# Patient Record
Sex: FEMALE | Race: WHITE | NOT HISPANIC OR LATINO | Employment: OTHER | URBAN - METROPOLITAN AREA
[De-identification: names, ages, dates, MRNs, and addresses within clinical notes are randomized per-mention and may not be internally consistent; named-entity substitution may affect disease eponyms.]

---

## 2017-01-09 ENCOUNTER — DOCUMENTATION ONLY (OUTPATIENT)
Dept: FAMILY MEDICINE | Facility: CLINIC | Age: 82
End: 2017-01-09

## 2017-01-09 NOTE — PROGRESS NOTES
Pre-Visit Chart Review  For Appointment Scheduled on 1-10-17    There are no preventive care reminders to display for this patient.

## 2017-01-10 ENCOUNTER — OFFICE VISIT (OUTPATIENT)
Dept: FAMILY MEDICINE | Facility: CLINIC | Age: 82
End: 2017-01-10
Payer: MEDICARE

## 2017-01-10 VITALS
BODY MASS INDEX: 19.67 KG/M2 | TEMPERATURE: 98 F | HEIGHT: 63 IN | DIASTOLIC BLOOD PRESSURE: 68 MMHG | WEIGHT: 111 LBS | HEART RATE: 64 BPM | SYSTOLIC BLOOD PRESSURE: 140 MMHG

## 2017-01-10 DIAGNOSIS — N18.30 CHRONIC KIDNEY DISEASE, STAGE III (MODERATE): ICD-10-CM

## 2017-01-10 DIAGNOSIS — M06.9 RHEUMATOID ARTHRITIS, INVOLVING UNSPECIFIED SITE, UNSPECIFIED RHEUMATOID FACTOR PRESENCE: Primary | ICD-10-CM

## 2017-01-10 DIAGNOSIS — R53.81 DEBILITY: ICD-10-CM

## 2017-01-10 DIAGNOSIS — I10 ESSENTIAL HYPERTENSION: ICD-10-CM

## 2017-01-10 DIAGNOSIS — R01.1 CARDIAC MURMUR: ICD-10-CM

## 2017-01-10 PROCEDURE — 99214 OFFICE O/P EST MOD 30 MIN: CPT | Mod: S$PBB,,, | Performed by: FAMILY MEDICINE

## 2017-01-10 PROCEDURE — 99999 PR PBB SHADOW E&M-EST. PATIENT-LVL IV: CPT | Mod: PBBFAC,,, | Performed by: FAMILY MEDICINE

## 2017-01-10 PROCEDURE — 99214 OFFICE O/P EST MOD 30 MIN: CPT | Mod: PBBFAC,PO | Performed by: FAMILY MEDICINE

## 2017-01-10 PROCEDURE — 99499 UNLISTED E&M SERVICE: CPT | Mod: S$PBB,,, | Performed by: FAMILY MEDICINE

## 2017-01-10 RX ORDER — LISINOPRIL 20 MG/1
20 TABLET ORAL DAILY
Qty: 90 TABLET | Refills: 3 | Status: SHIPPED | OUTPATIENT
Start: 2017-01-10 | End: 2017-11-27 | Stop reason: SDUPTHER

## 2017-01-10 NOTE — MR AVS SNAPSHOT
[facility]  [address] [address]  Phone: [phone]  Fax: [phone]                  Melody Dupont   1/10/2017 3:20 PM   Office Visit    Description:  Female : 1927   Provider:  Lady Loving MD   Department:  [place] [facility]           Reason for Visit     Establish Care           Diagnoses this Visit        Comments    Rheumatoid arthritis, involving unspecified site, unspecified rheumatoid factor presence    -  Primary     Essential hypertension         Chronic kidney disease, stage III (moderate)         Debility         Cardiac murmur                To Do List           Future Appointments        Provider Department Dept Phone    2017 1:00 PM HEIDE DWYER [facility] - Cardiology [phone]    3/6/2017 1:30 PM MD [facility] - Rheumatology [phone]    2017 4:00 PM Lady Loving MD [facility] [phone]      Goals (5 Years of Data)     None      Follow-Up and Disposition     Return in about 3 months (around 4/10/2017).       These Medications        Disp Refills Start End    lisinopril (PRINIVIL,ZESTRIL) 20 MG tablet 90 tablet 3 1/10/2017     Take 1 tablet (20 mg total) by mouth once daily. - Oral    Pharmacy: [facility]/pharmacy #[id number] - [place], [place] - [address] Ph #: [phone]         [facility]     [facility] On [facility] Nurse Care Line -  Assistance  Registered nurses in the [facility] On Call Center provide clinical advisement, health education, appointment booking, and other advisory services.  Call for this free service at [phone].             Medications           Message regarding Medications     Verify the changes and/or additions to your medication regime listed below are the same as discussed with your clinician today.  If any of these changes or additions are incorrect, please notify your healthcare provider.        CHANGE how you are taking these medications     Start  "Taking Instead of    lisinopril (PRINIVIL,ZESTRIL) 20 MG tablet lisinopril 10 MG tablet    Dosage:  Take 1 tablet (20 mg total) by mouth once daily. Dosage:  TAKE 1 TABLET BY MOUTH ONCE DAILY    Reason for Change:  Reorder       STOP taking these medications     predniSONE (DELTASONE) 10 MG tablet TAKE BY MOUTH ONCE DAILY.    nebivolol (BYSTOLIC) 5 MG Tab Take 1 tablet (5 mg total) by mouth once daily.           Verify that the below list of medications is an accurate representation of the medications you are currently taking.  If none reported, the list may be blank. If incorrect, please contact your healthcare provider. Carry this list with you in case of emergency.           Current Medications     aspirin (ECOTRIN) 81 MG EC tablet Take 162 mg by mouth once daily.    cyanocobalamin (VITAMIN B-12) 1000 MCG tablet Take 100 mcg by mouth once daily.    lisinopril (PRINIVIL,ZESTRIL) 20 MG tablet Take 1 tablet (20 mg total) by mouth once daily.    pravastatin (PRAVACHOL) 10 MG tablet Take 1 tablet (10 mg total) by mouth once daily.           Clinical Reference Information           Vital Signs - Last Recorded  Most recent update: 1/10/2017  3:19 PM by Charlotte Mcmanus MA    BP Pulse Temp Ht Wt BMI    (!) 140/68 (BP Location: Right arm, Patient Position: Sitting, BP Method: Manual) 64 97.9 °F (36.6 °C) (Oral) 5' 3" (1.6 m) 50.3 kg (111 lb) 19.66 kg/m2      Blood Pressure          Most Recent Value    BP  (!)  140/68      Allergies as of 1/10/2017     Antihistamines - Alkylamine    Plavix [Clopidogrel]    Tramadol    Triamcinolone    Penicillins      Immunizations Administered on Date of Encounter - 1/10/2017     None      Orders Placed During Today's Visit      Normal Orders This Visit    Ambulatory referral to Home Health     Ambulatory referral to Rheumatology     Future Labs/Procedures Expected by Expires    2D Echo w/ Color Flow Doppler  As directed 1/10/2018      "

## 2017-01-16 NOTE — PROGRESS NOTES
Subjective:       Patient ID: Melody Dupont is a 89 y.o. female.    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  4-5 times in last 3 years.  Taking 2 81mg asa a day    Dr. Nam bhagat for 30 years    Rheum Dr. Cisneros treating for RA.  Uses walker or a wheelchair for mobility and fall prevention.  Recently stopped prednisone    Review of Systems   Constitutional: Negative for fatigue and unexpected weight change.   Respiratory: Negative for chest tightness and shortness of breath.    Cardiovascular: Negative for chest pain, palpitations and leg swelling.   Gastrointestinal: Negative for abdominal pain.   Endocrine: Negative for polydipsia, polyphagia and polyuria.   Musculoskeletal: Positive for arthralgias, back pain, myalgias, neck pain and neck stiffness.   Neurological: Negative for dizziness, syncope, light-headedness and headaches.       Objective:      Physical Exam   Constitutional: She is oriented to person, place, and time. She appears well-developed and well-nourished.   Cardiovascular: Normal rate and regular rhythm.    Murmur heard.  Pulmonary/Chest: Effort normal and breath sounds normal.   Musculoskeletal: She exhibits no edema.   Neurological: She is alert and oriented to person, place, and time.   Skin: Skin is warm and dry.   Psychiatric: She has a normal mood and affect.   Nursing note and  vitals reviewed.      Assessment:       1. Rheumatoid arthritis, involving unspecified site, unspecified rheumatoid factor presence    2. Essential hypertension    3. Chronic kidney disease, stage III (moderate)    4. Debility    5. Cardiac murmur        Plan:       1. Rheumatoid arthritis, involving unspecified site, unspecified rheumatoid factor presence  Cont care under rheum and current meds  - Ambulatory referral to Rheumatology  - Ambulatory referral to Home Health    2. Essential hypertension  Controlled on current medications.  Continue current medications.  Goal < 150/90    3. Chronic kidney disease, stage III (moderate)  Stable and chronic.  Will continue to monitor q3-6 months and control chronic conditions as optimally as possible to preserve function.      4. Debility  HH for SN, PT, OT .  Fall precautions.  Cont walker and/or wheelchair use  - Ambulatory referral to Home Health    5. Cardiac murmur  Refer for eval and treatment  - 2D Echo w/ Color Flow Doppler; Future       [facility] goal documentation:  Patient readiness: acceptance and barriers:readiness  During the course of the visit the patient was educated and counseled about the following: Hypertension:   Dietary sodium restriction.  Regular aerobic exercise.  Goals: Hypertension: Reduce Blood Pressure  Goal/Outcomes met:Hypertension  The following self management tools provided:none  Patient Instructions (the written plan) was given to the patient/family: Yes  Time spent with patient: 40 minutes    Patient with be reevaluated in 3 months or sooner prn    Greater than 50% of this visit was spent counseling as described in above documentation:Yes

## 2017-01-17 ENCOUNTER — CLINICAL SUPPORT (OUTPATIENT)
Dept: CARDIOLOGY | Facility: CLINIC | Age: 82
End: 2017-01-17
Payer: MEDICARE

## 2017-01-17 DIAGNOSIS — R01.1 CARDIAC MURMUR: ICD-10-CM

## 2017-01-17 LAB
AORTIC VALVE REGURGITATION: ABNORMAL
AORTIC VALVE STENOSIS: ABNORMAL
DIASTOLIC DYSFUNCTION: YES
ESTIMATED PA SYSTOLIC PRESSURE: 26.04
RETIRED EF AND QEF - SEE NOTES: 60 (ref 55–65)
TRICUSPID VALVE REGURGITATION: ABNORMAL

## 2017-01-17 PROCEDURE — 93306 TTE W/DOPPLER COMPLETE: CPT | Mod: PBBFAC,PO | Performed by: INTERNAL MEDICINE

## 2017-01-23 ENCOUNTER — INITIAL CONSULT (OUTPATIENT)
Dept: RHEUMATOLOGY | Facility: CLINIC | Age: 82
End: 2017-01-23
Payer: MEDICARE

## 2017-01-23 ENCOUNTER — HOSPITAL ENCOUNTER (OUTPATIENT)
Dept: RADIOLOGY | Facility: HOSPITAL | Age: 82
Discharge: HOME | End: 2017-01-23
Attending: INTERNAL MEDICINE
Payer: MEDICARE

## 2017-01-23 VITALS
HEART RATE: 86 BPM | DIASTOLIC BLOOD PRESSURE: 65 MMHG | WEIGHT: 110 LBS | SYSTOLIC BLOOD PRESSURE: 122 MMHG | BODY MASS INDEX: 17.68 KG/M2 | HEIGHT: 66 IN

## 2017-01-23 DIAGNOSIS — M25.461 SWELLING OF RIGHT KNEE JOINT: ICD-10-CM

## 2017-01-23 DIAGNOSIS — M81.0 OSTEOPOROSIS: ICD-10-CM

## 2017-01-23 DIAGNOSIS — M05.79 RHEUMATOID ARTHRITIS INVOLVING MULTIPLE SITES WITH POSITIVE RHEUMATOID FACTOR: ICD-10-CM

## 2017-01-23 DIAGNOSIS — M05.742 RHEUMATOID ARTHRITIS INVOLVING BOTH HANDS WITH POSITIVE RHEUMATOID FACTOR: ICD-10-CM

## 2017-01-23 DIAGNOSIS — R53.83 FATIGUE, UNSPECIFIED TYPE: ICD-10-CM

## 2017-01-23 DIAGNOSIS — M94.9 DISORDER OF BONE AND CARTILAGE: ICD-10-CM

## 2017-01-23 DIAGNOSIS — M05.742 RHEUMATOID ARTHRITIS INVOLVING BOTH HANDS WITH POSITIVE RHEUMATOID FACTOR: Primary | ICD-10-CM

## 2017-01-23 DIAGNOSIS — M05.741 RHEUMATOID ARTHRITIS INVOLVING BOTH HANDS WITH POSITIVE RHEUMATOID FACTOR: ICD-10-CM

## 2017-01-23 DIAGNOSIS — M05.741 RHEUMATOID ARTHRITIS INVOLVING BOTH HANDS WITH POSITIVE RHEUMATOID FACTOR: Primary | ICD-10-CM

## 2017-01-23 DIAGNOSIS — Z11.4 SCREENING FOR HIV (HUMAN IMMUNODEFICIENCY VIRUS): ICD-10-CM

## 2017-01-23 DIAGNOSIS — E55.9 HYPOVITAMINOSIS D: ICD-10-CM

## 2017-01-23 DIAGNOSIS — M89.9 DISORDER OF BONE AND CARTILAGE: ICD-10-CM

## 2017-01-23 DIAGNOSIS — M79.10 MYALGIA: ICD-10-CM

## 2017-01-23 PROCEDURE — 71020 XR CHEST PA AND LATERAL: CPT | Mod: 26,,, | Performed by: RADIOLOGY

## 2017-01-23 PROCEDURE — 99205 OFFICE O/P NEW HI 60 MIN: CPT | Mod: 25,S$PBB,, | Performed by: INTERNAL MEDICINE

## 2017-01-23 PROCEDURE — 77077 JOINT SURVEY SINGLE VIEW: CPT | Mod: 26,,, | Performed by: RADIOLOGY

## 2017-01-23 PROCEDURE — 71020 XR CHEST PA AND LATERAL: CPT | Mod: TC

## 2017-01-23 PROCEDURE — 99999 PR PBB SHADOW E&M-EST. PATIENT-LVL III: CPT | Mod: PBBFAC,,, | Performed by: INTERNAL MEDICINE

## 2017-01-23 PROCEDURE — 99499 UNLISTED E&M SERVICE: CPT | Mod: S$PBB,,, | Performed by: INTERNAL MEDICINE

## 2017-01-23 PROCEDURE — 99213 OFFICE O/P EST LOW 20 MIN: CPT | Mod: PBBFAC,PO | Performed by: INTERNAL MEDICINE

## 2017-01-23 PROCEDURE — 20610 DRAIN/INJ JOINT/BURSA W/O US: CPT | Mod: PBBFAC,PO | Performed by: INTERNAL MEDICINE

## 2017-01-23 PROCEDURE — 77077 JOINT SURVEY SINGLE VIEW: CPT | Mod: TC

## 2017-01-23 RX ORDER — METHYLPREDNISOLONE ACETATE 40 MG/ML
20 INJECTION, SUSPENSION INTRA-ARTICULAR; INTRALESIONAL; INTRAMUSCULAR; SOFT TISSUE
Status: COMPLETED | OUTPATIENT
Start: 2017-01-23 | End: 2017-01-23

## 2017-01-23 RX ADMIN — METHYLPREDNISOLONE ACETATE 20 MG: 40 INJECTION, SUSPENSION INTRA-ARTICULAR; INTRALESIONAL; INTRAMUSCULAR; SOFT TISSUE at 03:01

## 2017-01-23 ASSESSMENT — ROUTINE ASSESSMENT OF PATIENT INDEX DATA (RAPID3)
TOTAL RAPID3 SCORE: 6.89
PATIENT GLOBAL ASSESSMENT SCORE: 6
MDHAQ FUNCTION SCORE: 2
PAIN SCORE: 8
PSYCHOLOGICAL DISTRESS SCORE: 6.6

## 2017-01-23 NOTE — PROGRESS NOTES
Administered 0.25cc DepoMedrol 80mg/cc  to left upper outer Gluteal. Pt tolerated well. No acute reaction noted to site. Pt instructed on S/S to report. Pt verbalized understanding.     Lot: [id number]  Exp: 10/2017

## 2017-01-23 NOTE — PROCEDURES
Large Joint Aspiration/Injection  Date/Time: 1/23/2017 2:12 PM  Performed by: CHANTALE KEARNS  Authorized by: CHANTALE KEARNS     Consent Done?:  Yes (Verbal)  Indications:  Pain and joint swelling  Procedure site marked: Yes    Timeout: Prior to procedure the correct patient, procedure, and site was verified      Location:  Knee  Site:  R knee  Prep: Patient was prepped and draped in usual sterile fashion    Ultrasonic Guidance for needle placement: No  Needle size:  22 G  Approach:  Medial  Aspirate amount (ml):  0  Patient tolerance:  Patient tolerated the procedure well with no immediate complications    Additional Comments: Attempted arthrocentesis of right knee-0ml of SF was obtained

## 2017-01-23 NOTE — MR AVS SNAPSHOT
[place] [facility]  [address]  [place] [address]  Phone: [phone]  Fax: [phone]                  Melody Dupont   2017 10:00 AM   Initial consult    Description:  Female : 1927   Provider:  Shelby Peraza MD   Department:  [place] - Rheumatology           Reason for Visit     Consult           Diagnoses this Visit        Comments    Rheumatoid arthritis involving both hands with positive rheumatoid factor    -  Primary     Screening for HIV (human immunodeficiency virus)         Disorder of bone and cartilage                To Do List           Future Appointments        Provider Department Dept Phone    2017 2:20 PM [facility] XR2 [facility] [phone]    2017 2:40 PM [facility] [phone]    2017 12:50 PM [facility] [phone]    2017 1:00 PM [facility] DEXA1 [facility] [phone]    2017 2:00 PM Shelby Peraza MD [place] - Rheumatology [phone]      Goals (5 Years of Data)     None      [facility] On Call     [facility] On [facility] Nurse Care Line - 247 Assistance  Registered nurses in the [facility] On Call Center provide clinical advisement, health education, appointment booking, and other advisory services.  Call for this free service at [phone].             Medications           Message regarding Medications     Verify the changes and/or additions to your medication regime listed below are the same as discussed with your clinician today.  If any of these changes or additions are incorrect, please notify your healthcare provider.        These medications were administered today        Dose Freq    methylPREDNISolone acetate injection 20 mg 20 mg Clinic/HOD 1 time    Sig: Inject 0.5 mLs (20 mg total) into the muscle one time.    Class: Normal    Route: Intramuscular           Verify that the below list of medications is an  "accurate representation of the medications you are currently taking.  If none reported, the list may be blank. If incorrect, please contact your healthcare provider. Carry this list with you in case of emergency.           Current Medications     aspirin (ECOTRIN) 81 MG EC tablet Take 162 mg by mouth once daily.    cyanocobalamin (VITAMIN B-12) 1000 MCG tablet Take 100 mcg by mouth once daily.    lisinopril (PRINIVIL,ZESTRIL) 20 MG tablet Take 1 tablet (20 mg total) by mouth once daily.    pravastatin (PRAVACHOL) 10 MG tablet Take 1 tablet (10 mg total) by mouth once daily.           Clinical Reference Information           Vital Signs - Last Recorded  Most recent update: 1/23/2017  1:05 PM by Michelle Rubio LPN    BP Pulse Ht Wt BMI    122/65 86 5' 5.5" (1.664 m) 49.9 kg (110 lb 0.2 oz) 18.03 kg/m2      Blood Pressure          Most Recent Value    BP  122/65      Allergies as of 1/23/2017     Antihistamines - Alkylamine    Plavix [Clopidogrel]    Tramadol    Triamcinolone    Penicillins      Immunizations Administered on Date of Encounter - 1/23/2017     None      Orders Placed During Today's Visit     Future Labs/Procedures Expected by Expires    MG  1/23/2017 3/24/2018    C-reactive protein  1/23/2017 3/24/2018    CK  1/23/2017 3/24/2018    Cyclic citrul peptide antibody, IgG  1/23/2017 3/24/2018    DXA Bone Density Spine And Hip_Axial Skeleton  1/23/2017 1/24/2018    HBcAB  1/23/2017 3/24/2018    Hepatitis B surface antibody  1/23/2017 3/24/2018    Hepatitis B surface antigen  1/23/2017 3/24/2018    Hepatitis C antibody  1/23/2017 3/24/2018    HIV-1 and HIV-2 antibodies  1/23/2017 3/24/2018    Quantiferon Gold TB  1/23/2017 3/24/2018    Rheumatoid factor  1/23/2017 3/24/2018    RPR  1/23/2017 3/24/2018    Sedimentation rate, manual  1/23/2017 3/24/2018    Sjogrens syndrome-A extractable nuclear antibody  1/23/2017 3/24/2018    Vitamin D  1/23/2017 3/24/2018    X-Ray Chest PA And Lateral  1/23/2017 1/24/2018    " XR Arthritis Survey  1/23/2017 1/23/2018

## 2017-01-23 NOTE — PROGRESS NOTES
"Subjective:       Patient ID: Melody Dupont is a 89 y.o. female.    Chief Complaint:Rheumatoid arthritis   HPI 88 yo female with HTN and CKD3 is seen in consultation for RA. RA was diagnosed 30 years ago by rheumatologist in [place]. Also was following with Dr Roberson for 5 years. Last saw her summer 2016. MTX was started 10 years ago- took 2 years ago. Discontinued because of fear of side effects. Has taken prednisone intermittently.   Now has severely deformed joints.   Today, pain is located in rt knee 8/10, worse in the evening, worse with activity, better with rest. Accompanied by swelling.  Received steroid injection by Dr. Lee 3 weeks ago  She denies fever, weight loss, dry eyes or mouth, photosensitivity, rash, ulcer, raynaud's phenomenon, alopecia, dysphagia, diarrhea or blood in the stools    Review of Systems   Constitutional: Positive for fatigue. Negative for fever.   HENT: Negative for ear discharge and ear pain.    Eyes: Negative for pain and redness.   Respiratory: Negative for cough and shortness of breath.    Cardiovascular: Negative for chest pain and palpitations.   Gastrointestinal: Negative for abdominal distention and abdominal pain.   Genitourinary: Negative for genital sores and hematuria.   Musculoskeletal: Positive for myalgias.   Neurological: Negative for tremors and seizures.   Psychiatric/Behavioral: Negative for agitation and hallucinations.         Objective:     Visit Vitals    /65    Pulse 86    Ht 5' 5.5" (1.664 m)    Wt 49.9 kg (110 lb 0.2 oz)    BMI 18.03 kg/m2        Physical Exam   Constitutional: She is oriented to person, place, and time and well-developed, well-nourished, and in no distress.   HENT:   Head: Normocephalic and atraumatic.   Eyes: Conjunctivae and EOM are normal. Pupils are equal, round, and reactive to light.   Neck: Neck supple. No thyromegaly present.   Cardiovascular: Normal rate and regular rhythm.    Pulmonary/Chest: Effort normal " and breath sounds normal.   Abdominal: Soft. Bowel sounds are normal. There is no hepatomegaly.       Right Side Rheumatological Exam     Examination finds the shoulder, elbow, wrist, 1st PIP, 1st MCP, 2nd PIP, 2nd MCP, 3rd PIP, 3rd MCP, 4th PIP, 4th MCP, 5th PIP and 5th MCP normal.    The patient is tender to palpation of the knee    She has swelling of the knee    Left Side Rheumatological Exam     Examination finds the shoulder, elbow, wrist, knee, 1st PIP, 1st MCP, 2nd PIP, 2nd MCP, 3rd PIP, 3rd MCP, 4th PIP, 4th MCP, 5th PIP and 5th MCP normal.      Neurological: She is alert and oriented to person, place, and time. She has normal sensation.   Skin: Skin is warm and dry.     Psychiatric: Memory and affect normal.   Musculoskeletal: She exhibits no edema.   Patient unable to climb on the examination table  + Scoliosis  Restriction of left shoulder on abduction and external rotation  Right shoulder with intact range of motion  Right elbow with crepitus and enlargement  Left elbow intact  Bilateral wrists with crepitus and limited extension and flexion  Bilateral hands with severe ulnar deviation and MCP subluxation  Bilateral hips with external rotation 15° internal rotation 0°  Right knee with swelling and tenderness  Left knee enlarged with crepitus  Bilateral ankles without any swelling  Bilateral feet with severe mtp subluxation, hallux valgus and hammer toes           Lab Results   Component Value Date    WBC 9.84 10/03/2016    HGB 13.2 10/03/2016    HCT 42.1 10/03/2016    MCV 93 10/03/2016     10/03/2016     CMP  Sodium   Date Value Ref Range Status   10/03/2016 142 136 - 145 mmol/L Final     Potassium   Date Value Ref Range Status   10/03/2016 4.6 3.5 - 5.1 mmol/L Final     Chloride   Date Value Ref Range Status   10/03/2016 108 95 - 110 mmol/L Final     CO2   Date Value Ref Range Status   10/03/2016 22 (L) 23 - 29 mmol/L Final     Glucose   Date Value Ref Range Status   10/03/2016 121 (H) 70 - 110  mg/dL Final     BUN, Bld   Date Value Ref Range Status   10/03/2016 20 8 - 23 mg/dL Final     Creatinine   Date Value Ref Range Status   10/03/2016 1.1 0.5 - 1.4 mg/dL Final     Calcium   Date Value Ref Range Status   10/03/2016 9.5 8.7 - 10.5 mg/dL Final     Total Protein   Date Value Ref Range Status   10/03/2016 6.9 6.0 - 8.4 g/dL Final     Albumin   Date Value Ref Range Status   10/03/2016 3.6 3.5 - 5.2 g/dL Final     Total Bilirubin   Date Value Ref Range Status   10/03/2016 0.3 0.1 - 1.0 mg/dL Final     Comment:     For infants and newborns, interpretation of results should be based  on gestational age, weight and in agreement with clinical  observations.  Premature Infant recommended reference ranges:  Up to 24 hours.............<8.0 mg/dL  Up to 48 hours............<12.0 mg/dL  3-5 days..................<15.0 mg/dL  6-29 days.................<15.0 mg/dL       Alkaline Phosphatase   Date Value Ref Range Status   10/03/2016 75 55 - 135 U/L Final     AST   Date Value Ref Range Status   10/03/2016 18 10 - 40 U/L Final     ALT   Date Value Ref Range Status   10/03/2016 10 10 - 44 U/L Final     Anion Gap   Date Value Ref Range Status   10/03/2016 12 8 - 16 mmol/L Final     eGFR if    Date Value Ref Range Status   10/03/2016 51.4 (A) >60 mL/min/1.73 m^2 Final     eGFR if non    Date Value Ref Range Status   10/03/2016 44.6 (A) >60 mL/min/1.73 m^2 Final     Comment:     Calculation used to obtain the estimated glomerular filtration  rate (eGFR) is the CKD-EPI equation. Since race is unknown   in our information system, the eGFR values for   -American and Non--American patients are given   for each creatinine result.       Lab Results   Component Value Date    SEDRATE 11 07/24/2008     No results found for: CRP    Assessment:   Seropositive erosive rheumatoid arthritis with severe deformities-not been on medications for the last 8 years -obtain records from Dr. Roberson    Right knee swelling-attempted arthrocentesis-no synovitis fluid was obtained.  No immediate complications  Osteoporosis with fractures  Myalgias-rule out myositis  Fatigue-rule out vitamin D deficiency /disorders of bone and cartilage  CK D3  Plan:   Attempted arthrocentesis-no synovitis fluid was obtained.  No immediate complicationsDepo-Medrol 20 mg IM ×1 (triamcinolone is listed as ALLERGY but she gives history of taking steroid injections in the past without any side effects)  Check MG, SSA, RF, CCP, ESR, CRP, Arthritis survey  Check CPK, aldolase, 25 hydroxy Vit D   Check DEXA scan  Plan is to start on methotrexate for rheumatoid arthritis  Will renally dose the medications  Discussed side effects of methotrexate in length  Check pre-DMARD's including HIV screening and chest x-ray  Plan is to start on Prolia for osteoporosis  Discussed side effects of Prolia  Counseled for fall prevention  Return to clinic in 1 month    Low vitamin D. Prescribed vitamin D 50,000 a week for 12 weeks.

## 2017-01-23 NOTE — LETTER
January 24, 2017      Lady Loving MD  [address]  [place] [address]           [place] [facility]  [address]  [place] [address]  Phone: [phone]  Fax: [phone]          Patient: Melody Dupont   MR Number: [id number]   YOB: 1927   Date of Visit: 1/23/2017       Dear Dr. Lady Loving:    Thank you for referring Melody Dupont to me for evaluation. Attached you will find relevant portions of my assessment and plan of care.    If you have questions, please do not hesitate to call me. I look forward to following Melody Dupont along with you.    Sincerely,    Shelby Peraza MD    Enclosure  CC:  No Recipients    If you would like to receive this communication electronically, please contact [email or web address] or [phone] to request more information on [facility] access.    For providers and/or their staff who would like to refer a patient to [facility], please contact us through our one-stop-shop provider referral line, [facility], at [phone].    If you feel you have received this communication in error or would no longer like to receive these types of communications, please e-mail [email or web address]

## 2017-01-24 RX ORDER — ERGOCALCIFEROL 1.25 MG/1
50000 CAPSULE ORAL
Qty: 12 CAPSULE | Refills: 0 | Status: SHIPPED | OUTPATIENT
Start: 2017-01-24 | End: 2017-04-27 | Stop reason: SDUPTHER

## 2017-01-30 ENCOUNTER — TELEPHONE (OUTPATIENT)
Dept: RHEUMATOLOGY | Facility: CLINIC | Age: 82
End: 2017-01-30

## 2017-01-30 NOTE — TELEPHONE ENCOUNTER
----- Message from Rosita Russell sent at 1/30/2017  8:37 AM CST -----  Contact: patient  Patient calling in regards to requesting a script for pain medication for her right knee. Please advise.  Call back .  Thanks!  [id number] - [place] - [address]  [address] [address]  Phone: [phone] Fax: [phone]

## 2017-01-31 ENCOUNTER — TELEPHONE (OUTPATIENT)
Dept: FAMILY MEDICINE | Facility: CLINIC | Age: 82
End: 2017-01-31

## 2017-01-31 DIAGNOSIS — Z74.09 MOBILITY IMPAIRED: Primary | ICD-10-CM

## 2017-01-31 NOTE — TELEPHONE ENCOUNTER
Spoke to Christi with Home Health. Patients wheelchair is very old and broken. Needs order faxed to [phone].

## 2017-01-31 NOTE — TELEPHONE ENCOUNTER
----- Message from Zeb Dias sent at 1/31/2017  9:46 AM CST -----  Contact: Christi/[facility]  Christi called in regarding the attached patient.  Christi stated that patient has a wheelchair that the brakes will not lock and is very old and unsafe.  Christi wanted to see if we could get a new one for patient.    Christi's call back number is [phone]

## 2017-02-01 NOTE — TELEPHONE ENCOUNTER
Patient notified that she has to complete her TB gold test prior to starting her methotrexate, but she can take Tylenol for her pain. Pt verbalized understanding.

## 2017-02-17 ENCOUNTER — TELEPHONE (OUTPATIENT)
Dept: RHEUMATOLOGY | Facility: CLINIC | Age: 82
End: 2017-02-17

## 2017-02-17 NOTE — TELEPHONE ENCOUNTER
----- Message from Hanane Celisza sent at 2/17/2017  8:02 AM CST -----  Contact: self [phone]  She is having frequent muscle spasms, she would like to know what she can take to help that?  And she is having pain in her femoral bone, she would like to have an xray of that on 2/27.  Please call her.  Thank you!

## 2017-02-17 NOTE — TELEPHONE ENCOUNTER
Patient notified that cholesterol medication can cause muscle spasms, she should discuss with prescriber if the medications can be changed. Take tylenol for pain- upto 2g a day. Needs further evaluation for pain-our office is closed today. If the pain is bad- she needs to go  to the ER. Pt verbalized understanding.

## 2017-02-24 ENCOUNTER — TELEPHONE (OUTPATIENT)
Dept: FAMILY MEDICINE | Facility: CLINIC | Age: 82
End: 2017-02-24

## 2017-02-24 DIAGNOSIS — I51.89 DIASTOLIC DYSFUNCTION: ICD-10-CM

## 2017-02-24 DIAGNOSIS — I35.0 AORTIC VALVE STENOSIS, UNSPECIFIED ETIOLOGY: Primary | ICD-10-CM

## 2017-02-24 NOTE — TELEPHONE ENCOUNTER
----- Message from Lady Loving MD sent at 2/24/2017  9:58 AM CST -----  Notify patient:  Echo showed aortic stenosis and some mild diastolic dysfunction.  I would like to send her for card eval.

## 2017-02-24 NOTE — TELEPHONE ENCOUNTER
Advised patient of echo results and scheduled her for an appointment with Dr. Cristina in April. I also put her on the wait list. Patient verbalized understanding.

## 2017-02-27 ENCOUNTER — OFFICE VISIT (OUTPATIENT)
Dept: RHEUMATOLOGY | Facility: CLINIC | Age: 82
End: 2017-02-27
Payer: MEDICARE

## 2017-02-27 ENCOUNTER — HOSPITAL ENCOUNTER (OUTPATIENT)
Dept: RADIOLOGY | Facility: HOSPITAL | Age: 82
Discharge: HOME | End: 2017-02-27
Attending: INTERNAL MEDICINE
Payer: MEDICARE

## 2017-02-27 VITALS
WEIGHT: 110.44 LBS | SYSTOLIC BLOOD PRESSURE: 135 MMHG | HEART RATE: 82 BPM | BODY MASS INDEX: 17.75 KG/M2 | DIASTOLIC BLOOD PRESSURE: 61 MMHG | HEIGHT: 66 IN

## 2017-02-27 DIAGNOSIS — J84.10 LUNG FIBROSIS: Primary | ICD-10-CM

## 2017-02-27 DIAGNOSIS — M05.742 RHEUMATOID ARTHRITIS INVOLVING BOTH HANDS WITH POSITIVE RHEUMATOID FACTOR: ICD-10-CM

## 2017-02-27 DIAGNOSIS — E55.9 HYPOVITAMINOSIS D: ICD-10-CM

## 2017-02-27 DIAGNOSIS — M06.9 RHEUMATOID ARTHRITIS, INVOLVING UNSPECIFIED SITE, UNSPECIFIED RHEUMATOID FACTOR PRESENCE: Primary | ICD-10-CM

## 2017-02-27 DIAGNOSIS — M94.9 DISORDER OF BONE AND CARTILAGE: ICD-10-CM

## 2017-02-27 DIAGNOSIS — M89.9 DISORDER OF BONE AND CARTILAGE: ICD-10-CM

## 2017-02-27 DIAGNOSIS — M05.741 RHEUMATOID ARTHRITIS INVOLVING BOTH HANDS WITH POSITIVE RHEUMATOID FACTOR: ICD-10-CM

## 2017-02-27 PROCEDURE — 99499 UNLISTED E&M SERVICE: CPT | Mod: S$PBB,,, | Performed by: INTERNAL MEDICINE

## 2017-02-27 PROCEDURE — 99214 OFFICE O/P EST MOD 30 MIN: CPT | Mod: S$PBB,,, | Performed by: INTERNAL MEDICINE

## 2017-02-27 PROCEDURE — 99999 PR PBB SHADOW E&M-EST. PATIENT-LVL III: CPT | Mod: PBBFAC,,, | Performed by: INTERNAL MEDICINE

## 2017-02-27 PROCEDURE — 77080 DXA BONE DENSITY AXIAL: CPT | Mod: 26,,, | Performed by: RADIOLOGY

## 2017-02-27 PROCEDURE — 99213 OFFICE O/P EST LOW 20 MIN: CPT | Mod: PBBFAC,PO | Performed by: INTERNAL MEDICINE

## 2017-02-27 PROCEDURE — 77080 DXA BONE DENSITY AXIAL: CPT | Mod: TC

## 2017-02-27 ASSESSMENT — ROUTINE ASSESSMENT OF PATIENT INDEX DATA (RAPID3)
PATIENT GLOBAL ASSESSMENT SCORE: 5
WHEN YOU AWAKENED IN THE MORNING OVER THE LAST WEEK, PLEASE INDICATE THE AMOUNT OF TIME IT TAKES UNTIL YOU ARE AS LIMBER AS YOU WILL BE FOR THE DAY: 1 HOUR
PAIN SCORE: 6
TOTAL RAPID3 SCORE: 5
PSYCHOLOGICAL DISTRESS SCORE: 2.2
AM STIFFNESS SCORE: 1, YES
FATIGUE SCORE: 5
MDHAQ FUNCTION SCORE: 1.2

## 2017-02-27 NOTE — PROGRESS NOTES
"Subjective:       Patient ID: Melody Dupont is a 89 y.o. female.    Chief Complaint:Rheumatoid arthritis   HPI 88 yo female with HTN and CKD3 is here for follow-up for seropositive RA. RA was diagnosed 30 years ago by rheumatologist in [place]. Also was following with Dr Roberson for 5 years. Last saw her summer 2016. MTX was started 10 years ago- took 2 years ago. Discontinued because of fear of side effects. Has taken prednisone intermittently.   Now has severely deformed joints.   Today, she  reports improvement in right knee swelling still has -pain  6/10, worse in the evening, worse with activity, better with rest.   She denies fever, weight loss, dry eyes or mouth, photosensitivity, rash, ulcer, raynaud's phenomenon, alopecia, dysphagia, diarrhea or blood in the stools    Review of Systems   Constitutional: Negative for fatigue and fever.   HENT: Negative for ear discharge and ear pain.    Eyes: Negative for pain and redness.   Respiratory: Negative for cough and shortness of breath.    Cardiovascular: Negative for chest pain and palpitations.   Gastrointestinal: Negative for abdominal distention and abdominal pain.   Musculoskeletal: Positive for myalgias.   Psychiatric/Behavioral: Negative for agitation and hallucinations.         Objective:     /61  Pulse 82  Ht 5' 5.5" (1.664 m)  Wt 50.1 kg (110 lb 7.2 oz)  BMI 18.1 kg/m2     Physical Exam   Constitutional: She is oriented to person, place, and time and well-developed, well-nourished, and in no distress.   HENT:   Head: Normocephalic and atraumatic.   Eyes: Conjunctivae and EOM are normal. Pupils are equal, round, and reactive to light.   Neck: Neck supple. No thyromegaly present.   Cardiovascular: Normal rate and regular rhythm.    Pulmonary/Chest: Effort normal and breath sounds normal.   Abdominal: Soft. Bowel sounds are normal.       Right Side Rheumatological Exam     Examination finds the shoulder, elbow, wrist, 1st PIP, 1st MCP, " 2nd PIP, 2nd MCP, 3rd PIP, 3rd MCP, 4th PIP, 4th MCP, 5th PIP and 5th MCP normal.    The patient is tender to palpation of the knee    She has swelling of the knee    Left Side Rheumatological Exam     Examination finds the shoulder, elbow, wrist, knee, 1st PIP, 1st MCP, 2nd PIP, 2nd MCP, 3rd PIP, 3rd MCP, 4th PIP, 4th MCP, 5th PIP and 5th MCP normal.      Neurological: She is alert and oriented to person, place, and time.   Skin: Skin is warm and dry.     Psychiatric: Memory and affect normal.   Musculoskeletal: She exhibits no edema.           Lab Results   Component Value Date    WBC 9.84 10/03/2016    HGB 13.2 10/03/2016    HCT 42.1 10/03/2016    MCV 93 10/03/2016     10/03/2016     CMP  Sodium   Date Value Ref Range Status   10/03/2016 142 136 - 145 mmol/L Final     Potassium   Date Value Ref Range Status   10/03/2016 4.6 3.5 - 5.1 mmol/L Final     Chloride   Date Value Ref Range Status   10/03/2016 108 95 - 110 mmol/L Final     CO2   Date Value Ref Range Status   10/03/2016 22 (L) 23 - 29 mmol/L Final     Glucose   Date Value Ref Range Status   10/03/2016 121 (H) 70 - 110 mg/dL Final     BUN, Bld   Date Value Ref Range Status   10/03/2016 20 8 - 23 mg/dL Final     Creatinine   Date Value Ref Range Status   10/03/2016 1.1 0.5 - 1.4 mg/dL Final     Calcium   Date Value Ref Range Status   10/03/2016 9.5 8.7 - 10.5 mg/dL Final     Total Protein   Date Value Ref Range Status   10/03/2016 6.9 6.0 - 8.4 g/dL Final     Albumin   Date Value Ref Range Status   10/03/2016 3.6 3.5 - 5.2 g/dL Final     Total Bilirubin   Date Value Ref Range Status   10/03/2016 0.3 0.1 - 1.0 mg/dL Final     Comment:     For infants and newborns, interpretation of results should be based  on gestational age, weight and in agreement with clinical  observations.  Premature Infant recommended reference ranges:  Up to 24 hours.............<8.0 mg/dL  Up to 48 hours............<12.0 mg/dL  3-5 days..................<15.0 mg/dL  6-29  days.................<15.0 mg/dL       Alkaline Phosphatase   Date Value Ref Range Status   10/03/2016 75 55 - 135 U/L Final     AST   Date Value Ref Range Status   10/03/2016 18 10 - 40 U/L Final     ALT   Date Value Ref Range Status   10/03/2016 10 10 - 44 U/L Final     Anion Gap   Date Value Ref Range Status   10/03/2016 12 8 - 16 mmol/L Final     eGFR if    Date Value Ref Range Status   10/03/2016 51.4 (A) >60 mL/min/1.73 m^2 Final     eGFR if non    Date Value Ref Range Status   10/03/2016 44.6 (A) >60 mL/min/1.73 m^2 Final     Comment:     Calculation used to obtain the estimated glomerular filtration  rate (eGFR) is the CKD-EPI equation. Since race is unknown   in our information system, the eGFR values for   -American and Non--American patients are given   for each creatinine result.       Lab Results   Component Value Date    SEDRATE 31 (H) 01/23/2017     Lab Results   Component Value Date    CRP 7.9 01/23/2017     Radiology: I personally reviewed imaging  X ray chest with Left apical pleural-parenchymal changes   Results for MEGAN RAJPUT (MRN [id number]) as of 2/27/2017 14:39   Ref. Range 1/23/2017 14:42   MG HEP-2 Titer Unknown Positive 1:160 Ce...   Anti-SSA Antibody Latest Ref Range: 0.00 - 19.99 EU 0.00   Anti-SSA Interpretation Latest Ref Range: Negative  Negative   Anti-SSB Antibody Latest Ref Range: 0.00 - 19.99 EU    Anti-SSB Interpretation Latest Ref Range: Negative     ds DNA Ab Latest Ref Range: Negative 1:10     Anti Sm Antibody Latest Ref Range: 0.00 - 19.99 EU    Anti-Sm Interpretation Latest Ref Range: Negative     Anti Sm/RNP Antibody Latest Ref Range: 0.00 - 19.99 EU    Anti-Sm/RNP Interpretation Latest Ref Range: Negative     CCP Antibodies Latest Ref Range: <5.0 U/mL 312.5 (H)   Rheumatoid Factor Latest Ref Range: 0.0 - 15.0 IU/mL 42.0 (H)       Assessment:   Seropositive erosive rheumatoid arthritis with severe deformities-not been on  medications from [id number]  Fibrosis of lungs- check CT CHEST, pulmonary referral  Osteoporosis with fractures  Hypovitamin D deficiency  CK D3  Plan:   Cont vit d 50,000u a week  for 12 weeks  Obtain CT chest-we will consider leflunomide if evidence of lung fibrosis  Pulmonary referral  Await DEXA scan results  Plan is to start on Prolia for osteoporosis  Counseled for fall prevention  Return to clinic in 1 month

## 2017-03-02 ENCOUNTER — TELEPHONE (OUTPATIENT)
Dept: RHEUMATOLOGY | Facility: CLINIC | Age: 82
End: 2017-03-02

## 2017-03-02 ENCOUNTER — HOSPITAL ENCOUNTER (OUTPATIENT)
Dept: RADIOLOGY | Facility: HOSPITAL | Age: 82
Discharge: HOME | End: 2017-03-02
Attending: INTERNAL MEDICINE
Payer: MEDICARE

## 2017-03-02 DIAGNOSIS — J84.10 LUNG FIBROSIS: ICD-10-CM

## 2017-03-02 DIAGNOSIS — M05.741 RHEUMATOID ARTHRITIS INVOLVING BOTH HANDS WITH POSITIVE RHEUMATOID FACTOR: Primary | ICD-10-CM

## 2017-03-02 DIAGNOSIS — M05.742 RHEUMATOID ARTHRITIS INVOLVING BOTH HANDS WITH POSITIVE RHEUMATOID FACTOR: Primary | ICD-10-CM

## 2017-03-02 PROCEDURE — 71250 CT THORAX DX C-: CPT | Mod: 26,,, | Performed by: RADIOLOGY

## 2017-03-02 PROCEDURE — 71250 CT THORAX DX C-: CPT | Mod: TC

## 2017-03-02 RX ORDER — LEFLUNOMIDE 10 MG/1
10 TABLET ORAL DAILY
Qty: 30 TABLET | Refills: 1 | Status: SHIPPED | OUTPATIENT
Start: 2017-03-02 | End: 2017-03-27 | Stop reason: SDUPTHER

## 2017-03-02 NOTE — TELEPHONE ENCOUNTER
Spoke with pt, lab and Ct results reviewed.  Instructed to start Leflunomide 10mg per day with verbal understanding.

## 2017-03-02 NOTE — TELEPHONE ENCOUNTER
DARVIN,  Please tell patient that the labs are stable.  CT chest shows stable findings. Started on leflunomide 10 mg a day.   Thanks SS

## 2017-03-09 ENCOUNTER — TELEPHONE (OUTPATIENT)
Dept: FAMILY MEDICINE | Facility: CLINIC | Age: 82
End: 2017-03-09

## 2017-03-09 NOTE — TELEPHONE ENCOUNTER
Called and spoke to Katerina with home health. Advised that patient needs to be seen before any medications will be given for restless leg if that what is going on with her.

## 2017-03-09 NOTE — TELEPHONE ENCOUNTER
----- Message from Eden Bray sent at 3/8/2017  3:38 PM CST -----  Contact: [facility]  Katerina   Restless leg SX  Requesting medication   .  [facility]/pharmacy #[id number] - [place] - [address]  [address] [address]  Phone: [phone] Fax: [phone]     Call back

## 2017-03-13 NOTE — TELEPHONE ENCOUNTER
Patient taking OTC homeopathic medication called [employer]'s Restful Legs and she states its working great. She has not had to get up and exercise in the middle of the night.

## 2017-03-13 NOTE — TELEPHONE ENCOUNTER
----- Message from Manjula Mcgrath sent at 3/10/2017  9:01 AM CST -----  Contact: self  Patient [phone] is calling to say she has been having severe leg spasms at night when she is trying to sleep and is asking if a medication could be called into her pharmacy to help with these so that she can sleep at night/please advise/     [facility]/pharmacy #[id number] - [place]  [address]  [address] [address]  Phone: [phone] Fax: [phone]

## 2017-03-27 ENCOUNTER — LAB VISIT (OUTPATIENT)
Dept: LAB | Facility: HOSPITAL | Age: 82
End: 2017-03-27
Attending: INTERNAL MEDICINE
Payer: MEDICARE

## 2017-03-27 ENCOUNTER — OFFICE VISIT (OUTPATIENT)
Dept: RHEUMATOLOGY | Facility: CLINIC | Age: 82
End: 2017-03-27
Payer: MEDICARE

## 2017-03-27 VITALS — BODY MASS INDEX: 17.58 KG/M2 | HEIGHT: 66 IN | WEIGHT: 109.38 LBS

## 2017-03-27 DIAGNOSIS — M05.79 RHEUMATOID ARTHRITIS INVOLVING MULTIPLE SITES WITH POSITIVE RHEUMATOID FACTOR: Primary | ICD-10-CM

## 2017-03-27 DIAGNOSIS — Z51.81 ENCOUNTER FOR MONITORING LEFLUNOMIDE THERAPY: ICD-10-CM

## 2017-03-27 DIAGNOSIS — E55.9 HYPOVITAMINOSIS D: ICD-10-CM

## 2017-03-27 DIAGNOSIS — M81.0 OSTEOPOROSIS: ICD-10-CM

## 2017-03-27 DIAGNOSIS — M05.79 RHEUMATOID ARTHRITIS INVOLVING MULTIPLE SITES WITH POSITIVE RHEUMATOID FACTOR: ICD-10-CM

## 2017-03-27 DIAGNOSIS — Z79.899 ENCOUNTER FOR MONITORING LEFLUNOMIDE THERAPY: ICD-10-CM

## 2017-03-27 LAB
ALBUMIN SERPL BCP-MCNC: 3.6 G/DL
ALBUMIN SERPL BCP-MCNC: 3.6 G/DL
ALP SERPL-CCNC: 73 U/L
ALP SERPL-CCNC: 73 U/L
ALT SERPL W/O P-5'-P-CCNC: 16 U/L
ALT SERPL W/O P-5'-P-CCNC: 16 U/L
ANION GAP SERPL CALC-SCNC: 10 MMOL/L
ANION GAP SERPL CALC-SCNC: 10 MMOL/L
AST SERPL-CCNC: 20 U/L
AST SERPL-CCNC: 20 U/L
BASOPHILS # BLD AUTO: 0 K/UL
BASOPHILS NFR BLD: 0.2 %
BILIRUB SERPL-MCNC: 0.3 MG/DL
BILIRUB SERPL-MCNC: 0.3 MG/DL
BUN SERPL-MCNC: 29 MG/DL
BUN SERPL-MCNC: 29 MG/DL
CALCIUM SERPL-MCNC: 9.5 MG/DL
CALCIUM SERPL-MCNC: 9.5 MG/DL
CHLORIDE SERPL-SCNC: 103 MMOL/L
CHLORIDE SERPL-SCNC: 103 MMOL/L
CO2 SERPL-SCNC: 25 MMOL/L
CO2 SERPL-SCNC: 25 MMOL/L
CREAT SERPL-MCNC: 1.2 MG/DL
CREAT SERPL-MCNC: 1.2 MG/DL
CRP SERPL-MCNC: 9.9 MG/L
DIFFERENTIAL METHOD: ABNORMAL
EOSINOPHIL # BLD AUTO: 0.3 K/UL
EOSINOPHIL NFR BLD: 4.2 %
ERYTHROCYTE [DISTWIDTH] IN BLOOD BY AUTOMATED COUNT: 13.4 %
ERYTHROCYTE [SEDIMENTATION RATE] IN BLOOD BY WESTERGREN METHOD: 30 MM/HR
EST. GFR  (AFRICAN AMERICAN): 46 ML/MIN/1.73 M^2
EST. GFR  (AFRICAN AMERICAN): 46 ML/MIN/1.73 M^2
EST. GFR  (NON AFRICAN AMERICAN): 40 ML/MIN/1.73 M^2
EST. GFR  (NON AFRICAN AMERICAN): 40 ML/MIN/1.73 M^2
GLUCOSE SERPL-MCNC: 96 MG/DL
GLUCOSE SERPL-MCNC: 96 MG/DL
HCT VFR BLD AUTO: 35.1 %
HGB BLD-MCNC: 11.1 G/DL
LYMPHOCYTES # BLD AUTO: 1.2 K/UL
LYMPHOCYTES NFR BLD: 15.4 %
MCH RBC QN AUTO: 27.5 PG
MCHC RBC AUTO-ENTMCNC: 31.7 %
MCV RBC AUTO: 87 FL
MONOCYTES # BLD AUTO: 0.8 K/UL
MONOCYTES NFR BLD: 10.4 %
NEUTROPHILS # BLD AUTO: 5.4 K/UL
NEUTROPHILS NFR BLD: 69.8 %
PLATELET # BLD AUTO: 132 K/UL
PMV BLD AUTO: 12.5 FL
POTASSIUM SERPL-SCNC: 4.9 MMOL/L
POTASSIUM SERPL-SCNC: 4.9 MMOL/L
PROT SERPL-MCNC: 7.4 G/DL
PROT SERPL-MCNC: 7.4 G/DL
RBC # BLD AUTO: 4.05 M/UL
SODIUM SERPL-SCNC: 138 MMOL/L
SODIUM SERPL-SCNC: 138 MMOL/L
WBC # BLD AUTO: 7.8 K/UL

## 2017-03-27 PROCEDURE — 85025 COMPLETE CBC W/AUTO DIFF WBC: CPT

## 2017-03-27 PROCEDURE — 99213 OFFICE O/P EST LOW 20 MIN: CPT | Mod: PBBFAC,PO | Performed by: INTERNAL MEDICINE

## 2017-03-27 PROCEDURE — 99214 OFFICE O/P EST MOD 30 MIN: CPT | Mod: S$PBB,,, | Performed by: INTERNAL MEDICINE

## 2017-03-27 PROCEDURE — 99999 PR PBB SHADOW E&M-EST. PATIENT-LVL III: CPT | Mod: PBBFAC,,, | Performed by: INTERNAL MEDICINE

## 2017-03-27 PROCEDURE — 36415 COLL VENOUS BLD VENIPUNCTURE: CPT

## 2017-03-27 PROCEDURE — 85651 RBC SED RATE NONAUTOMATED: CPT

## 2017-03-27 PROCEDURE — 86140 C-REACTIVE PROTEIN: CPT

## 2017-03-27 PROCEDURE — 80053 COMPREHEN METABOLIC PANEL: CPT

## 2017-03-27 PROCEDURE — 99499 UNLISTED E&M SERVICE: CPT | Mod: S$PBB,,, | Performed by: INTERNAL MEDICINE

## 2017-03-27 RX ORDER — LEFLUNOMIDE 20 MG/1
20 TABLET ORAL DAILY
Qty: 30 TABLET | Refills: 2 | Status: ON HOLD | OUTPATIENT
Start: 2017-03-27 | End: 2017-06-27 | Stop reason: CLARIF

## 2017-03-27 NOTE — PROGRESS NOTES
"Subjective:       Patient ID: Melody Dupont is a 89 y.o. female.    Chief Complaint:Rheumatoid arthritis   HPI 89 yo female with HTN and CKD3 is here for follow-up for seropositive RA. RA was diagnosed 30 years ago by rheumatologist in [place]. Also was established  with Dr Roberson for 5 years. Last saw her summer 2016. MTX was started in 2995- took 2 years ago. Discontinued because of fear of side effects. Has taken prednisone intermittently. Now has severely deformed joints.   Currently on leflunomide 10 mg a day.  Tolerating medication without any side effects Reports improvement in right knee swelling and pain since starting leflunomide but still has mild pain worse in the evening, worse with activity, better with rest.   She continues to have cramps in her legs for which she tried to discontinue vitamin D as well as statin but no changes-she is following up with Dr. Alvarez ?EMG  She denies fever, weight loss, dry eyes or mouth, photosensitivity, rash, ulcer, raynaud's phenomenon, alopecia, dysphagia, diarrhea or blood in the stools    Review of Systems   Constitutional: Negative for fatigue and fever.   HENT: Negative for ear discharge and ear pain.    Eyes: Negative for pain and redness.   Respiratory: Negative for cough and shortness of breath.    Cardiovascular: Negative for chest pain and palpitations.   Gastrointestinal: Negative for abdominal distention and abdominal pain.   Musculoskeletal: Positive for joint swelling and myalgias.   Psychiatric/Behavioral: Negative for agitation and hallucinations.         Objective:     Ht 5' 5.5" (1.664 m)  Wt 49.6 kg (109 lb 5.6 oz)  BMI 17.92 kg/m2     Physical Exam   Constitutional: She is oriented to person, place, and time and well-developed, well-nourished, and in no distress.   HENT:   Head: Normocephalic and atraumatic.   Eyes: Conjunctivae and EOM are normal. Pupils are equal, round, and reactive to light.   Neck: Neck supple. No thyromegaly " present.   Cardiovascular: Normal rate and regular rhythm.    Pulmonary/Chest: Effort normal and breath sounds normal.   Abdominal: Soft. Bowel sounds are normal.       Right Side Rheumatological Exam     Examination finds the shoulder, elbow, wrist, 1st PIP, 1st MCP, 2nd PIP, 2nd MCP, 3rd PIP, 3rd MCP, 4th PIP, 4th MCP, 5th PIP and 5th MCP normal.    The patient is tender to palpation of the knee    She has swelling of the knee    Left Side Rheumatological Exam     Examination finds the shoulder, elbow, wrist, knee, 1st PIP, 1st MCP, 2nd PIP, 2nd MCP, 3rd PIP, 3rd MCP, 4th PIP, 4th MCP, 5th PIP and 5th MCP normal.      Neurological: She is alert and oriented to person, place, and time.   Skin: Skin is warm and dry.     Psychiatric: Memory and affect normal.   Musculoskeletal: She exhibits no edema.           Lab Results   Component Value Date    WBC 7.80 03/27/2017    HGB 11.1 (L) 03/27/2017    HCT 35.1 (L) 03/27/2017    MCV 87 03/27/2017     (L) 03/27/2017     CMP  Sodium   Date Value Ref Range Status   03/27/2017 138 136 - 145 mmol/L Final   03/27/2017 138 136 - 145 mmol/L Final     Potassium   Date Value Ref Range Status   03/27/2017 4.9 3.5 - 5.1 mmol/L Final   03/27/2017 4.9 3.5 - 5.1 mmol/L Final     Chloride   Date Value Ref Range Status   03/27/2017 103 95 - 110 mmol/L Final   03/27/2017 103 95 - 110 mmol/L Final     CO2   Date Value Ref Range Status   03/27/2017 25 23 - 29 mmol/L Final   03/27/2017 25 23 - 29 mmol/L Final     Glucose   Date Value Ref Range Status   03/27/2017 96 70 - 110 mg/dL Final   03/27/2017 96 70 - 110 mg/dL Final     BUN, Bld   Date Value Ref Range Status   03/27/2017 29 (H) 8 - 23 mg/dL Final   03/27/2017 29 (H) 8 - 23 mg/dL Final     Creatinine   Date Value Ref Range Status   03/27/2017 1.2 0.5 - 1.4 mg/dL Final   03/27/2017 1.2 0.5 - 1.4 mg/dL Final     Calcium   Date Value Ref Range Status   03/27/2017 9.5 8.7 - 10.5 mg/dL Final   03/27/2017 9.5 8.7 - 10.5 mg/dL Final      Total Protein   Date Value Ref Range Status   03/27/2017 7.4 6.0 - 8.4 g/dL Final   03/27/2017 7.4 6.0 - 8.4 g/dL Final     Albumin   Date Value Ref Range Status   03/27/2017 3.6 3.5 - 5.2 g/dL Final   03/27/2017 3.6 3.5 - 5.2 g/dL Final     Total Bilirubin   Date Value Ref Range Status   03/27/2017 0.3 0.1 - 1.0 mg/dL Final     Comment:     For infants and newborns, interpretation of results should be based  on gestational age, weight and in agreement with clinical  observations.  Premature Infant recommended reference ranges:  Up to 24 hours.............<8.0 mg/dL  Up to 48 hours............<12.0 mg/dL  3-5 days..................<15.0 mg/dL  6-29 days.................<15.0 mg/dL     03/27/2017 0.3 0.1 - 1.0 mg/dL Final     Comment:     For infants and newborns, interpretation of results should be based  on gestational age, weight and in agreement with clinical  observations.  Premature Infant recommended reference ranges:  Up to 24 hours.............<8.0 mg/dL  Up to 48 hours............<12.0 mg/dL  3-5 days..................<15.0 mg/dL  6-29 days.................<15.0 mg/dL       Alkaline Phosphatase   Date Value Ref Range Status   03/27/2017 73 55 - 135 U/L Final   03/27/2017 73 55 - 135 U/L Final     AST   Date Value Ref Range Status   03/27/2017 20 10 - 40 U/L Final   03/27/2017 20 10 - 40 U/L Final     ALT   Date Value Ref Range Status   03/27/2017 16 10 - 44 U/L Final   03/27/2017 16 10 - 44 U/L Final     Anion Gap   Date Value Ref Range Status   03/27/2017 10 8 - 16 mmol/L Final   03/27/2017 10 8 - 16 mmol/L Final     eGFR if    Date Value Ref Range Status   03/27/2017 46 (A) >60 mL/min/1.73 m^2 Final   03/27/2017 46 (A) >60 mL/min/1.73 m^2 Final     eGFR if non    Date Value Ref Range Status   03/27/2017 40 (A) >60 mL/min/1.73 m^2 Final     Comment:     Calculation used to obtain the estimated glomerular filtration  rate (eGFR) is the CKD-EPI equation. Since race is  unknown   in our information system, the eGFR values for   -American and Non--American patients are given   for each creatinine result.     03/27/2017 40 (A) >60 mL/min/1.73 m^2 Final     Comment:     Calculation used to obtain the estimated glomerular filtration  rate (eGFR) is the CKD-EPI equation. Since race is unknown   in our information system, the eGFR values for   -American and Non--American patients are given   for each creatinine result.       Lab Results   Component Value Date    SEDRATE 30 (H) 03/27/2017     Lab Results   Component Value Date    CRP 9.9 (H) 03/27/2017       X ray chest with Left apical pleural-parenchymal changes   Results for MEGAN RAJPUT (MRN [id number]) as of 2/27/2017 14:39   Ref. Range 1/23/2017 14:42   MG HEP-2 Titer Unknown Positive 1:160 Ce...   Anti-SSA Antibody Latest Ref Range: 0.00 - 19.99 EU 0.00   Anti-SSA Interpretation Latest Ref Range: Negative  Negative   Anti-SSB Antibody Latest Ref Range: 0.00 - 19.99 EU    Anti-SSB Interpretation Latest Ref Range: Negative     ds DNA Ab Latest Ref Range: Negative 1:10     Anti Sm Antibody Latest Ref Range: 0.00 - 19.99 EU    Anti-Sm Interpretation Latest Ref Range: Negative     Anti Sm/RNP Antibody Latest Ref Range: 0.00 - 19.99 EU    Anti-Sm/RNP Interpretation Latest Ref Range: Negative     CCP Antibodies Latest Ref Range: <5.0 U/mL 312.5 (H)   Rheumatoid Factor Latest Ref Range: 0.0 - 15.0 IU/mL 42.0 (H)       The L1 to L4 vertebral bone mineral density is equal to 0.747 g/cm squared with a T score of -2.7.    The left femoral neck bone mineral density is equal to 1.220 g/cm squared with a T score of 3.3.  Bone mineral density of the femoral neck is artificially elevated due to positioning and possible sclerotic change within the femoral neck.  Assessment:   Seropositive erosive rheumatoid arthritis with severe deformities-not been on medications from [id number]- DAS28 3.26 -currently on leflunomide  10 mg  Fibrosis of lungs- awaiting pulmonary referral  Mild thrombocytopenia-will continue to monitor  Osteoporosis with fractures  Hypovitamin D deficiency  Leg cramps- ?  Secondary to anemia -follow with Dr Alvarez   CK D3  Plan:   Increase leflunomide to 20 mg a day  Cont vit d 50,000u a week  for 12 weeks  Awaiting Pulmonary referral  Start on Prolia for osteoporosis after baseline dental exam  Discussed side effects of Prolia, hand out provided  Counseled for fall prevention  Follow-up with Dr. Alvarez for leg cramps-consider EMG    Return to clinic in 3 months, labs in 6 weeks

## 2017-03-28 ENCOUNTER — TELEPHONE (OUTPATIENT)
Dept: RHEUMATOLOGY | Facility: CLINIC | Age: 82
End: 2017-03-28

## 2017-03-28 ASSESSMENT — DISEASE ACTIVITY SCORE (DAS28)
GLOBAL_HEALTH_SCORE: 3
TOTAL_SCORE_ESR: 3.26
TENDER_JOINTS_COUNT: 1
SWOLLEN_JOINTS_COUNT: 1
ESR_MM_PER_HR: 30

## 2017-03-28 NOTE — TELEPHONE ENCOUNTER
Danitza,   Please make sure that patient has been scheduled for CBC and CMP in 6 weeks.   Please add MHAQ score from yesterday.   Thanks SS

## 2017-04-05 ENCOUNTER — TELEPHONE (OUTPATIENT)
Dept: RHEUMATOLOGY | Facility: CLINIC | Age: 82
End: 2017-04-05

## 2017-04-05 NOTE — TELEPHONE ENCOUNTER
----- Message from Noreen Jeffery sent at 4/5/2017  8:30 AM CDT -----  Contact: self  Patient wants to speak with a nurse regarding new medication, states after lab results she was supposed to receive a new medication. Please call back at [phone] (home)

## 2017-04-05 NOTE — TELEPHONE ENCOUNTER
Spoke to pt, she had questions about prolia. She thought it was a pill she would start after lab work. Advised that prolia was an injection received at [facility] infusion and that Dr. EMMA trevino advises that would start after pt has a baseline dental exam. Pt advises she had to cancel dental exam due to transportation, and would not be able to due injections due to transportation. Advised nurse would discuss with Dr. Peraza for other options.

## 2017-04-21 ENCOUNTER — TELEPHONE (OUTPATIENT)
Dept: FAMILY MEDICINE | Facility: CLINIC | Age: 82
End: 2017-04-21

## 2017-04-21 DIAGNOSIS — K21.9 GASTROESOPHAGEAL REFLUX DISEASE WITHOUT ESOPHAGITIS: Primary | ICD-10-CM

## 2017-04-21 RX ORDER — PANTOPRAZOLE SODIUM 40 MG/1
40 TABLET, DELAYED RELEASE ORAL DAILY
Qty: 30 TABLET | Refills: 0 | Status: SHIPPED | OUTPATIENT
Start: 2017-04-21 | End: 2017-04-30 | Stop reason: SDUPTHER

## 2017-04-21 NOTE — TELEPHONE ENCOUNTER
----- Message from Aspen Colon sent at 4/21/2017  2:25 PM CDT -----  Contact: Patient  Patient thinks her ulcer is acting up. Please send a prescription for Tantoprazole Sodium, 40 mg sent to [facility] on . Any questions call patient at [phone].

## 2017-04-21 NOTE — TELEPHONE ENCOUNTER
Patient notified that Rx was sent to her pharmacy and if symptom get worst to call the office. Patient verbalized understanding.

## 2017-04-21 NOTE — TELEPHONE ENCOUNTER
Patient states her stomach feels empty and feels nausea after eating.  Requesting a refill on Pantotrazole Sodium 40mg.  Medication is not in her medication list    Patient has an appt on Friday 4/28/17

## 2017-04-27 ENCOUNTER — DOCUMENTATION ONLY (OUTPATIENT)
Dept: FAMILY MEDICINE | Facility: CLINIC | Age: 82
End: 2017-04-27

## 2017-04-27 DIAGNOSIS — E55.9 HYPOVITAMINOSIS D: ICD-10-CM

## 2017-04-27 NOTE — PROGRESS NOTES
Pre-Visit Chart Review  For Appointment Scheduled on 4/28/17    Health Maintenance Due   Topic Date Due    Lipid Panel  04/06/2017

## 2017-04-28 ENCOUNTER — OFFICE VISIT (OUTPATIENT)
Dept: CARDIOLOGY | Facility: CLINIC | Age: 82
End: 2017-04-28
Payer: MEDICARE

## 2017-04-28 ENCOUNTER — OFFICE VISIT (OUTPATIENT)
Dept: FAMILY MEDICINE | Facility: CLINIC | Age: 82
End: 2017-04-28
Payer: MEDICARE

## 2017-04-28 VITALS
SYSTOLIC BLOOD PRESSURE: 135 MMHG | DIASTOLIC BLOOD PRESSURE: 63 MMHG | RESPIRATION RATE: 18 BRPM | BODY MASS INDEX: 18.18 KG/M2 | OXYGEN SATURATION: 99 % | HEART RATE: 95 BPM | WEIGHT: 109.13 LBS | HEIGHT: 65 IN

## 2017-04-28 VITALS
TEMPERATURE: 98 F | BODY MASS INDEX: 16.76 KG/M2 | DIASTOLIC BLOOD PRESSURE: 58 MMHG | WEIGHT: 104.25 LBS | SYSTOLIC BLOOD PRESSURE: 100 MMHG | HEIGHT: 66 IN | OXYGEN SATURATION: 95 %

## 2017-04-28 DIAGNOSIS — G47.19 EXCESSIVE DAYTIME SLEEPINESS: ICD-10-CM

## 2017-04-28 DIAGNOSIS — E78.2 MIXED HYPERLIPIDEMIA: ICD-10-CM

## 2017-04-28 DIAGNOSIS — I10 ESSENTIAL HYPERTENSION: Primary | ICD-10-CM

## 2017-04-28 DIAGNOSIS — Q61.3 POLYCYSTIC KIDNEY DISEASE: ICD-10-CM

## 2017-04-28 DIAGNOSIS — G25.81 RLS (RESTLESS LEGS SYNDROME): ICD-10-CM

## 2017-04-28 DIAGNOSIS — I35.0 NONRHEUMATIC AORTIC VALVE STENOSIS: Primary | ICD-10-CM

## 2017-04-28 DIAGNOSIS — K21.9 GASTROESOPHAGEAL REFLUX DISEASE WITHOUT ESOPHAGITIS: ICD-10-CM

## 2017-04-28 DIAGNOSIS — Z86.73 HISTORY OF TIA (TRANSIENT ISCHEMIC ATTACK): ICD-10-CM

## 2017-04-28 DIAGNOSIS — R63.6 UNDERWEIGHT: ICD-10-CM

## 2017-04-28 DIAGNOSIS — R09.89 BILATERAL CAROTID BRUITS: ICD-10-CM

## 2017-04-28 DIAGNOSIS — D64.9 ANEMIA, UNSPECIFIED TYPE: ICD-10-CM

## 2017-04-28 DIAGNOSIS — E78.5 HYPERLIPIDEMIA, UNSPECIFIED HYPERLIPIDEMIA TYPE: ICD-10-CM

## 2017-04-28 DIAGNOSIS — I10 ESSENTIAL HYPERTENSION: ICD-10-CM

## 2017-04-28 PROCEDURE — 99205 OFFICE O/P NEW HI 60 MIN: CPT | Mod: S$PBB,,, | Performed by: INTERNAL MEDICINE

## 2017-04-28 PROCEDURE — 99499 UNLISTED E&M SERVICE: CPT | Mod: S$PBB,,, | Performed by: INTERNAL MEDICINE

## 2017-04-28 PROCEDURE — 99213 OFFICE O/P EST LOW 20 MIN: CPT | Mod: S$PBB,,, | Performed by: PHYSICIAN ASSISTANT

## 2017-04-28 PROCEDURE — 99499 UNLISTED E&M SERVICE: CPT | Mod: S$PBB,,, | Performed by: PHYSICIAN ASSISTANT

## 2017-04-28 PROCEDURE — 99999 PR PBB SHADOW E&M-EST. PATIENT-LVL III: CPT | Mod: PBBFAC,,, | Performed by: PHYSICIAN ASSISTANT

## 2017-04-28 PROCEDURE — 93005 ELECTROCARDIOGRAM TRACING: CPT | Mod: PBBFAC,PO | Performed by: INTERNAL MEDICINE

## 2017-04-28 PROCEDURE — 99999 PR PBB SHADOW E&M-EST. PATIENT-LVL III: CPT | Mod: PBBFAC,,, | Performed by: INTERNAL MEDICINE

## 2017-04-28 PROCEDURE — 99213 OFFICE O/P EST LOW 20 MIN: CPT | Mod: PBBFAC,PO | Performed by: INTERNAL MEDICINE

## 2017-04-28 PROCEDURE — 93010 ELECTROCARDIOGRAM REPORT: CPT | Mod: ,,, | Performed by: INTERNAL MEDICINE

## 2017-04-28 RX ORDER — ERGOCALCIFEROL 1.25 MG/1
50000 CAPSULE ORAL
Qty: 12 CAPSULE | Refills: 0 | Status: ON HOLD | OUTPATIENT
Start: 2017-04-28 | End: 2017-06-27 | Stop reason: CLARIF

## 2017-04-28 RX ORDER — PRAMIPEXOLE DIHYDROCHLORIDE 0.12 MG/1
0.12 TABLET ORAL NIGHTLY
Refills: 10 | COMMUNITY
Start: 2017-04-06 | End: 2017-11-27 | Stop reason: SINTOL

## 2017-04-28 RX ORDER — PRAVASTATIN SODIUM 10 MG/1
10 TABLET ORAL DAILY
Qty: 90 TABLET | Refills: 3 | Status: SHIPPED | OUTPATIENT
Start: 2017-04-28 | End: 2017-11-27 | Stop reason: SDUPTHER

## 2017-04-28 NOTE — PROGRESS NOTES
"Subjective:    Patient ID:  Melody Dupont is a 89 y.o. female who presents for evaluation of Hypertension; Carotid Artery Disease; and Valvular Heart Disease  For abnormal ECHO indicating mild to moderate aortic stenosis  PCP and referred by Dr. Loving  Neurologist: Dr. Meyers  Rheumatologist: Dr. Peraza  Lives alone, have 2 dogs, daughter-in-law, Caron, have POA, [phone]  Retired , retired January 2017    Patient is a new patient to me.    HPI Comments: WF with long standing RA, HTN noted and recurrent TIA since 2013 on 162 mg of ASA nightly per Dr. Meyers. Send for abnormal Echo showing mild to moderate aortic stenosis. Denies any of the classic symptoms of near-syncope, chest pain nor SOB. Generally feeling "pretty good". Able to stay active with 2 dogs. Do a little housework, walk with walker. Sit to cook. ECG shows NSR, low voltages and RV conduction delay.   ECHO 1/2017 - CONCLUSIONS     1 - Concentric remodeling.     2 - Normal left ventricular systolic function (EF 60-65%).     3 - Mild to moderate aortic stenosis, WALT = 1.62 cm2, mean gradient = 11.7 mmHg.     4 - Mild aortic regurgitation.     5 - Left ventricular diastolic dysfunction.     6 - Normal right ventricular systolic function .     7 - The estimated PA systolic pressure is 26 mmHg.     8 - Difficult windows.     Old chart reviewed.  CTA of Carotid 5/2011 - IMPRESSION:   1.  MINIMAL CAROTID ATHEROSCLEROSIS, NO SIGNIFICANT CAROTID STENOSIS.       2.  NONDISPLACED FRACTURE OF THE MEDIAL ASPECT OF THE RIGHT CLAVICLE.    CT of chest 3/2017 - Impression     1. Stable distribution of mild to moderate chronic interstitial lung disease characterized by nonspecific, peripheral and upper lobe predominant reticulation.  2.  No acute abnormality is seen.  3.  Diffuse atherosclerosis including coronary artery disease.  4.  Small pericardial effusion  5.  Polycystic kidneys including a stable hemorrhagic cyst in the upper left " kidney.         Review of Systems   Constitution: Positive for weakness. Negative for diaphoresis, fever, malaise/fatigue, night sweats and weight gain.   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 Negative for headaches, nosebleeds and tinnitus.    Eyes: Negative for visual disturbance.   Cardiovascular: Negative for chest pain, claudication, cyanosis, dyspnea on exertion, irregular heartbeat, leg swelling, near-syncope, orthopnea, palpitations and paroxysmal nocturnal dyspnea.   Respiratory: Negative for cough, shortness of breath, sleep disturbances due to breathing, snoring and wheezing.    Endocrine: Positive for cold intolerance. Negative for polydipsia and polyuria.   Hematologic/Lymphatic: Does not bruise/bleed easily.   Skin: Negative for color change, flushing, nail changes, poor wound healing and suspicious lesions.   Musculoskeletal: Positive for arthritis, joint pain and myalgias. Negative for falls, gout, joint swelling, muscle cramps and muscle weakness.   Gastrointestinal: Positive for constipation. Negative for heartburn, hematemesis, hematochezia, melena and nausea.   Genitourinary: Positive for nocturia.   Neurological: Positive for excessive daytime sleepiness, light-headedness (related to poor sleep) and numbness. Negative for disturbances in coordination, dizziness, focal weakness, loss of balance and vertigo.        Being treated for RLS   Psychiatric/Behavioral: Negative for depression and substance abuse. The patient does not have insomnia and is not nervous/anxious.      Garber score 11       Objective:    Physical Exam   Constitutional: She is oriented to person, place, and time. She appears well-developed and well-nourished.   HENT:   Head: Normocephalic.   Eyes: Conjunctivae and EOM are normal. Pupils are equal, round, and reactive to light.   Neck: Normal range of motion. Neck supple. No JVD present. No thyromegaly present.   Cardiovascular: Normal rate, regular  "rhythm and intact distal pulses.  Exam reveals no gallop and no friction rub.    Murmur heard.   Low-pitched machinery crescendo-decrescendo early systolic murmur is present with a grade of 2/6  at the upper right sternal border, upper left sternal border radiating to the neck  Pulses:       Carotid pulses are on the right side with bruit, and on the left side with bruit.       Posterior tibial pulses are 1+ on the right side, and 1+ on the left side.   Pulmonary/Chest: Effort normal and breath sounds normal. She has no rales. She exhibits no tenderness.   Abdominal: Soft. Bowel sounds are normal. There is no tenderness.   Waist 30"   Musculoskeletal: Normal range of motion. She exhibits no edema.   RA deformed hands   Lymphadenopathy:     She has no cervical adenopathy.   Neurological: She is alert and oriented to person, place, and time.   Skin: Skin is warm and dry. No rash noted.         Assessment:       1. Nonrheumatic aortic valve stenosis    2. Essential hypertension    3. RLS (restless legs syndrome)    4. Mixed hyperlipidemia, baseline LDL not available    5. History of TIA (transient ischemic attack)    6. Body mass index (BMI) less than or equal to 19 in adult    7. Polycystic kidney disease    8. Excessive daytime sleepiness    9. Bilateral carotid bruits         Plan:       Melody was seen today for hypertension, carotid artery disease and valvular heart disease.    Diagnoses and all orders for this visit:    Nonrheumatic aortic valve stenosis    Essential hypertension  -     EKG 12-lead    RLS (restless legs syndrome)    Mixed hyperlipidemia, baseline LDL not available  -     pravastatin (PRAVACHOL) 10 MG tablet; Take 1 tablet (10 mg total) by mouth once daily.    History of TIA (transient ischemic attack)  -     US Carotid Bilateral; Future  -     CAR Ultrasound doppler carotid bliateral; Future  -     pravastatin (PRAVACHOL) 10 MG tablet; Take 1 tablet (10 mg total) by mouth once daily.    Body mass " index (BMI) less than or equal to 19 in adult    Polycystic kidney disease    Excessive daytime sleepiness    Bilateral carotid bruits  -     US Carotid Bilateral; Future  -     CAR Ultrasound doppler carotid bliateral; Future  -     pravastatin (PRAVACHOL) 10 MG tablet; Take 1 tablet (10 mg total) by mouth once daily.    - Instruction for Mediterranean diet and heart healthy exercise given.  - Phone review  - Mild to moderate AS without symptoms  - Recommend at least annual cardiovascular evaluation in view of her significant risk factors.    Patient Active Problem List   Diagnosis    History of TIA (transient ischemic attack)    HTN (hypertension), onset 2014    Mixed hyperlipidemia, baseline LDL not available    Gastroesophageal reflux disease without esophagitis    Rheumatoid arthritis involving multiple sites with positive rheumatoid factor, onset 1972    Body mass index (BMI) less than or equal to 19 in adult    Underweight    Chronic kidney disease, stage III (moderate)    Carotid disease, bilateral    Abdominal aortic atherosclerosis    Malnutrition    Thrombocytopenia    Anemia    Hypovitaminosis D    Encounter for monitoring leflunomide therapy    Osteoporosis    Nonrheumatic aortic valve stenosis    RLS (restless legs syndrome)    Polycystic kidney disease    Excessive daytime sleepiness    Bilateral carotid bruits     Total face-to-face time with the patient was 50 minutes and greater than 50% was spent in counseling and coordination of care. The above assessment and plan have been discussed at length. Referring physician's note reviewed. Labs and procedure over the last 6 months reviewed. Problem List updated. Asked to bring in all active medications / pills bottles with next visit.

## 2017-04-28 NOTE — PATIENT INSTRUCTIONS
Discharge Instructions for Aortic Valve Stenosis  You have been diagnosed with aortic valve stenosis. This means the aortic valve in your heart is stiff or remains in a near-closed position and has trouble opening. Because of this, your heart has to work harder to push the blood through the valve. In some cases, this extra work makes the muscle of the heart thicken. The extra work can tire the heart and cause its muscle to weaken over time. This condition often changes very slowly and doesn't need to be treated. But in some people, it may get worse faster and need to be treated. Some people can control their stenosis with medicines. In some severe cases, surgery is needed.  Home care  · Check with your doctor before taking any over-the-counter medicines, herbal products, or vitamins.  · Take your medicines exactly as directed. Dont skip doses.  · Keep all follow-up appointments. Some people with aortic stenosis dont have symptoms. Others need close follow-up and surgery.  Lifestyle changes  · Maintain a healthy weight. Get help to lose any extra pounds.  · Ask your doctor if an exercise program is right for you. Some people with aortic stenosis need to be very careful about exercise as it can result in fainting.  · Break the smoking habit. Enroll in a stop-smoking program to improve your chances of success.  Follow-up care  Make a follow-up appointment with your healthcare provider, or as directed.  When to call 911  Call 911 or go to the emergency room right away if you have any of the following:  · Chest pain or shortness of breath  · Weakness in the muscles of your face, arms, or legs  · Trouble speaking  · Rapid pulse or pounding heartbeat  · Fainting or dizziness  · Sudden vertigo   Date Last Reviewed: 6/1/2016  © 9413-3005 [employer]. [address] [place] [address], [place] [address]. All rights reserved. This information is not intended as a substitute for professional medical care. Always follow  your healthcare professional's instructions.        For Caregivers: Preventing Another Stroke    Having had a stroke, your loved one is at higher risk of having another. Medication, exercise, and diet are keys to reducing this risk. Your loved one needs to be active each day now. Walking is a good way to get daily exercise. You might also ask the doctor to refer you to a dietitian. This specialist in nutrition can help you reduce many common risk factors for stroke. Quitting smoking is also critical to reduce risk of stroke. Talk with your loved one about quitting smoking. Ask his or her healthcare provider for help.   Taking medicine  Your loved one may take more than 1 type of medicine. Each must be used as directed. If medicines include a blood thinner, your loved one may need regular blood tests.  Tips for safe medicine use  · Make sure medicines are taken on schedule. If timing is vital, set an alarm.  · Keep pill doses in a divided tray. A pill box can help.   · Know which foods or liquids the patient should avoid while taking prescribed medicines.  Reducing the risk of stroke  Many factors that increase the risk of stroke can be reduced. Your loved ones doctor and a dietitian can advise ways to:  · Lower high blood pressure  · Improve cholesterol  · Control heart disease  · Manage diabetes  · Lose excess weight  · Reduce risk of blood clots by taking blood thinners as recommended by the healthcare provider  · Stay active with aerobic and muscle-strengthening exercises  Warning signs of another stroke  If your loved one suddenly has any of the problems below, call 911 immediately for emergency medical help:  · Numbness or weakness of the face, arms, or legs, especially on 1 side  · Confusion or trouble speaking or understanding  · Trouble seeing in 1 or both eyes  · Trouble walking, dizziness, loss of balance or coordination  · Severe headache with no known cause  · Loss of consciousness or a  seizure   F.A.S.T. is an easy way to remember the signs of a stroke. When you see these signs, you will know that you need to call 911 fast. F.A.S.T. stands for:  · F is for face drooping - One side of the face is drooping or numb. When the person smiles, the smile is uneven.  · A is for arm weakness - One arm is weak or numb. When the person lifts both arms at the same time, one arm may drift downward.  · S is for speech difficulty -  You may notice slurred speech or difficulty speaking. The person can't repeat a simple sentence correctly when asked.  · T is for time to call 911 - If someone shows any of these symptoms, even if they go away, call 911 immediately. Make note of the time the symptoms first appeared.   Date Last Reviewed: 8/26/2015 © 2000-2016 [employer]. [address] [place] [address]. All rights reserved. This information is not intended as a substitute for professional medical care. Always follow your healthcare professional's instructions.        Heart Valve Problems: Aortic Stenosis  Aortic stenosis means your aortic valve has a problem opening. The left ventricle has to work harder to push the blood through the valve. In some cases, this extra work will make the muscle of the ventricle thicken. In time, the extra work can tire the heart and cause the heart muscle to weaken. Stenosis usually get worse slowly, over many years. But sometimes it can quickly get worse.  Possible causes  Calcium deposits can form on the aortic valve as you get older. These deposits make the valve stiff and hard to open. In some cases, you may have been born with an abnormal aortic valve. Or your aortic valve may have been damaged by rheumatic fever or a heart infection.        Open aortic valve with stenosis (viewed from above).      Treating aortic stenosis  In many cases, treatment wont be needed unless you have symptoms. If you do have symptoms, medicines may help relieve them. If the  stenosis is severe, your doctor may recommend surgery to replace the valve, even if you dont have symptoms.  Date Last Reviewed: 6/1/2016 © 2000-2016 [employer]. [address] [place] [address], [place] [address]. All rights reserved. This information is not intended as a substitute for professional medical care. Always follow your healthcare professional's instructions.        Discharge Instructions for Stroke  You have been diagnosed with a stroke, or with a TIA (transient ischemic attack). Or you have been identified as having a high risk for stroke. During a stroke, blood stops flowing to part of your brain. This can damage areas in the brain that control other parts of the body. Symptoms after a stroke depend on which part of the brain has been affected.  Stroke risk factors  Once youve had a stroke, youre at greater risk for another one. Listed below are some other factors that can increase your risk for a stroke:  · High blood pressure  · High cholesterol  · Cigarette or cigar smoking  · Diabetes  · Carotid or other artery disease  · Atrial fibrillation, atrial flutter, or other heart disease  · Not being physically active  · Obesity  · Certain blood disorders (such as sickle cell anemia)  · Excessive alcohol use  · Abuse of street drugs  · Race  · Gender  · Family history of stroke  · Diet high in salty, fried, or greasy foods  Changes in daily living  Doing your regular tasks may be difficult after youve had a stroke, but you can learn new ways to manage your daily activities. In fact, doing daily activities may help you to regain muscle strength and bring back function to affected limbs. Be patient, give yourself time to adjust, and appreciate the progress you make.  Daily activities  You may be at risk of falling. Make changes to your home to help you walk more easily. A therapist will decide if you need an assistive device to walk safely.  You may need to see an occupational therapist or  physical therapist to learn new ways of doing things. For example, you may need to make adjustments when bathing or dressing:  Tips for showering or bathing  · Test the water temperature with a hand or foot that was not affected by the stroke.  · Use grab bars, a shower seat, a hand-held showerhead, and a long-handled brush.  Tips for getting dressed  · Dress while sitting, starting with the affected side or limb.  · Wear shirts that pull easily over your head. Wear pants or skirts with elastic waistbands.  · Use zippers with loops attached to the pull tabs.  Lifestyle changes  · Take your medicines exactly as directed. Dont skip doses.  · Begin an exercise program. Ask your provider how to get started. Also ask how much activity you should try to get on a daily or weekly basis. You can benefit from simple activities such as walking or gardening.  · Limit alcohol intake. Men should have no more than 2 alcoholic drinks a day. Women should limit themselves to 1 alcoholic drink per day.  · Know your cholesterol level. Follow your providers recommendations about how to keep cholesterol under control.  · If you are a smoker, quit now. Join a stop-smoking program to improve your chances of success. Ask your provider about medicines or other methods to help you quit.  · Learn stress management techniques to help you deal with stress in your home and work life.  Diet  Your healthcare provider will give you information on dietary changes that you may need to make, based on your situation. Your provider may recommend that you see a registered dietitian for help with diet changes. Changes may include:  · Reducing fat and cholesterol intake  · Reducing salt (sodium) intake, especially if you have high blood pressure  · Eating more fresh vegetables and fruits  · Eating more lean proteins, such as fish, poultry, and beans and peas (legumes)  · Eating less red meat and processed meats  · Using low-fat dairy products  · Limiting  vegetable oils and nut oils  · Limiting sweets and processed foods such as chips, cookies, and baked goods  Follow-up care  · Keep your medical appointments. Close follow-up is important to stroke rehabilitation and recovery.  · Some medicines require blood tests to check for progress or problems. Keep follow-up appointments for any blood tests ordered by your providers.  When to call 911  Call 911 right away if you have any of the following symptoms of stroke:  · Weakness, tingling, or loss of feeling on one side of your face or body  · Sudden double vision or trouble seeing in one or both eyes  · Sudden trouble talking or slurred speech  · Trouble understanding others  · Sudden, severe headache  · Dizziness, loss of balance, or a sense of falling  · Blackouts or seizures      F.A.S.T. is an easy way to remember the signs of stroke. When you see these signs, you know that you need to call 911 fast.  F.A.S.T. stands for:  · F is for face drooping. One side of the face is drooping or numb. When the person smiles, the smile is uneven.  · A is for arm weakness. One arm is weak or numb. When the person lifts both arms at the same time, one arm may drift downward.  · S is for speech difficulty. You may notice slurred speech or trouble speaking. The person can't repeat a simple sentence correctly when asked.  · T is for time to call 911. If someone shows any of these symptoms, even if they go away, call 911 immediately. Make note of the time the symptoms first appeared.  Date Last Reviewed: 8/26/2015 © 2000-2016 [email or web address]. [address] [place] [address], [place] [address]. All rights reserved. This information is not intended as a substitute for professional medical care. Always follow your healthcare professional's instructions.        Carotid Artery Problems: Blockage     A healthy carotid artery lets blood flow easily to the brain.   The blood carries oxygen and nutrients throughout the body. The carotid  arteries are large blood vessels that carry blood to the brain. Certain health problems can make the inside of the carotid arteries narrow and rough. Over time, this damage increases the chances of having a stroke. A stroke is a sudden loss of brain function.   Open carotid arteries  In a healthy carotid artery, the inside of the artery is open. The lining of the artery wall is also smooth. This lets blood flow freely from the heart to the brain. The brain gets all the oxygen and nutrients it needs to function well.  Narrowed carotid arteries  Health factors, such as high blood pressure, high cholesterol, smoking, and diabetes, can damage artery walls and make them rough. This allows cholesterol and other particles in the blood to stick to the artery walls and form plaque or fatty deposits. As the plaque builds up, it can narrow the artery. Blood may also collect on the plaque and form blood clots.  How a stroke happens     Emboli can enter the bloodstream and travel to the brain. Brain tissue is damaged when emboli block arteries in the brain.   A stroke can happen when plaque in the carotid artery ruptures. This can allow small pieces of plaque to break off into the bloodstream. At the same time, rupture can make more blood clots. The pieces of plaque and tiny blood clots or emboli can flow in the blood until they get stuck in a small blood vessel in the brain. This blocks blood flow to part of the brain and causes a stroke.  Date Last Reviewed: 6/8/2015  © 8872-8826 [employer]. [address] [place] [address]. All rights reserved. This information is not intended as a substitute for professional medical care. Always follow your healthcare professional's instructions.    Recommended Mediterranean dietEating Heart-Healthy Food: Using the DASH Plan  Eating for your heart doesnt have to be hard or boring. You just need to know how to make healthier choices. The DASH eating plan has been  developed to help you do just that. DASH stands for Dietary Approaches to Stop Hypertension. It is a plan that has been proven to be healthier for your heart and to lower your risk for high blood pressure. It can also help lower your risk for cancer, heart disease, osteoporosis, and diabetes.  Choosing from Each Food Group  Choose foods from each of the food groups below each day. Try to get the recommended number of servings for each food group. The serving numbers are based on a diet of 2,000 calories a day. Talk to your doctor if youre unsure about your calorie needs.  Grains   Servings: 7-8 a day  A serving is:  · 1 slice bread  · 1 ounce dry cereal  · half a cup cooked rice or pasta  Best choices: Whole grains and any grains high in fiber.  Vegetables   Servings: 4-5 a day  A serving is:  · 1 cup raw leafy vegetable  · Half a cup cooked vegetable  · Three-quarter cup vegetable juice  Best choices: Fresh or frozen vegetable prepared without too much added salt or fat.    Fruits   Servings: 4-5 a day  A serving is:  · Three-quarter cup fruit juice  · 1 medium fruit  · One-quarter cup dried fruit  · One-half cup fresh, frozen, or canned fruit  Best choices: A variety of fresh fruits of different colors. Whole fruits are a much better choice than fruit juices.  Low-fat or Fat Free Dairy   Servings: 2-3 a day  A serving is:  · 8 ounces milk  · 1 cup yogurt  · One and a half ounces cheese  Best choices: Skim or 1% milk, low-fat or fat free yogurt or buttermilk, and low-fat cheeses.       Meat, Poultry, Fish   Servings: 2 or fewer a day  A serving is:  · 3 ounces cooked meat, poultry, or fish  Best choices: Lean meats and fish. Trim away visible fat. Broil, roast, or boil instead of frying. Remove skin from poultry before eating.  Nuts, Seeds, Beans   Servings: 4-5 a week  A serving is:  · One third cup nuts (or one and a half ounces)  · 2 tablespoons sunflower seeds  · Half a cup cooked beans  Best choices: Dry  roasted nuts with no salt added, lentils, kidney beans, garbanzo beans, and whole jimenez beans.    Fats and Oils   Servings: 2 a day  A serving is:  · 1 teaspoon vegetable oil  · 1 teaspoon soft margarine  · 1 tablespoon low-fat mayonnaise  · 1 teaspoon regular mayonnaise  · 2 tablespoons light salad dressing  · 1 tablespoon regular salad dressing  Best choices: Monounsaturated and polyunsaturated fats such as olive, canola, or safflower oil.  Sweets   Servings: 5 a week or fewer  A serving is:  · 1 tablespoon sugar, maple syrup, or honey  · 1 tablespoon jam or jelly  · 1 half-ounce jelly beans (about 15)  · 8 ounces lemonade  Best choices: Dried fruit can be a satisfying sweet. Choose low-fat sweets when possible. And watch your serving sizes!       Highly recommend 30 minutes of exercise daily, can have Sunday off, with 2-3 sessions of muscle strengthening weekly. A  would be very helpful.

## 2017-04-28 NOTE — LETTER
April 28, 2017      Lady Loving MD  [address]  [place] [address]           [facility]  [address]  [place] [address]  Phone: [phone]          Patient: Melody Dupont   MR Number: [id number]   YOB: 1927   Date of Visit: 4/28/2017       Dear Dr. Lady Loving:    Thank you for referring Melody Dupont to me for evaluation. Attached you will find relevant portions of my assessment and plan of care.    If you have questions, please do not hesitate to call me. I look forward to following Melody Dupont along with you.    Sincerely,    Eleuterio Cristina MD    Enclosure  CC:  No Recipients    If you would like to receive this communication electronically, please contact [email or web address] or [phone] to request more information on [facility] access.    For providers and/or their staff who would like to refer a patient to [facility], please contact us through our one-stop-shop provider referral line, [facility] , at [phone].    If you feel you have received this communication in error or would no longer like to receive these types of communications, please e-mail [email or web address]

## 2017-04-28 NOTE — MR AVS SNAPSHOT
[facility] [place] - [facility]  [address], [address]  [place] [address]  Phone: [phone]                  Melody Dupont   2017 2:30 PM   Office Visit    Description:  Female : 1927   Provider:  Eleuterio Cristina MD   Department:  [facility] - [facility]           Reason for Visit     Hypertension     Carotid Artery Disease     Valvular Heart Disease           Diagnoses this Visit        Comments    Nonrheumatic aortic valve stenosis    -  Primary     Essential hypertension         RLS (restless legs syndrome)         Mixed hyperlipidemia         History of TIA (transient ischemic attack)         Body mass index (BMI) less than or equal to 19 in adult         Polycystic kidney disease         Excessive daytime sleepiness         Bilateral carotid bruits                To Do List           Future Appointments        Provider Department Dept Phone    2017 1:00 PM [facility] US1 [facility] [phone]    2017 10:20 AM LAB, [facility] [address] [facility] [phone]    2017 10:30 AM LAB, [facility] [place] [address] [facility] [phone]    2017 10:30 AM MD Jacquie [facility] - Rheumatology [phone]    2017 1:20 PM Lady Loving MD [facility] - Family Medicine [phone]      Goals (5 Years of Data)     None      Follow-Up and Disposition     Return in about 1 year (around 2018).    Follow-up and Disposition History       These Medications        Disp Refills Start End    pravastatin (PRAVACHOL) 10 MG tablet 90 tablet 3 2017     Take 1 tablet (10 mg total) by mouth once daily. - Oral    Pharmacy: [facility]/pharmacy #[id number] - [place] - [address] Ph #: [phone]         [facility]     [facility] -  Assistance  Unless otherwise directed by your provider, please contact [facility], our nurse care line that is available for / assistance.     Registered nurses  "in the [facility] provide: appointment scheduling, clinical advisement, health education, and other advisory services.  Call: [phone] (toll free)               Medications           Message regarding Medications     Verify the changes and/or additions to your medication regime listed below are the same as discussed with your clinician today.  If any of these changes or additions are incorrect, please notify your healthcare provider.        START taking these NEW medications        Refills    pravastatin (PRAVACHOL) 10 MG tablet 3    Sig: Take 1 tablet (10 mg total) by mouth once daily.    Class: Normal    Route: Oral           Verify that the below list of medications is an accurate representation of the medications you are currently taking.  If none reported, the list may be blank. If incorrect, please contact your healthcare provider. Carry this list with you in case of emergency.           Current Medications     aspirin (ECOTRIN) 81 MG EC tablet Take 162 mg by mouth once daily.    cyanocobalamin (VITAMIN B-12) 1000 MCG tablet Take 100 mcg by mouth once daily.    ergocalciferol (ERGOCALCIFEROL) 50,000 unit Cap Take 1 capsule (50,000 Units total) by mouth every 7 days.    leflunomide (ARAVA) 20 MG Tab Take 1 tablet (20 mg total) by mouth once daily.    lisinopril (PRINIVIL,ZESTRIL) 20 MG tablet Take 1 tablet (20 mg total) by mouth once daily.    pantoprazole (PROTONIX) 40 MG tablet Take 1 tablet (40 mg total) by mouth once daily.    pramipexole (MIRAPEX) 0.125 MG tablet Take 0.125 mg by mouth every evening.    pravastatin (PRAVACHOL) 10 MG tablet Take 1 tablet (10 mg total) by mouth once daily.           Clinical Reference Information           Your Vitals Were     BP Pulse Resp Height Weight SpO2    135/63 (BP Location: Right arm, Patient Position: Sitting) 95 18 5' 5" (1.651 m) 49.5 kg (109 lb 2 oz) 99%    BMI                18.16 kg/m2          Blood Pressure          Most Recent Value    BP  " 135/63      Allergies as of 4/28/2017     Antihistamines - Alkylamine    Plavix [Clopidogrel]    Tramadol    Triamcinolone    Penicillins      Immunizations Administered on Date of Encounter - 4/28/2017     None      Orders Placed During Today's Visit      Normal Orders This Visit    EKG 12-lead     Future Labs/Procedures Expected by Expires    US Carotid Bilateral  4/28/2017 4/28/2018    CAR Ultrasound doppler carotid bliateral  As directed 4/28/2018      Instructions      Discharge Instructions for Aortic Valve Stenosis  You have been diagnosed with aortic valve stenosis. This means the aortic valve in your heart is stiff or remains in a near-closed position and has trouble opening. Because of this, your heart has to work harder to push the blood through the valve. In some cases, this extra work makes the muscle of the heart thicken. The extra work can tire the heart and cause its muscle to weaken over time. This condition often changes very slowly and doesn't need to be treated. But in some people, it may get worse faster and need to be treated. Some people can control their stenosis with medicines. In some severe cases, surgery is needed.  Home care  · Check with your doctor before taking any over-the-counter medicines, herbal products, or vitamins.  · Take your medicines exactly as directed. Dont skip doses.  · Keep all follow-up appointments. Some people with aortic stenosis dont have symptoms. Others need close follow-up and surgery.  Lifestyle changes  · Maintain a healthy weight. Get help to lose any extra pounds.  · Ask your doctor if an exercise program is right for you. Some people with aortic stenosis need to be very careful about exercise as it can result in fainting.  · Break the smoking habit. Enroll in a stop-smoking program to improve your chances of success.  Follow-up care  Make a follow-up appointment with your healthcare provider, or as directed.  When to call 911  Call 911 or go to the  emergency room right away if you have any of the following:  · Chest pain or shortness of breath  · Weakness in the muscles of your face, arms, or legs  · Trouble speaking  · Rapid pulse or pounding heartbeat  · Fainting or dizziness  · Sudden vertigo   Date Last Reviewed: 6/1/2016 © 2000-2016 [employer]. [address] [place] [address], [place] [address]. All rights reserved. This information is not intended as a substitute for professional medical care. Always follow your healthcare professional's instructions.        For Caregivers: Preventing Another Stroke    Having had a stroke, your loved one is at higher risk of having another. Medication, exercise, and diet are keys to reducing this risk. Your loved one needs to be active each day now. Walking is a good way to get daily exercise. You might also ask the doctor to refer you to a dietitian. This specialist in nutrition can help you reduce many common risk factors for stroke. Quitting smoking is also critical to reduce risk of stroke. Talk with your loved one about quitting smoking. Ask his or her healthcare provider for help.   Taking medicine  Your loved one may take more than 1 type of medicine. Each must be used as directed. If medicines include a blood thinner, your loved one may need regular blood tests.  Tips for safe medicine use  · Make sure medicines are taken on schedule. If timing is vital, set an alarm.  · Keep pill doses in a divided tray. A pill box can help.   · Know which foods or liquids the patient should avoid while taking prescribed medicines.  Reducing the risk of stroke  Many factors that increase the risk of stroke can be reduced. Your loved ones doctor and a dietitian can advise ways to:  · Lower high blood pressure  · Improve cholesterol  · Control heart disease  · Manage diabetes  · Lose excess weight  · Reduce risk of blood clots by taking blood thinners as recommended by the healthcare provider  · Stay active with aerobic  and muscle-strengthening exercises  Warning signs of another stroke  If your loved one suddenly has any of the problems below, call 911 immediately for emergency medical help:  · Numbness or weakness of the face, arms, or legs, especially on 1 side  · Confusion or trouble speaking or understanding  · Trouble seeing in 1 or both eyes  · Trouble walking, dizziness, loss of balance or coordination  · Severe headache with no known cause  · Loss of consciousness or a seizure   F.A.S.T. is an easy way to remember the signs of a stroke. When you see these signs, you will know that you need to call 911 fast. F.A.S.T. stands for:  · F is for face drooping - One side of the face is drooping or numb. When the person smiles, the smile is uneven.  · A is for arm weakness - One arm is weak or numb. When the person lifts both arms at the same time, one arm may drift downward.  · S is for speech difficulty -  You may notice slurred speech or difficulty speaking. The person can't repeat a simple sentence correctly when asked.  · T is for time to call 911 - If someone shows any of these symptoms, even if they go away, call 911 immediately. Make note of the time the symptoms first appeared.   Date Last Reviewed: 8/26/2015 © 2000-2016 [employer]. [address] [place] [address], [place] [address]. All rights reserved. This information is not intended as a substitute for professional medical care. Always follow your healthcare professional's instructions.        Heart Valve Problems: Aortic Stenosis  Aortic stenosis means your aortic valve has a problem opening. The left ventricle has to work harder to push the blood through the valve. In some cases, this extra work will make the muscle of the ventricle thicken. In time, the extra work can tire the heart and cause the heart muscle to weaken. Stenosis usually get worse slowly, over many years. But sometimes it can quickly get worse.  Possible causes  Calcium deposits can form on  the aortic valve as you get older. These deposits make the valve stiff and hard to open. In some cases, you may have been born with an abnormal aortic valve. Or your aortic valve may have been damaged by rheumatic fever or a heart infection.        Open aortic valve with stenosis (viewed from above).      Treating aortic stenosis  In many cases, treatment wont be needed unless you have symptoms. If you do have symptoms, medicines may help relieve them. If the stenosis is severe, your doctor may recommend surgery to replace the valve, even if you dont have symptoms.  Date Last Reviewed: 6/1/2016 © 2000-2016 [employer]. [address] [place] [address], [place] [address]. All rights reserved. This information is not intended as a substitute for professional medical care. Always follow your healthcare professional's instructions.        Discharge Instructions for Stroke  You have been diagnosed with a stroke, or with a TIA (transient ischemic attack). Or you have been identified as having a high risk for stroke. During a stroke, blood stops flowing to part of your brain. This can damage areas in the brain that control other parts of the body. Symptoms after a stroke depend on which part of the brain has been affected.  Stroke risk factors  Once youve had a stroke, youre at greater risk for another one. Listed below are some other factors that can increase your risk for a stroke:  · High blood pressure  · High cholesterol  · Cigarette or cigar smoking  · Diabetes  · Carotid or other artery disease  · Atrial fibrillation, atrial flutter, or other heart disease  · Not being physically active  · Obesity  · Certain blood disorders (such as sickle cell anemia)  · Excessive alcohol use  · Abuse of street drugs  · Race  · Gender  · Family history of stroke  · Diet high in salty, fried, or greasy foods  Changes in daily living  Doing your regular tasks may be difficult after youve had a stroke, but you can learn new  ways to manage your daily activities. In fact, doing daily activities may help you to regain muscle strength and bring back function to affected limbs. Be patient, give yourself time to adjust, and appreciate the progress you make.  Daily activities  You may be at risk of falling. Make changes to your home to help you walk more easily. A therapist will decide if you need an assistive device to walk safely.  You may need to see an occupational therapist or physical therapist to learn new ways of doing things. For example, you may need to make adjustments when bathing or dressing:  Tips for showering or bathing  · Test the water temperature with a hand or foot that was not affected by the stroke.  · Use grab bars, a shower seat, a hand-held showerhead, and a long-handled brush.  Tips for getting dressed  · Dress while sitting, starting with the affected side or limb.  · Wear shirts that pull easily over your head. Wear pants or skirts with elastic waistbands.  · Use zippers with loops attached to the pull tabs.  Lifestyle changes  · Take your medicines exactly as directed. Dont skip doses.  · Begin an exercise program. Ask your provider how to get started. Also ask how much activity you should try to get on a daily or weekly basis. You can benefit from simple activities such as walking or gardening.  · Limit alcohol intake. Men should have no more than 2 alcoholic drinks a day. Women should limit themselves to 1 alcoholic drink per day.  · Know your cholesterol level. Follow your providers recommendations about how to keep cholesterol under control.  · If you are a smoker, quit now. Join a stop-smoking program to improve your chances of success. Ask your provider about medicines or other methods to help you quit.  · Learn stress management techniques to help you deal with stress in your home and work life.  Diet  Your healthcare provider will give you information on dietary changes that you may need to make, based  on your situation. Your provider may recommend that you see a registered dietitian for help with diet changes. Changes may include:  · Reducing fat and cholesterol intake  · Reducing salt (sodium) intake, especially if you have high blood pressure  · Eating more fresh vegetables and fruits  · Eating more lean proteins, such as fish, poultry, and beans and peas (legumes)  · Eating less red meat and processed meats  · Using low-fat dairy products  · Limiting vegetable oils and nut oils  · Limiting sweets and processed foods such as chips, cookies, and baked goods  Follow-up care  · Keep your medical appointments. Close follow-up is important to stroke rehabilitation and recovery.  · Some medicines require blood tests to check for progress or problems. Keep follow-up appointments for any blood tests ordered by your providers.  When to call 911  Call 911 right away if you have any of the following symptoms of stroke:  · Weakness, tingling, or loss of feeling on one side of your face or body  · Sudden double vision or trouble seeing in one or both eyes  · Sudden trouble talking or slurred speech  · Trouble understanding others  · Sudden, severe headache  · Dizziness, loss of balance, or a sense of falling  · Blackouts or seizures      F.A.S.T. is an easy way to remember the signs of stroke. When you see these signs, you know that you need to call 911 fast.  F.A.S.T. stands for:  · F is for face drooping. One side of the face is drooping or numb. When the person smiles, the smile is uneven.  · A is for arm weakness. One arm is weak or numb. When the person lifts both arms at the same time, one arm may drift downward.  · S is for speech difficulty. You may notice slurred speech or trouble speaking. The person can't repeat a simple sentence correctly when asked.  · T is for time to call 911. If someone shows any of these symptoms, even if they go away, call 911 immediately. Make note of the time the symptoms first  appeared.  Date Last Reviewed: 8/26/2015  © 1490-0157 [employer]. [address] [place] [address], [place] [address]. All rights reserved. This information is not intended as a substitute for professional medical care. Always follow your healthcare professional's instructions.        Carotid Artery Problems: Blockage     A healthy carotid artery lets blood flow easily to the brain.   The blood carries oxygen and nutrients throughout the body. The carotid arteries are large blood vessels that carry blood to the brain. Certain health problems can make the inside of the carotid arteries narrow and rough. Over time, this damage increases the chances of having a stroke. A stroke is a sudden loss of brain function.   Open carotid arteries  In a healthy carotid artery, the inside of the artery is open. The lining of the artery wall is also smooth. This lets blood flow freely from the heart to the brain. The brain gets all the oxygen and nutrients it needs to function well.  Narrowed carotid arteries  Health factors, such as high blood pressure, high cholesterol, smoking, and diabetes, can damage artery walls and make them rough. This allows cholesterol and other particles in the blood to stick to the artery walls and form plaque or fatty deposits. As the plaque builds up, it can narrow the artery. Blood may also collect on the plaque and form blood clots.  How a stroke happens     Emboli can enter the bloodstream and travel to the brain. Brain tissue is damaged when emboli block arteries in the brain.   A stroke can happen when plaque in the carotid artery ruptures. This can allow small pieces of plaque to break off into the bloodstream. At the same time, rupture can make more blood clots. The pieces of plaque and tiny blood clots or emboli can flow in the blood until they get stuck in a small blood vessel in the brain. This blocks blood flow to part of the brain and causes a stroke.  Date Last Reviewed: 6/8/2015  ©  4741-3243 [employer]. [address] [place] [address], [place] [address]. All rights reserved. This information is not intended as a substitute for professional medical care. Always follow your healthcare professional's instructions.    Recommended Mediterranean dietEating Heart-Healthy Food: Using the DASH Plan  Eating for your heart doesnt have to be hard or boring. You just need to know how to make healthier choices. The DASH eating plan has been developed to help you do just that. DASH stands for Dietary Approaches to Stop Hypertension. It is a plan that has been proven to be healthier for your heart and to lower your risk for high blood pressure. It can also help lower your risk for cancer, heart disease, osteoporosis, and diabetes.  Choosing from Each Food Group  Choose foods from each of the food groups below each day. Try to get the recommended number of servings for each food group. The serving numbers are based on a diet of 2,000 calories a day. Talk to your doctor if youre unsure about your calorie needs.  Grains   Servings: 7-8 a day  A serving is:  · 1 slice bread  · 1 ounce dry cereal  · half a cup cooked rice or pasta  Best choices: Whole grains and any grains high in fiber.  Vegetables   Servings: 4-5 a day  A serving is:  · 1 cup raw leafy vegetable  · Half a cup cooked vegetable  · Three-quarter cup vegetable juice  Best choices: Fresh or frozen vegetable prepared without too much added salt or fat.    Fruits   Servings: 4-5 a day  A serving is:  · Three-quarter cup fruit juice  · 1 medium fruit  · One-quarter cup dried fruit  · One-half cup fresh, frozen, or canned fruit  Best choices: A variety of fresh fruits of different colors. Whole fruits are a much better choice than fruit juices.  Low-fat or Fat Free Dairy   Servings: 2-3 a day  A serving is:  · 8 ounces milk  · 1 cup yogurt  · One and a half ounces cheese  Best choices: Skim or 1% milk, low-fat or fat free yogurt or buttermilk,  and low-fat cheeses.       Meat, Poultry, Fish   Servings: 2 or fewer a day  A serving is:  · 3 ounces cooked meat, poultry, or fish  Best choices: Lean meats and fish. Trim away visible fat. Broil, roast, or boil instead of frying. Remove skin from poultry before eating.  Nuts, Seeds, Beans   Servings: 4-5 a week  A serving is:  · One third cup nuts (or one and a half ounces)  · 2 tablespoons sunflower seeds  · Half a cup cooked beans  Best choices: Dry roasted nuts with no salt added, lentils, kidney beans, garbanzo beans, and whole jimenez beans.    Fats and Oils   Servings: 2 a day  A serving is:  · 1 teaspoon vegetable oil  · 1 teaspoon soft margarine  · 1 tablespoon low-fat mayonnaise  · 1 teaspoon regular mayonnaise  · 2 tablespoons light salad dressing  · 1 tablespoon regular salad dressing  Best choices: Monounsaturated and polyunsaturated fats such as olive, canola, or safflower oil.  Sweets   Servings: 5 a week or fewer  A serving is:  · 1 tablespoon sugar, maple syrup, or honey  · 1 tablespoon jam or jelly  · 1 half-ounce jelly beans (about 15)  · 8 ounces lemonade  Best choices: Dried fruit can be a satisfying sweet. Choose low-fat sweets when possible. And watch your serving sizes!       Highly recommend 30 minutes of exercise daily, can have Sunday off, with 2-3 sessions of muscle strengthening weekly. A  would be very helpful.       Language Assistance Services     ATTENTION: Language assistance services are available, free of charge. Please call [phone].      ATENCIÓN: Si habla vivienneañol, tiene a kingsley disposición servicios gratuitos de asistencia lingüística. Llame al [phone].     CHÚ Ý: N?u b?n nói Ti?ng Vi?t, có các d?ch v? h? tr? ngôn ng? mi?n phí dành cho b?n. G?i s? [phone].         [facility] [place] [facility] complies with applicable Federal civil rights laws and does not discriminate on the basis of race, color, national origin, age, disability, or sex.

## 2017-04-28 NOTE — MR AVS SNAPSHOT
[facility]  [address]  [place] [address]  Phone: [phone]  Fax: [phone]                  Melody Dupont   2017 1:40 PM   Office Visit    Description:  Female : 1927   Provider:  SHRUTHI Vazquez   Department:  [place] [facility]           Reason for Visit     follow up hypertension, hyperlipidemia                To Do List           Future Appointments        Provider Department Dept Phone    2017 2:30 PM Eleuterio Cristina MD [place] [facility] Card[facility] [phone]    2017 10:20 AM LAB, [facility] [address] [facility] [phone]    2017 10:30 AM LAB, MOB [address] [facility] [phone]    2017 10:30 AM Shelby Peraza MD [place] [facility] [phone]    2017 1:20 PM Lady Chacko MD [facility] [phone]      Goals (5 Years of Data)     None      Follow-Up and Disposition     Return for dr. chacko in 3 months.      [facility]     [facility] Care [facility] -  Assistance  Unless otherwise directed by your provider, please contact [facility], our nurse care line that is available for  assistance.     Registered nurses in the [facility] provide: appointment scheduling, clinical advisement, health education, and other advisory services.  Call: [phone] (toll free)               Medications           Message regarding Medications     Verify the changes and/or additions to your medication regime listed below are the same as discussed with your clinician today.  If any of these changes or additions are incorrect, please notify your healthcare provider.        STOP taking these medications     pravastatin (PRAVACHOL) 10 MG tablet Take 1 tablet (10 mg total) by mouth once daily.    HERBAL DRUGS ORAL Take by mouth. Restful legs           Verify that the below list of medications is an accurate representation of the medications you  "are currently taking.  If none reported, the list may be blank. If incorrect, please contact your healthcare provider. Carry this list with you in case of emergency.           Current Medications     aspirin (ECOTRIN) 81 MG EC tablet Take 162 mg by mouth once daily.    cyanocobalamin (VITAMIN B-12) 1000 MCG tablet Take 100 mcg by mouth once daily.    ergocalciferol (ERGOCALCIFEROL) 50,000 unit Cap Take 1 capsule (50,000 Units total) by mouth every 7 days.    leflunomide (ARAVA) 20 MG Tab Take 1 tablet (20 mg total) by mouth once daily.    lisinopril (PRINIVIL,ZESTRIL) 20 MG tablet Take 1 tablet (20 mg total) by mouth once daily.    pantoprazole (PROTONIX) 40 MG tablet Take 1 tablet (40 mg total) by mouth once daily.    pramipexole (MIRAPEX) 0.125 MG tablet Take 0.375 mg by mouth every evening.           Clinical Reference Information           Your Vitals Were     BP Temp Height Weight SpO2 BMI    100/58 (BP Location: Right arm, Patient Position: Sitting, BP Method: Manual) 98 °F (36.7 °C) (Oral) 5' 5.5" (1.664 m) 47.3 kg (104 lb 4.4 oz) 95% 17.09 kg/m2      Blood Pressure          Most Recent Value    BP  (!)  100/58      Allergies as of 4/28/2017     Antihistamines - Alkylamine    Plavix [Clopidogrel]    Tramadol    Triamcinolone    Penicillins      Immunizations Administered on Date of Encounter - 4/28/2017     None      Language Assistance Services     ATTENTION: Language assistance services are available, free of charge. Please call [phone].      ATENCIÓN: Si janela zuri, tiene a kingsley disposición servicios gratuitos de asistencia lingüística. Llame al [phone].     MAURILIO Ý: N?u b?n nói Ti?ng Vi?t, có các d?ch v? h? tr? ngôn ng? mi?n phí dành cho b?n. G?i s? [phone].         [facility] complies with applicable Federal civil rights laws and does not discriminate on the basis of race, color, national origin, age, disability, or sex.        "

## 2017-05-01 RX ORDER — PANTOPRAZOLE SODIUM 40 MG/1
40 TABLET, DELAYED RELEASE ORAL DAILY
Qty: 30 TABLET | Refills: 0 | Status: SHIPPED | OUTPATIENT
Start: 2017-05-01 | End: 2017-11-27 | Stop reason: SDUPTHER

## 2017-05-05 ENCOUNTER — OFFICE VISIT (OUTPATIENT)
Dept: GASTROENTEROLOGY | Facility: CLINIC | Age: 82
End: 2017-05-05
Payer: MEDICARE

## 2017-05-05 VITALS
WEIGHT: 108.44 LBS | HEART RATE: 104 BPM | RESPIRATION RATE: 20 BRPM | BODY MASS INDEX: 18.07 KG/M2 | HEIGHT: 65 IN | DIASTOLIC BLOOD PRESSURE: 67 MMHG | SYSTOLIC BLOOD PRESSURE: 99 MMHG

## 2017-05-05 DIAGNOSIS — R13.14 PHARYNGOESOPHAGEAL DYSPHAGIA: Primary | ICD-10-CM

## 2017-05-05 DIAGNOSIS — R13.10 DYSPHAGIA, UNSPECIFIED TYPE: Primary | ICD-10-CM

## 2017-05-05 PROCEDURE — 99204 OFFICE O/P NEW MOD 45 MIN: CPT | Mod: S$PBB,,, | Performed by: INTERNAL MEDICINE

## 2017-05-05 PROCEDURE — 99999 PR PBB SHADOW E&M-EST. PATIENT-LVL III: CPT | Mod: PBBFAC,,, | Performed by: INTERNAL MEDICINE

## 2017-05-05 PROCEDURE — 99213 OFFICE O/P EST LOW 20 MIN: CPT | Mod: PBBFAC,PO | Performed by: INTERNAL MEDICINE

## 2017-05-05 NOTE — PROGRESS NOTES
[facility]     CC: dysphagia    HPI 89 y.o. female presents for evaluation of 2 weeks of progressively worsening dysphagia to solids that occurs intermittently.  She has a sensation that foods are sticking in her mid chest with eating.  No nausea or pain with swallowing.  No GI bleeding currently.  She has a h/o PUD associated with heavy NSAID use 4 years ago.  She stopped all her NSAIDs and has been on protonix daily since that time.  She also notes that she has some increased abdominal cramping over the last 2 weeks.    Past Medical History:   Diagnosis Date    Anemia     Cataract     Disorder of kidney and ureter     Hyperlipidemia     Hypertension     Malnutrition     Osteoporosis     Rheumatoid arthritis     TIA (transient ischemic attack) 1/23/12       Past Surgical History:   Procedure Laterality Date    ADENOIDECTOMY      FRACTURE SURGERY      left hip ORIF    HAND SURGERY      HYSTERECTOMY      complete    TONSILLECTOMY         Social History   Substance Use Topics    Smoking status: Never Smoker    Smokeless tobacco: None    Alcohol use No       Family History   Problem Relation Age of Onset    Heart disease Mother     Heart disease Father     Cancer Brother      stomach cancer    No Known Problems Paternal Grandmother     No Known Problems Paternal Grandfather     Cancer Brother      duodenal cancer    Alcohol abuse Brother     Parkinsonism Brother     Alcohol abuse Brother     No Known Problems Son        Review of Systems  General ROS: negative for - chills, fever or weight loss  Psychological ROS: negative for - hallucination, depression or suicidal ideation  Ophthalmic ROS: negative for - blurry vision, photophobia or eye pain  ENT ROS: negative for - epistaxis, sore throat or rhinorrhea  Respiratory ROS: no cough, shortness of breath, or wheezing  Cardiovascular ROS: no chest pain or dyspnea on exertion  Gastrointestinal ROS: +dysphagia; no nausea or  "melena  Genito-Urinary ROS: no dysuria, trouble voiding, or hematuria  Musculoskeletal ROS: negative for - gait disturbance or muscular weakness  Neurological ROS: no syncope or seizures; no ataxia  Dermatological ROS: negative for pruritis, rash and jaundice    Physical Examination  BP 99/67  Pulse 104  Resp 20  Ht 5' 5" (1.651 m)  Wt 49.2 kg (108 lb 7.5 oz)  BMI 18.05 kg/m2  General appearance: alert, cooperative, no distress  HENT: Normocephalic, atraumatic, neck symmetrical, no nasal discharge   Eyes: conjunctivae/corneas clear, PERRL, EOM's intact  Lungs: clear to auscultation bilaterally, no dullness to percussion bilaterally  Heart: regular rate and rhythm without rub; no displacement of the PMI   Abdomen: soft, NT ND BS Present no organomegaly  Extremities:  no clubbing, cyanosis, or edema; bony deformity of the hands bilaterally c/w changes of chronic arthritis.  Integument: Skin color, texture, turgor normal; no rashes; hair distrubution normal  Neurologic: Alert and oriented X 3, normal strength, normal coordination and gait  Psychiatric: no pressured speech; normal affect; no evidence of impaired cognition     Labs:  H/H 11/35    Assessment:   88 yo female with worsening solid food dysphagia associated with a prior h/o PUD.    Plan:  No NSAIDs  Continue protonix 40 mg daily  Schedule EGD with possible esophageal dilation.    Chuckie Gama MD  [facility]  [address], [address]  [place] [address]  Office: [phone]  Fax: [phone]    "

## 2017-05-11 ENCOUNTER — ANESTHESIA EVENT (OUTPATIENT)
Dept: ENDOSCOPY | Facility: HOSPITAL | Age: 82
End: 2017-05-11
Payer: MEDICARE

## 2017-05-11 ENCOUNTER — ANESTHESIA (OUTPATIENT)
Dept: ENDOSCOPY | Facility: HOSPITAL | Age: 82
End: 2017-05-11
Payer: MEDICARE

## 2017-05-11 ENCOUNTER — SURGERY (OUTPATIENT)
Age: 82
End: 2017-05-11

## 2017-05-11 ENCOUNTER — HOSPITAL ENCOUNTER (OUTPATIENT)
Facility: HOSPITAL | Age: 82
Discharge: HOME | End: 2017-05-11
Attending: INTERNAL MEDICINE | Admitting: INTERNAL MEDICINE
Payer: MEDICARE

## 2017-05-11 VITALS
SYSTOLIC BLOOD PRESSURE: 139 MMHG | HEIGHT: 66 IN | OXYGEN SATURATION: 97 % | WEIGHT: 105 LBS | HEART RATE: 69 BPM | BODY MASS INDEX: 16.88 KG/M2 | TEMPERATURE: 97 F | DIASTOLIC BLOOD PRESSURE: 65 MMHG | RESPIRATION RATE: 16 BRPM

## 2017-05-11 VITALS — RESPIRATION RATE: 75 BRPM

## 2017-05-11 DIAGNOSIS — R13.10 DYSPHAGIA: ICD-10-CM

## 2017-05-11 PROCEDURE — 43248 EGD GUIDE WIRE INSERTION: CPT | Mod: ,,, | Performed by: INTERNAL MEDICINE

## 2017-05-11 PROCEDURE — 88305 TISSUE EXAM BY PATHOLOGIST: CPT | Mod: 26,,, | Performed by: PATHOLOGY

## 2017-05-11 PROCEDURE — 25000003 PHARM REV CODE 250

## 2017-05-11 PROCEDURE — 43239 EGD BIOPSY SINGLE/MULTIPLE: CPT | Performed by: INTERNAL MEDICINE

## 2017-05-11 PROCEDURE — D9220A PRA ANESTHESIA: Mod: CRNA,,, | Performed by: NURSE ANESTHETIST, CERTIFIED REGISTERED

## 2017-05-11 PROCEDURE — 43248 EGD GUIDE WIRE INSERTION: CPT | Performed by: INTERNAL MEDICINE

## 2017-05-11 PROCEDURE — 25000003 PHARM REV CODE 250: Performed by: INTERNAL MEDICINE

## 2017-05-11 PROCEDURE — D9220A PRA ANESTHESIA: Mod: ANES,,, | Performed by: ANESTHESIOLOGY

## 2017-05-11 PROCEDURE — 37000009 HC ANESTHESIA EA ADD 15 MINS: Performed by: INTERNAL MEDICINE

## 2017-05-11 PROCEDURE — 37000008 HC ANESTHESIA 1ST 15 MINUTES: Performed by: INTERNAL MEDICINE

## 2017-05-11 PROCEDURE — 43239 EGD BIOPSY SINGLE/MULTIPLE: CPT | Mod: ,,, | Performed by: INTERNAL MEDICINE

## 2017-05-11 PROCEDURE — 27201012 HC FORCEPS, HOT/COLD, DISP: Performed by: INTERNAL MEDICINE

## 2017-05-11 PROCEDURE — 88305 TISSUE EXAM BY PATHOLOGIST: CPT | Mod: 59 | Performed by: PATHOLOGY

## 2017-05-11 PROCEDURE — 63600175 PHARM REV CODE 636 W HCPCS

## 2017-05-11 RX ORDER — LIDOCAINE HYDROCHLORIDE 20 MG/ML
INJECTION, SOLUTION EPIDURAL; INFILTRATION; INTRACAUDAL; PERINEURAL
Status: COMPLETED
Start: 2017-05-11 | End: 2017-05-11

## 2017-05-11 RX ORDER — SODIUM CHLORIDE 9 MG/ML
INJECTION, SOLUTION INTRAVENOUS CONTINUOUS
Status: DISCONTINUED | OUTPATIENT
Start: 2017-05-11 | End: 2017-05-11 | Stop reason: HOSPADM

## 2017-05-11 RX ORDER — PROPOFOL 10 MG/ML
INJECTION, EMULSION INTRAVENOUS
Status: COMPLETED
Start: 2017-05-11 | End: 2017-05-11

## 2017-05-11 RX ADMIN — PROPOFOL 20 MG: 10 INJECTION, EMULSION INTRAVENOUS at 11:05

## 2017-05-11 RX ADMIN — SODIUM CHLORIDE 1000 ML: 0.9 INJECTION, SOLUTION INTRAVENOUS at 10:05

## 2017-05-11 RX ADMIN — PROPOFOL 40 MG: 10 INJECTION, EMULSION INTRAVENOUS at 11:05

## 2017-05-11 RX ADMIN — LIDOCAINE HYDROCHLORIDE 100 MG: 20 INJECTION, SOLUTION EPIDURAL; INFILTRATION; INTRACAUDAL; PERINEURAL at 11:05

## 2017-05-11 NOTE — DISCHARGE INSTRUCTIONS
"Discharge Instructions: After Your Surgery/Procedure  Youve just had surgery. During surgery you were given medicine called anesthesia to keep you relaxed and free of pain. After surgery you may have some pain or nausea. This is common. Here are some tips for feeling better and getting well after surgery.     Stay on schedule with your medication.   Going home  Your doctor or nurse will show you how to take care of yourself when you go home. He or she will also answer your questions. Have an adult family member or friend drive you home.      For your safety we recommend these precaution for the first 24 hours after your procedure:  · Do not drive or use heavy equipment.  · Do not make important decisions or sign legal papers.  · Do not drink alcohol.  · Have someone stay with you, if needed. He or she can watch for problems and help keep you safe.  · Your concentration, balance, coordination, and judgement may be impaired for many hours after anesthesia.  Use caution when ambulating or standing up.     · You may feel weak and "washed out" after anesthesia and surgery.      Subtle residual effects of general anesthesia or sedation with regional / local anesthesia can last more than 24 hours.  Rest for the remainder of the day or longer if your Doctor/Surgeon has advised you to do so.  Although you may feel normal within the first 24 hours, your reflexes and mental ability may be impaired without you realizing it.  You may feel dizzy, lightheaded or sleepy for 24 hours or longer.      Be sure to go to all follow-up visits with your doctor. And rest after your surgery for as long as your doctor tells you to.  Coping with pain  If you have pain after surgery, pain medicine will help you feel better. Take it as told, before pain becomes severe. Also, ask your doctor or pharmacist about other ways to control pain. This might be with heat, ice, or relaxation. And follow any other instructions your surgeon or nurse gives " you.  Tips for taking pain medicine  To get the best relief possible, remember these points:  · Pain medicines can upset your stomach. Taking them with a little food may help.  · Most pain relievers taken by mouth need at least 20 to 30 minutes to start to work.  · Taking medicine on a schedule can help you remember to take it. Try to time your medicine so that you can take it before starting an activity. This might be before you get dressed, go for a walk, or sit down for dinner.  · Constipation is a common side effect of pain medicines. Call your doctor before taking any medicines such as laxatives or stool softeners to help ease constipation. Also ask if you should skip any foods. Drinking lots of fluids and eating foods such as fruits and vegetables that are high in fiber can also help. Remember, do not take laxatives unless your surgeon has prescribed them.  · Drinking alcohol and taking pain medicine can cause dizziness and slow your breathing. It can even be deadly. Do not drink alcohol while taking pain medicine.  · Pain medicine can make you react more slowly to things. Do not drive or run machinery while taking pain medicine.  Your health care provider may tell you to take acetaminophen to help ease your pain. Ask him or her how much you are supposed to take each day. Acetaminophen or other pain relievers may interact with your prescription medicines or other over-the-counter (OTC) drugs. Some prescription medicines have acetaminophen and other ingredients. Using both prescription and OTC acetaminophen for pain can cause you to overdose. Read the labels on your OTC medicines with care. This will help you to clearly know the list of ingredients, how much to take, and any warnings. It may also help you not take too much acetaminophen. If you have questions or do not understand the information, ask your pharmacist or health care provider to explain it to you before you take the OTC medicine.  Managing  nausea  Some people have an upset stomach after surgery. This is often because of anesthesia, pain, or pain medicine, or the stress of surgery. These tips will help you handle nausea and eat healthy foods as you get better. If you were on a special food plan before surgery, ask your doctor if you should follow it while you get better. These tips may help:  · Do not push yourself to eat. Your body will tell you when to eat and how much.  · Start off with clear liquids and soup. They are easier to digest.  · Next try semi-solid foods, such as mashed potatoes, applesauce, and gelatin, as you feel ready.  · Slowly move to solid foods. Dont eat fatty, rich, or spicy foods at first.  · Do not force yourself to have 3 large meals a day. Instead eat smaller amounts more often.  · Take pain medicines with a small amount of solid food, such as crackers or toast, to avoid nausea.     Call your surgeon if  · You still have pain an hour after taking medicine. The medicine may not be strong enough.  · You feel too sleepy, dizzy, or groggy. The medicine may be too strong.  · You have side effects like nausea, vomiting, or skin changes, such as rash, itching, or hives.       If you have obstructive sleep apnea  You were given anesthesia medicine during surgery to keep you comfortable and free of pain. After surgery, you may have more apnea spells because of this medicine and other medicines you were given. The spells may last longer than usual.   At home:  · Keep using the continuous positive airway pressure (CPAP) device when you sleep. Unless your health care provider tells you not to, use it when you sleep, day or night. CPAP is a common device used to treat obstructive sleep apnea.  · Talk with your provider before taking any pain medicine, muscle relaxants, or sedatives. Your provider will tell you about the possible dangers of taking these medicines.  © 3964-3787 [employer]. [address] [place] [address]  [place] [address]. All rights reserved. This information is not intended as a substitute for professional medical care. Always follow your healthcare professional's instructions.        Esophageal Dilation     A balloon dilator may be used to widen a stricture in the esophagus.   An esophageal dilation is a procedure used to widen a narrowed section of your esophagus. This is the tube that leads from your throat to your stomach. Narrowing (stricture) of the esophagus can cause problems. These include trouble swallowing. This sheet explains what to expect with esophageal dilation.  Why esophageal dilation is needed  Several problems can be treated with esophageal dilation. They include:  · Peptic stricture. This is caused by reflux esophagitis. With this problem, the esophagus is irritated by acid reflux (heartburn). This occurs when acid from your stomach flows back up into the esophagus. Stomach acid damages the lining of the esophagus. This leads to a buildup of scar tissue. As a result, the esophagus is narrowed.  · Schatzkis ring. This is an abnormal ring of tissue. It forms where the esophagus meets the stomach. It can cause trouble swallowing. It can also cause food to get stuck in the esophagus. The cause of this condition is not known.  · Achalasia. This condition stops food and liquids from moving into your stomach from the esophagus. It affects the lower esophageal sphincter (LES). The LES is a muscular ring that opens (relaxes) when you swallow. With achalasia, the LES does not relax. This condition can also cause problems with peristalsis. This is the normal muscular action of the esophagus that moves food into the stomach.  · Eosinophilic esophagitis. This is a redness and swelling (inflammation) in the esophagus. It is caused by an environmental trigger such as a food allergy. It can lead to pain, trouble swallowing, and strictures.  · Less common causes of stricture. Other causes of stricture include  radiation treatment and cancer.  Before you have esophageal dilation  · Tell your provider about any medicines you take. This includes prescription medicines, over-the-counter medicines, herbs, vitamins, and other supplements. Be sure to mention aspirin or any blood thinners youre taking.  · Let your provider know if you need to take antibiotics before dental procedures. You may need to take them before esophageal dilation as well.  · Tell your provider about any health conditions you have, such as heart or lung disease. Also mention any allergies to medicines.  · Youll need to have an empty stomach for the procedure. Follow your providers instructions for not eating and drinking before the procedure.  · Arrange to have a family member or friend drive you home after the procedure.  During the procedure  · You may be given local anesthesia to numb your throat. Youll also likely be given medicine to relax you. The procedure takes about 15 minutes. It does not cause trouble breathing.  · A tube called an endoscope (scope) is used. This is a narrow tube with a tiny light and camera at the end. The scope is inserted through your mouth and into your esophagus. It lets your provider see inside your esophagus. To help guide your provider, an imaging method called fluoroscopy may also be used. This creates a moving X-ray image on a computer screen.   · Next, special tiny tools are carefully guided through your mouth and down into the esophagus. They widen the stricture and are then removed. Different types of instruments are used. The type used depends on the size and cause of the stricture. Types include:  ¨ Balloon dilator. A tiny empty balloon is put into the stricture using an endoscope. The balloon is slowly filled with air. The air is removed from the balloon when the stricture is widened to the right size. Balloon dilators are used to treat many types of strictures.  ¨ Guided wire dilator. A thin wire is eased  down the esophagus. A small tube thats wider on one end is guided down the wire. It is put into the stricture to stretch it. These dilators are used to treat all kinds of strictures.  ¨ Bougies. These are weighted, cone-shaped tubes. Starting with smaller cones, your provider uses increasingly larger cones until the stricture is stretched the right amount. Bougies are often used to treat strictures that are simple (short, straight, and not very narrow).  After the procedure  · Youll be watched closely until your provider says youre ready to go home. Youll need to have a friend or family member drive you home.  · You may have a sore throat for the rest of the day.  · You may have pain behind your breastbone for a short time afterwards.  · You can start drinking fluids again after the numbness in your throat goes away. You can resume eating the same day or the next day.  Risks and possible complications  Risks and possible complications for esophageal dilation include:  · Infection  · A tear or hole in the esophagus lining, causing bleeding and possibly needing surgery to fix  · Risks of anesthesia  Follow-up  You may need to have the procedure repeated one or more times. This depends on the cause and extent of the narrowing. Repeat procedures can allow the dilation to take place more slowly. This reduces the risks of the procedure.  If your stricture was caused by reflux esophagitis, youll likely need to take medicine to treat that condition. Your provider will tell you more.  When to call your provider  Call your healthcare provider right away if you have any of the following after the procedure:  · Fever of 100.4°F (38.0°C)  · Chest pain  · Trouble swallowing  · Vomiting blood or material that looks like coffee grounds  · Bleeding  · Black, tarry, or bloody stools   Date Last Reviewed: 7/1/2016  © 3619-5343 [employer]. [address] [place] [address], [place] [address]. All rights reserved. This  information is not intended as a substitute for professional medical care. Always follow your healthcare professional's instructions.        Gastritis (Adult)    Gastritis is inflammation and irritation of the stomach lining. It can be present for a short time (acute) or be long lasting (chronic). Gastritis is often caused by infection with bacteria called H pylori. More than a third of people in the [place] have this bacteria in their bodies. In many cases, H pylori causes no problems or symptoms. In some people, though, the infection irritates the stomach lining and causes gastritis. Other causes of stomach irritation include drinking alcohol or taking pain-relieving medicines called NSAIDs (such as aspirin or ibuprofen).   Symptoms of gastritis can include:  · Abdominal pain or bloating  · Loss of appetite  · Nausea or vomiting  · Vomiting blood or having black stools  · Feeling more tired than usual  An inflamed and irritated stomach lining is more likely to develop a sore called an ulcer. To help prevent this, gastritis should be treated.  Home care  If needed, medicines may be prescribed. If you have H pylori infection, treating it will likely relieve your symptoms. Other changes can help reduce stomach irritation and help it heal.  · If you have been prescribed medicines for H pylori infection, take them as directed. Take all of the medicine until it is finished or your healthcare provider tells you to stop, even if you feel better.  · Your healthcare provider may recommend avoiding NSAIDs. If you take daily aspirin for your heart or other medical reasons, do not stop without talking to your healthcare provider first.  · Avoid drinking alcohol.  · Stop smoking. Smoking can irritate the stomach and delay healing. As much as possible, stay away from second hand smoke.  Follow-up care  Follow up with your healthcare provider, or as advised by our staff. Testing may be needed to check for inflammation or an  ulcer.  When to seek medical advice  Call your healthcare provider for any of the following:  · Stomach pain that gets worse or moves to the lower right abdomen (appendix area)  · Chest pain that appears or gets worse, or spreads to the back, neck, shoulder, or arm  · Frequent vomiting (cant keep down liquids)  · Blood in the stool or vomit (red or black in color)  · Feeling weak or dizzy  · Fever of 100.4ºF (38ºC) or higher, or as directed by your healthcare provider  Date Last Reviewed: 6/22/2015 © 2000-2016 [employer]. [address] [place] [address]. All rights reserved. This information is not intended as a substitute for professional medical care. Always follow your healthcare professional's instructions.    We hope your stay was comfortable as you heal now, mend and rest.    For we have enjoyed taking care of you by giving your our best.    And as you get better, by regaining your health and strength;   We count it as a privilege to have served you and hope your time at [facility] was well spent.      Thank  You!!!

## 2017-05-11 NOTE — PLAN OF CARE
Vss, krista po fluids, denies pain, ambulates easily.  States ready to go home.  Discharged from facility with family.

## 2017-05-11 NOTE — ANESTHESIA PREPROCEDURE EVALUATION
05/11/2017  Melody Dupont is a 89 y.o., female.    Anesthesia Evaluation    I have reviewed the Patient Summary Reports.    I have reviewed the Nursing Notes.   I have reviewed the Medications.     Review of Systems  Anesthesia Hx:  No problems with previous Anesthesia Denies Hx of Anesthetic complications    Social:  Non-Smoker    Hematology/Oncology:         -- Anemia:   Cardiovascular:   Hypertension Denies Valvular problems/Murmurs.  Denies CAD.    Denies Dysrhythmias.   Denies Angina. hyperlipidemia    Pulmonary:   Denies COPD.  Denies Asthma.  Denies Recent URI.    Renal/:   Chronic Renal Disease, CRI    Hepatic/GI:   GERD Denies Liver Disease.    Neurological:   TIA, Denies CVA. Denies Seizures.    Endocrine:   Denies Diabetes. Denies Hypothyroidism.        Physical Exam  General:  Well nourished    Airway/Jaw/Neck:  Airway Findings: Mouth Opening: Normal Tongue: Normal  General Airway Assessment: Adult, Good  Mallampati: II  Improves to II with phonation.  TM Distance: 4-6 cm      Dental:  Dental Findings: In tact   Chest/Lungs:  Chest/Lungs Findings: Clear to auscultation, Normal Respiratory Rate     Heart/Vascular:  Heart Findings: Rate: Normal  Rhythm: Regular Rhythm  Sounds: Normal  Heart murmur: negative       Mental Status:  Mental Status Findings:  Cooperative, Alert and Oriented         Anesthesia Plan  Type of Anesthesia, risks & benefits discussed:  Anesthesia Type:  general  Patient's Preference:   Intra-op Monitoring Plan:   Intra-op Monitoring Plan Comments:   Post Op Pain Control Plan:   Post Op Pain Control Plan Comments:   Induction:   IV  Beta Blocker:  Patient is not currently on a Beta-Blocker (No further documentation required).       Informed Consent: Patient understands risks and agrees with Anesthesia plan.  Questions answered. Anesthesia consent signed with  patient.  ASA Score: 3     Day of Surgery Review of History & Physical:    H&P update referred to the surgeon.         Ready For Surgery From Anesthesia Perspective.

## 2017-05-11 NOTE — ANESTHESIA POSTPROCEDURE EVALUATION
"Anesthesia Post Evaluation    Patient: Melody Dupont    Procedure(s) Performed: Procedure(s) (LRB):  ESOPHAGOGASTRODUODENOSCOPY (EGD) (N/A)    Final Anesthesia Type: general  Patient location during evaluation: PACU  Patient participation: Yes- Able to Participate  Level of consciousness: awake and alert and oriented  Post-procedure vital signs: reviewed and stable  Pain management: adequate  Airway patency: patent  PONV status at discharge: No PONV  Anesthetic complications: no      Cardiovascular status: blood pressure returned to baseline  Respiratory status: unassisted, spontaneous ventilation and room air  Hydration status: euvolemic  Follow-up not needed.        Visit Vitals    /65    Pulse 69    Temp 36.1 °C (97 °F) (Oral)    Resp 16    Ht 5' 5.5" (1.664 m)    Wt 47.6 kg (105 lb)    SpO2 97%    Breastfeeding No    BMI 17.21 kg/m2       Pain/Mike Score: Pain Assessment Performed: Yes (5/11/2017 11:42 AM)  Presence of Pain: denies (5/11/2017 12:02 PM)  Mike Score: 10 (5/11/2017 12:02 PM)      "

## 2017-05-11 NOTE — TRANSFER OF CARE
"Anesthesia Transfer of Care Note    Patient: Melody Dupont    Procedure(s) Performed: Procedure(s) (LRB):  ESOPHAGOGASTRODUODENOSCOPY (EGD) (N/A)    Patient location: PACU    Anesthesia Type: general    Transport from OR: Transported from OR on room air with adequate spontaneous ventilation    Post pain: adequate analgesia    Post assessment: no apparent anesthetic complications and tolerated procedure well    Post vital signs: stable    Level of consciousness: responds to stimulation and sedated    Nausea/Vomiting: no nausea/vomiting    Complications: none    Transfer of care protocol was followed      Last vitals:   Visit Vitals    BP (!) 160/72    Pulse 74    Temp 36.1 °C (97 °F) (Oral)    Resp 14    Ht 5' 5.5" (1.664 m)    Wt 47.6 kg (105 lb)    SpO2 100%    Breastfeeding No    BMI 17.21 kg/m2     "

## 2017-05-11 NOTE — H&P
"[facility]      CC: dysphagia     HPI 89 y.o. female presents for evaluation of 2 weeks of progressively worsening dysphagia to solids that occurs intermittently. She has a sensation that foods are sticking in her mid chest with eating. No nausea or pain with swallowing. No GI bleeding currently. She has a h/o PUD associated with heavy NSAID use 4 years ago. She stopped all her NSAIDs and has been on protonix daily since that time. She also notes that she has some increased abdominal cramping over the last 2 weeks.          Past Medical History:   Diagnosis Date    Anemia      Cataract      Disorder of kidney and ureter      Hyperlipidemia      Hypertension      Malnutrition      Osteoporosis      Rheumatoid arthritis      TIA (transient ischemic attack) 1/23/12      Review of Systems  General ROS: negative for - chills, fever or weight loss  Gastrointestinal ROS: +dysphagia; no nausea or melena     Physical Examination  BP (!) 144/65 (BP Location: Left arm, Patient Position: Lying, BP Method: Automatic)  Pulse 83  Temp 97 °F (36.1 °C) (Oral)   Resp 16  Ht 5' 5.5" (1.664 m)  Wt 47.6 kg (105 lb)  SpO2 97%  Breastfeeding? No  BMI 17.21 kg/m2    General appearance: alert, cooperative, no distress  Lungs: clear to auscultation bilaterally, no dullness to percussion bilaterally  Heart: regular rate and rhythm without rub; no displacement of the PMI   Abdomen: soft, NT ND BS Present no organomegaly       Labs:  H/H 11/35     Assessment:   89 yo female with worsening solid food dysphagia associated with a prior h/o PUD.     Plan:  EGD with dilation today     Chuckie Gama MD  [facility]  [address], [address]  [place] [address]  Office: [phone]  Fax: [phone]    "

## 2017-05-11 NOTE — IP AVS SNAPSHOT
[address] [place] [address]  Phone: [phone]           Patient Discharge Instructions   Our goal is to set you up for success. This packet includes information on your condition, medications, and your home care.  It will help you care for yourself to prevent having to return to the hospital.     Please ask your nurse if you have any questions.      There are many details to remember when preparing to leave the hospital. Here is what you will need to do:    1. Take your medicine. If you are prescribed medications, review your Medication List on the following pages. You may have new medications to  at the pharmacy and others that you'll need to stop taking. Review the instructions for how and when to take your medications. Talk with your doctor or nurses if you are unsure of what to do.     2. Go to your follow-up appointments. Specific follow-up information is listed in the following pages. Your may be contacted by a nurse or clinical provider about future appointments. Be sure we have all of the phone numbers to reach you. Please contact your provider's office if you are unable to make an appointment.     3. Watch for warning signs. Your doctor or nurse will give you detailed warning signs to watch for and when to call for assistance. These instructions may also include educational information about your condition. If you experience any of warning signs to your health, call your doctor.           [facility]  Unless otherwise directed by your provider, please   contact [facility], our nurse care line   that is available for 24/7 assistance.     [phone] (toll-free)     Registered nurses in the [facility]   provide: appointment scheduling, clinical advisement, health education, and other advisory services.                  ** Verify the list of medication(s) below is accurate and up to date. Carry this with you in case of  emergency. If your medications have changed, please notify your healthcare provider.             Medication List      CONTINUE taking these medications        Additional Info                      aspirin 81 MG EC tablet   Commonly known as:  ECOTRIN   Refills:  0   Dose:  162 mg    Instructions:  Take 162 mg by mouth once daily.     Begin Date    AM    Noon    PM    Bedtime       cyanocobalamin 1000 MCG tablet   Commonly known as:  VITAMIN B-12   Refills:  0   Dose:  100 mcg    Instructions:  Take 100 mcg by mouth once daily.     Begin Date    AM    Noon    PM    Bedtime       ergocalciferol 50,000 unit Cap   Commonly known as:  ERGOCALCIFEROL   Quantity:  12 capsule   Refills:  0   Dose:  23220 Units    Instructions:  Take 1 capsule (50,000 Units total) by mouth every 7 days.     Begin Date    AM    Noon    PM    Bedtime       leflunomide 20 MG Tab   Commonly known as:  ARAVA   Quantity:  30 tablet   Refills:  2   Dose:  20 mg    Instructions:  Take 1 tablet (20 mg total) by mouth once daily.     Begin Date    AM    Noon    PM    Bedtime       lisinopril 20 MG tablet   Commonly known as:  PRINIVIL,ZESTRIL   Quantity:  90 tablet   Refills:  3   Dose:  20 mg    Instructions:  Take 1 tablet (20 mg total) by mouth once daily.     Begin Date    AM    Noon    PM    Bedtime       pantoprazole 40 MG tablet   Commonly known as:  PROTONIX   Quantity:  30 tablet   Refills:  0   Dose:  40 mg    Instructions:  Take 1 tablet (40 mg total) by mouth once daily.     Begin Date    AM    Noon    PM    Bedtime       pramipexole 0.125 MG tablet   Commonly known as:  MIRAPEX   Refills:  10   Dose:  0.125 mg    Instructions:  Take 0.125 mg by mouth every evening.     Begin Date    AM    Noon    PM    Bedtime       pravastatin 10 MG tablet   Commonly known as:  PRAVACHOL   Quantity:  90 tablet   Refills:  3   Dose:  10 mg    Instructions:  Take 1 tablet (10 mg total) by mouth once daily.     Begin Date    AM    Noon    PM    Bedtime                   Please bring to all follow up appointments:    1. A copy of your discharge instructions.  2. All medicines you are currently taking in their original bottles.  3. Identification and insurance card.    Please arrive 15 minutes ahead of scheduled appointment time.    Please call 24 hours in advance if you must reschedule your appointment and/or time.        Your Scheduled Appointments     Jun 26, 2017 10:30 AM CDT   Non-Fasting Lab with LAB, MOB [address] DRAW [address]ATION   [facility] ([facility] [place] [facility])    [address]  [place] [address]   [phone]            Jun 27, 2017 10:30 AM CDT   Established Patient Visit with MD Jacquie Wrenll - Rheumatology ([facility] [place] [facility])    [address]  [place] [address]   [phone]            Aug 14, 2017  1:20 PM CDT   Established Patient Visit with MD Gemma Kwon - Family Medicine ([facility])    [address]  [place] [address]   [phone]                Discharge Instructions     Future Orders    Diet general     Questions:    Total calories:      Fat restriction, if any:      Protein restriction, if any:      Na restriction, if any:      Fluid restriction:      Additional restrictions:          Discharge Instructions       Discharge Instructions: After Your Surgery/Procedure  Youve just had surgery. During surgery you were given medicine called anesthesia to keep you relaxed and free of pain. After surgery you may have some pain or nausea. This is common. Here are some tips for feeling better and getting well after surgery.     Stay on schedule with your medication.   Going home  Your doctor or nurse will show you how to take care of yourself when you go home. He or she will also answer your questions. Have an adult family member or friend drive you home.      For your safety we recommend these precaution for the first 24 hours after your procedure:  · Do not drive or use  "heavy equipment.  · Do not make important decisions or sign legal papers.  · Do not drink alcohol.  · Have someone stay with you, if needed. He or she can watch for problems and help keep you safe.  · Your concentration, balance, coordination, and judgement may be impaired for many hours after anesthesia.  Use caution when ambulating or standing up.     · You may feel weak and "washed out" after anesthesia and surgery.      Subtle residual effects of general anesthesia or sedation with regional / local anesthesia can last more than 24 hours.  Rest for the remainder of the day or longer if your Doctor/Surgeon has advised you to do so.  Although you may feel normal within the first 24 hours, your reflexes and mental ability may be impaired without you realizing it.  You may feel dizzy, lightheaded or sleepy for 24 hours or longer.      Be sure to go to all follow-up visits with your doctor. And rest after your surgery for as long as your doctor tells you to.  Coping with pain  If you have pain after surgery, pain medicine will help you feel better. Take it as told, before pain becomes severe. Also, ask your doctor or pharmacist about other ways to control pain. This might be with heat, ice, or relaxation. And follow any other instructions your surgeon or nurse gives you.  Tips for taking pain medicine  To get the best relief possible, remember these points:  · Pain medicines can upset your stomach. Taking them with a little food may help.  · Most pain relievers taken by mouth need at least 20 to 30 minutes to start to work.  · Taking medicine on a schedule can help you remember to take it. Try to time your medicine so that you can take it before starting an activity. This might be before you get dressed, go for a walk, or sit down for dinner.  · Constipation is a common side effect of pain medicines. Call your doctor before taking any medicines such as laxatives or stool softeners to help ease constipation. Also ask " if you should skip any foods. Drinking lots of fluids and eating foods such as fruits and vegetables that are high in fiber can also help. Remember, do not take laxatives unless your surgeon has prescribed them.  · Drinking alcohol and taking pain medicine can cause dizziness and slow your breathing. It can even be deadly. Do not drink alcohol while taking pain medicine.  · Pain medicine can make you react more slowly to things. Do not drive or run machinery while taking pain medicine.  Your health care provider may tell you to take acetaminophen to help ease your pain. Ask him or her how much you are supposed to take each day. Acetaminophen or other pain relievers may interact with your prescription medicines or other over-the-counter (OTC) drugs. Some prescription medicines have acetaminophen and other ingredients. Using both prescription and OTC acetaminophen for pain can cause you to overdose. Read the labels on your OTC medicines with care. This will help you to clearly know the list of ingredients, how much to take, and any warnings. It may also help you not take too much acetaminophen. If you have questions or do not understand the information, ask your pharmacist or health care provider to explain it to you before you take the OTC medicine.  Managing nausea  Some people have an upset stomach after surgery. This is often because of anesthesia, pain, or pain medicine, or the stress of surgery. These tips will help you handle nausea and eat healthy foods as you get better. If you were on a special food plan before surgery, ask your doctor if you should follow it while you get better. These tips may help:  · Do not push yourself to eat. Your body will tell you when to eat and how much.  · Start off with clear liquids and soup. They are easier to digest.  · Next try semi-solid foods, such as mashed potatoes, applesauce, and gelatin, as you feel ready.  · Slowly move to solid foods. Dont eat fatty, rich, or spicy  foods at first.  · Do not force yourself to have 3 large meals a day. Instead eat smaller amounts more often.  · Take pain medicines with a small amount of solid food, such as crackers or toast, to avoid nausea.     Call your surgeon if  · You still have pain an hour after taking medicine. The medicine may not be strong enough.  · You feel too sleepy, dizzy, or groggy. The medicine may be too strong.  · You have side effects like nausea, vomiting, or skin changes, such as rash, itching, or hives.       If you have obstructive sleep apnea  You were given anesthesia medicine during surgery to keep you comfortable and free of pain. After surgery, you may have more apnea spells because of this medicine and other medicines you were given. The spells may last longer than usual.   At home:  · Keep using the continuous positive airway pressure (CPAP) device when you sleep. Unless your health care provider tells you not to, use it when you sleep, day or night. CPAP is a common device used to treat obstructive sleep apnea.  · Talk with your provider before taking any pain medicine, muscle relaxants, or sedatives. Your provider will tell you about the possible dangers of taking these medicines.  © 4153-8691 [employer]. [address] [place] [address], [place] [address]. All rights reserved. This information is not intended as a substitute for professional medical care. Always follow your healthcare professional's instructions.        Esophageal Dilation     A balloon dilator may be used to widen a stricture in the esophagus.   An esophageal dilation is a procedure used to widen a narrowed section of your esophagus. This is the tube that leads from your throat to your stomach. Narrowing (stricture) of the esophagus can cause problems. These include trouble swallowing. This sheet explains what to expect with esophageal dilation.  Why esophageal dilation is needed  Several problems can be treated with esophageal dilation.  They include:  · Peptic stricture. This is caused by reflux esophagitis. With this problem, the esophagus is irritated by acid reflux (heartburn). This occurs when acid from your stomach flows back up into the esophagus. Stomach acid damages the lining of the esophagus. This leads to a buildup of scar tissue. As a result, the esophagus is narrowed.  · Schatzkis ring. This is an abnormal ring of tissue. It forms where the esophagus meets the stomach. It can cause trouble swallowing. It can also cause food to get stuck in the esophagus. The cause of this condition is not known.  · Achalasia. This condition stops food and liquids from moving into your stomach from the esophagus. It affects the lower esophageal sphincter (LES). The LES is a muscular ring that opens (relaxes) when you swallow. With achalasia, the LES does not relax. This condition can also cause problems with peristalsis. This is the normal muscular action of the esophagus that moves food into the stomach.  · Eosinophilic esophagitis. This is a redness and swelling (inflammation) in the esophagus. It is caused by an environmental trigger such as a food allergy. It can lead to pain, trouble swallowing, and strictures.  · Less common causes of stricture. Other causes of stricture include radiation treatment and cancer.  Before you have esophageal dilation  · Tell your provider about any medicines you take. This includes prescription medicines, over-the-counter medicines, herbs, vitamins, and other supplements. Be sure to mention aspirin or any blood thinners youre taking.  · Let your provider know if you need to take antibiotics before dental procedures. You may need to take them before esophageal dilation as well.  · Tell your provider about any health conditions you have, such as heart or lung disease. Also mention any allergies to medicines.  · Youll need to have an empty stomach for the procedure. Follow your providers instructions for not eating  and drinking before the procedure.  · Arrange to have a family member or friend drive you home after the procedure.  During the procedure  · You may be given local anesthesia to numb your throat. Youll also likely be given medicine to relax you. The procedure takes about 15 minutes. It does not cause trouble breathing.  · A tube called an endoscope (scope) is used. This is a narrow tube with a tiny light and camera at the end. The scope is inserted through your mouth and into your esophagus. It lets your provider see inside your esophagus. To help guide your provider, an imaging method called fluoroscopy may also be used. This creates a moving X-ray image on a computer screen.   · Next, special tiny tools are carefully guided through your mouth and down into the esophagus. They widen the stricture and are then removed. Different types of instruments are used. The type used depends on the size and cause of the stricture. Types include:  ¨ Balloon dilator. A tiny empty balloon is put into the stricture using an endoscope. The balloon is slowly filled with air. The air is removed from the balloon when the stricture is widened to the right size. Balloon dilators are used to treat many types of strictures.  ¨ Guided wire dilator. A thin wire is eased down the esophagus. A small tube thats wider on one end is guided down the wire. It is put into the stricture to stretch it. These dilators are used to treat all kinds of strictures.  ¨ Bougies. These are weighted, cone-shaped tubes. Starting with smaller cones, your provider uses increasingly larger cones until the stricture is stretched the right amount. Bougies are often used to treat strictures that are simple (short, straight, and not very narrow).  After the procedure  · Youll be watched closely until your provider says youre ready to go home. Youll need to have a friend or family member drive you home.  · You may have a sore throat for the rest of the day.  · You  may have pain behind your breastbone for a short time afterwards.  · You can start drinking fluids again after the numbness in your throat goes away. You can resume eating the same day or the next day.  Risks and possible complications  Risks and possible complications for esophageal dilation include:  · Infection  · A tear or hole in the esophagus lining, causing bleeding and possibly needing surgery to fix  · Risks of anesthesia  Follow-up  You may need to have the procedure repeated one or more times. This depends on the cause and extent of the narrowing. Repeat procedures can allow the dilation to take place more slowly. This reduces the risks of the procedure.  If your stricture was caused by reflux esophagitis, youll likely need to take medicine to treat that condition. Your provider will tell you more.  When to call your provider  Call your healthcare provider right away if you have any of the following after the procedure:  · Fever of 100.4°F (38.0°C)  · Chest pain  · Trouble swallowing  · Vomiting blood or material that looks like coffee grounds  · Bleeding  · Black, tarry, or bloody stools   Date Last Reviewed: 7/1/2016 © 2000-2016 [employer]. [address] [place] [address]. All rights reserved. This information is not intended as a substitute for professional medical care. Always follow your healthcare professional's instructions.        Gastritis (Adult)    Gastritis is inflammation and irritation of the stomach lining. It can be present for a short time (acute) or be long lasting (chronic). Gastritis is often caused by infection with bacteria called H pylori. More than a third of people in the [place] have this bacteria in their bodies. In many cases, H pylori causes no problems or symptoms. In some people, though, the infection irritates the stomach lining and causes gastritis. Other causes of stomach irritation include drinking alcohol or taking pain-relieving medicines called  NSAIDs (such as aspirin or ibuprofen).   Symptoms of gastritis can include:  · Abdominal pain or bloating  · Loss of appetite  · Nausea or vomiting  · Vomiting blood or having black stools  · Feeling more tired than usual  An inflamed and irritated stomach lining is more likely to develop a sore called an ulcer. To help prevent this, gastritis should be treated.  Home care  If needed, medicines may be prescribed. If you have H pylori infection, treating it will likely relieve your symptoms. Other changes can help reduce stomach irritation and help it heal.  · If you have been prescribed medicines for H pylori infection, take them as directed. Take all of the medicine until it is finished or your healthcare provider tells you to stop, even if you feel better.  · Your healthcare provider may recommend avoiding NSAIDs. If you take daily aspirin for your heart or other medical reasons, do not stop without talking to your healthcare provider first.  · Avoid drinking alcohol.  · Stop smoking. Smoking can irritate the stomach and delay healing. As much as possible, stay away from second hand smoke.  Follow-up care  Follow up with your healthcare provider, or as advised by our staff. Testing may be needed to check for inflammation or an ulcer.  When to seek medical advice  Call your healthcare provider for any of the following:  · Stomach pain that gets worse or moves to the lower right abdomen (appendix area)  · Chest pain that appears or gets worse, or spreads to the back, neck, shoulder, or arm  · Frequent vomiting (cant keep down liquids)  · Blood in the stool or vomit (red or black in color)  · Feeling weak or dizzy  · Fever of 100.4ºF (38ºC) or higher, or as directed by your healthcare provider  Date Last Reviewed: 6/22/2015 © 2000-2016 [employer]. [address] [place] [address], [place] [address]. All rights reserved. This information is not intended as a substitute for professional medical care. Always  "follow your healthcare professional's instructions.    We hope your stay was comfortable as you heal now, mend and rest.    For we have enjoyed taking care of you by giving your our best.    And as you get better, by regaining your health and strength;   We count it as a privilege to have served you and hope your time at [facility] was well spent.      Thank  You!!!        Admission Information     Date & Time Provider Department CSN    5/11/2017  8:30 AM Chuckie Gama MD [facility] [id number]      Care Providers     Provider Role Specialty Primary office phone    Chuckie Gama MD Attending Provider Gastroenterology [phone]    Chuckie Gama MD Surgeon  Gastroenterology [phone]      Your Vitals Were     BP Pulse Temp Resp Height Weight    117/57 69 97 °F (36.1 °C) (Oral) 16 5' 5.5" (1.664 m) 47.6 kg (105 lb)    SpO2 BMI             97% 17.21 kg/m2         Recent Lab Values     No lab values to display.      Allergies as of 5/11/2017        Reactions    Antihistamines - Alkylamine Other (See Comments)    Other reaction(s): Hallucinations    Plavix [Clopidogrel] Hives    Tramadol     Triamcinolone Other (See Comments)    hallucinations    Penicillins Rash    Other reaction(s): Unknown      Advance Directives     An advance directive is a document which, in the event you are no longer able to make decisions for yourself, tells your healthcare team what kind of treatment you do or do not want to receive, or who you would like to make those decisions for you.  If you do not currently have an advance directive, [facility] encourages you to create one.  For more information call:  [phone]), [phone] ([phone]),  or log on to [email or web address].        Language Assistance Services     ATTENTION: Language assistance services are available, free of charge. Please call [phone].      ATENCIÓN: Si habla español, tiene a kingsley disposición servicios gratuitos de " asistencia lingüística. Qian rollins [phone].     MAURILIO Ý: N?u b?n nói Ti?ng Vi?t, có các d?ch v? h? tr? ngôn ng? mi?n phí dành cho b?n. G?i s? [phone].        Chronic Kindey Disease Education              [facility] complies with applicable Federal civil rights laws and does not discriminate on the basis of race, color, national origin, age, disability, or sex.

## 2017-05-12 ENCOUNTER — TELEPHONE (OUTPATIENT)
Dept: GASTROENTEROLOGY | Facility: CLINIC | Age: 82
End: 2017-05-12

## 2017-05-12 NOTE — TELEPHONE ENCOUNTER
----- Message from Barbara Seymour sent at 5/12/2017 10:02 AM CDT -----  Please call pt at [phone] / had a procedure yesterday .. Was not able to talk to Dr Gama ... Please call to discuss

## 2017-05-15 ENCOUNTER — TELEPHONE (OUTPATIENT)
Dept: GASTROENTEROLOGY | Facility: CLINIC | Age: 82
End: 2017-05-15

## 2017-05-15 NOTE — TELEPHONE ENCOUNTER
----- Message from Silvia Cruz sent at 5/15/2017  7:40 AM CDT -----  Contact:  call  //[phone]    Calling   For test  Results // please call

## 2017-05-22 DIAGNOSIS — R13.10 DYSPHAGIA, UNSPECIFIED TYPE: Primary | ICD-10-CM

## 2017-05-23 ENCOUNTER — TELEPHONE (OUTPATIENT)
Dept: GASTROENTEROLOGY | Facility: CLINIC | Age: 82
End: 2017-05-23

## 2017-05-23 NOTE — TELEPHONE ENCOUNTER
----- Message from Eden Bray sent at 5/19/2017 12:50 PM CDT -----  Contact: daughter in law , annabella   Call back   Procedure results  Sever problems eating   Please  Advise

## 2017-05-24 ENCOUNTER — TELEPHONE (OUTPATIENT)
Dept: GASTROENTEROLOGY | Facility: CLINIC | Age: 82
End: 2017-05-24

## 2017-05-24 NOTE — TELEPHONE ENCOUNTER
----- Message from Chuckie Gama MD sent at 5/24/2017  2:20 PM CDT -----  Please let this patient know that their gastric biopsies were negative for H. Pylori infection.

## 2017-06-01 ENCOUNTER — TELEPHONE (OUTPATIENT)
Dept: FAMILY MEDICINE | Facility: CLINIC | Age: 82
End: 2017-06-01

## 2017-06-01 ENCOUNTER — DOCUMENTATION ONLY (OUTPATIENT)
Dept: FAMILY MEDICINE | Facility: CLINIC | Age: 82
End: 2017-06-01

## 2017-06-01 NOTE — TELEPHONE ENCOUNTER
----- Message from Angela Tolentino sent at 6/1/2017  7:56 AM CDT -----  Contact: self  Patient called asking for advice about coming in to give a urine sample due to itchy private area.  Please call patient at [phone]. Thanks!

## 2017-06-02 ENCOUNTER — OFFICE VISIT (OUTPATIENT)
Dept: FAMILY MEDICINE | Facility: CLINIC | Age: 82
End: 2017-06-02
Payer: MEDICARE

## 2017-06-02 VITALS
TEMPERATURE: 98 F | WEIGHT: 106.06 LBS | HEART RATE: 101 BPM | HEIGHT: 66 IN | BODY MASS INDEX: 17.05 KG/M2 | DIASTOLIC BLOOD PRESSURE: 65 MMHG | SYSTOLIC BLOOD PRESSURE: 114 MMHG

## 2017-06-02 DIAGNOSIS — B37.31 YEAST VAGINITIS: Primary | ICD-10-CM

## 2017-06-02 PROCEDURE — 99213 OFFICE O/P EST LOW 20 MIN: CPT | Mod: PBBFAC,PO | Performed by: PHYSICIAN ASSISTANT

## 2017-06-02 PROCEDURE — 99213 OFFICE O/P EST LOW 20 MIN: CPT | Mod: S$PBB,,, | Performed by: PHYSICIAN ASSISTANT

## 2017-06-02 PROCEDURE — 99999 PR PBB SHADOW E&M-EST. PATIENT-LVL III: CPT | Mod: PBBFAC,,, | Performed by: PHYSICIAN ASSISTANT

## 2017-06-02 RX ORDER — NYSTATIN AND TRIAMCINOLONE ACETONIDE 100000; 1 [USP'U]/G; MG/G
CREAM TOPICAL
Refills: 0 | COMMUNITY
Start: 2017-05-09 | End: 2018-06-12 | Stop reason: ALTCHOICE

## 2017-06-02 RX ORDER — FLUCONAZOLE 150 MG/1
TABLET ORAL
Qty: 2 TABLET | Refills: 0 | Status: SHIPPED | OUTPATIENT
Start: 2017-06-02 | End: 2017-08-09 | Stop reason: SDUPTHER

## 2017-06-02 NOTE — PROGRESS NOTES
Subjective:       Patient ID: Melody Dupont is a 89 y.o. female.    Chief Complaint: Vaginal Itching    Patient presents for evaluation of vaginal itching for 2-3 weeks.  She denies burning with urination, frequent urination, pelvic pain.  She has tried Vaseline without relief.        Vaginal Itching   The patient's pertinent negatives include no pelvic pain or vaginal discharge. Pertinent negatives include no dysuria, flank pain, frequency, hematuria or urgency.     Review of Systems   Genitourinary: Negative for decreased urine volume, difficulty urinating, dysuria, enuresis, flank pain, frequency, genital sores, hematuria, pelvic pain, urgency, vaginal bleeding, vaginal discharge and vaginal pain.       Objective:      Physical Exam   Constitutional: She appears cachectic. She is cooperative. She does not appear ill. No distress.   Genitourinary: There is rash on the right labia. There is rash on the left labia.   Genitourinary Comments: Erythema to labial folds and labia minora        Neurological: She is alert.   Vitals reviewed.      Assessment:       1. Yeast vaginitis        Plan:       Melody was seen today for vaginal itching.    Diagnoses and all orders for this visit:    Yeast vaginitis  -     fluconazole (DIFLUCAN) 150 MG Tab; Take one today and repeat in one week if needed

## 2017-06-02 NOTE — PATIENT INSTRUCTIONS
Vaginal Infection: Yeast (Candidiasis)  Yeast infection occurs when yeast in the vagina increase and start attacking the vaginal tissues. Yeast is a type of fungus. These infections are often caused by a type of yeast called Candida albicans. Other species of yeast can also cause infections. Factors that may make infection more likely include recent antibiotic use, douching, or increased sex. Yeast infections are more common in women who have diabetes, or are obese or pregnant, or have a weak immune system.  Symptoms of yeast infection  · Clumpy or thin, white discharge, which may look like cottage cheese  · No odor or minimal odor  · Severe vaginal itching or burning  · Burning with urination  · Swelling, redness of vulva  · Pain during sex  Treating yeast infection  Yeast infection is treated with a vaginal antifungal cream. In some cases, antifungal pills are prescribed instead. During treatment:  · Finish all of your medicine, even if your symptoms go away.  · Apply the cream before going to bed. Lie flat after applying so that it doesn't drip out.  · Do not douche or use tampons.  · Don't rely on a diaphragm or condoms, since the cream may weaken them.  · Avoid intercourse if advised by your healthcare provider.     Should I treat a yeast infection myself?  Discuss with your healthcare provider whether you should use over-the-counter medicines to treat a yeast infection. Self-treatment may depend on whether:  · You've had a yeast infection in the past.  · You're at risk for STDs.  Call your healthcare provider if symptoms do not go away or come back after treatment.   Date Last Reviewed: 5/12/2015  © 0992-5535 [employer]. [address] [place] [address], [place] [address]. All rights reserved. This information is not intended as a substitute for professional medical care. Always follow your healthcare professional's instructions.

## 2017-06-08 ENCOUNTER — HOSPITAL ENCOUNTER (OUTPATIENT)
Dept: RADIOLOGY | Facility: HOSPITAL | Age: 82
Discharge: HOME | End: 2017-06-08
Attending: INTERNAL MEDICINE
Payer: MEDICARE

## 2017-06-08 ENCOUNTER — TELEPHONE (OUTPATIENT)
Dept: CARDIOLOGY | Facility: CLINIC | Age: 82
End: 2017-06-08

## 2017-06-08 DIAGNOSIS — R09.89 BILATERAL CAROTID BRUITS: ICD-10-CM

## 2017-06-08 DIAGNOSIS — Z86.73 HISTORY OF TIA (TRANSIENT ISCHEMIC ATTACK): ICD-10-CM

## 2017-06-08 DIAGNOSIS — R13.10 DYSPHAGIA, UNSPECIFIED TYPE: ICD-10-CM

## 2017-06-08 LAB — INTERNAL CAROTID STENOSIS: NORMAL

## 2017-06-08 PROCEDURE — 74220 X-RAY XM ESOPHAGUS 1CNTRST: CPT | Mod: TC

## 2017-06-08 PROCEDURE — 93880 EXTRACRANIAL BILAT STUDY: CPT | Mod: TC

## 2017-06-08 PROCEDURE — 93880 EXTRACRANIAL BILAT STUDY: CPT | Mod: 26,,, | Performed by: INTERNAL MEDICINE

## 2017-06-08 PROCEDURE — 74220 X-RAY XM ESOPHAGUS 1CNTRST: CPT | Mod: 26,,, | Performed by: RADIOLOGY

## 2017-06-08 NOTE — TELEPHONE ENCOUNTER
Spoke to patient gave her information from Dr Cristina that: Please call patient with results, no significant blockages in the Carotid.  Patient does not have [facility].  Patient has a one year follow up, if you need anything please do not hesitate to call office. Patient stated that she understands and agrees..

## 2017-06-13 ENCOUNTER — TELEPHONE (OUTPATIENT)
Dept: RHEUMATOLOGY | Facility: CLINIC | Age: 82
End: 2017-06-13

## 2017-06-13 NOTE — TELEPHONE ENCOUNTER
----- Message from Marcella Ji sent at 6/13/2017  4:06 PM CDT -----  Patient would like to change her appointments to later in the day due to transportation.     Please call patient back at [phone]    Thank you

## 2017-06-14 ENCOUNTER — TELEPHONE (OUTPATIENT)
Dept: GASTROENTEROLOGY | Facility: CLINIC | Age: 82
End: 2017-06-14

## 2017-06-14 NOTE — TELEPHONE ENCOUNTER
----- Message from Natalia Miranda sent at 6/14/2017  8:04 AM CDT -----  Patient would like to know if she does have a hiatal hernia. Please call to advise because she does not what to do. Please call at [phone].

## 2017-06-15 ENCOUNTER — TELEPHONE (OUTPATIENT)
Dept: FAMILY MEDICINE | Facility: CLINIC | Age: 82
End: 2017-06-15

## 2017-06-15 DIAGNOSIS — N76.0 ACUTE VAGINITIS: Primary | ICD-10-CM

## 2017-06-15 NOTE — TELEPHONE ENCOUNTER
Spoke with patient she states she is still having discharge. She has completed both courses of the Diflucan. She wants to know does she need to come in for a follow-up office visit.       ----- Message from Radha Williamson sent at 6/15/2017  8:26 AM CDT -----  Contact: self  States she was seen 2 weeks ago and her yeast infection has not cleared up.  Please call back at [phone] (home)

## 2017-06-16 NOTE — TELEPHONE ENCOUNTER
Refer to gynecology for further evaluation  When she was seen last she was having itching.  Is she having any other symptoms?

## 2017-06-26 DIAGNOSIS — K21.9 GASTROESOPHAGEAL REFLUX DISEASE, ESOPHAGITIS PRESENCE NOT SPECIFIED: ICD-10-CM

## 2017-06-26 DIAGNOSIS — K25.9 GASTRIC ULCER, UNSPECIFIED CHRONICITY, UNSPECIFIED WHETHER GASTRIC ULCER HEMORRHAGE OR PERFORATION PRESENT: Primary | ICD-10-CM

## 2017-06-27 ENCOUNTER — ANESTHESIA EVENT (OUTPATIENT)
Dept: ENDOSCOPY | Facility: HOSPITAL | Age: 82
End: 2017-06-27
Payer: MEDICARE

## 2017-06-27 ENCOUNTER — HOSPITAL ENCOUNTER (OUTPATIENT)
Facility: HOSPITAL | Age: 82
Discharge: HOME | End: 2017-06-27
Attending: INTERNAL MEDICINE | Admitting: INTERNAL MEDICINE
Payer: MEDICARE

## 2017-06-27 ENCOUNTER — TELEPHONE (OUTPATIENT)
Dept: FAMILY MEDICINE | Facility: CLINIC | Age: 82
End: 2017-06-27

## 2017-06-27 ENCOUNTER — ANESTHESIA (OUTPATIENT)
Dept: ENDOSCOPY | Facility: HOSPITAL | Age: 82
End: 2017-06-27
Payer: MEDICARE

## 2017-06-27 VITALS — RESPIRATION RATE: 17 BRPM

## 2017-06-27 DIAGNOSIS — K21.9 GERD (GASTROESOPHAGEAL REFLUX DISEASE): ICD-10-CM

## 2017-06-27 DIAGNOSIS — N39.0 URINARY TRACT INFECTION WITHOUT HEMATURIA, SITE UNSPECIFIED: Primary | ICD-10-CM

## 2017-06-27 PROCEDURE — D9220A PRA ANESTHESIA: Mod: ANES,,, | Performed by: ANESTHESIOLOGY

## 2017-06-27 PROCEDURE — D9220A PRA ANESTHESIA: Mod: CRNA,,, | Performed by: NURSE ANESTHETIST, CERTIFIED REGISTERED

## 2017-06-27 PROCEDURE — 88305 TISSUE EXAM BY PATHOLOGIST: CPT | Performed by: PATHOLOGY

## 2017-06-27 PROCEDURE — 37000008 HC ANESTHESIA 1ST 15 MINUTES: Performed by: INTERNAL MEDICINE

## 2017-06-27 PROCEDURE — 63600175 PHARM REV CODE 636 W HCPCS: Performed by: NURSE ANESTHETIST, CERTIFIED REGISTERED

## 2017-06-27 PROCEDURE — 25000003 PHARM REV CODE 250: Performed by: INTERNAL MEDICINE

## 2017-06-27 PROCEDURE — 25000003 PHARM REV CODE 250: Performed by: NURSE ANESTHETIST, CERTIFIED REGISTERED

## 2017-06-27 PROCEDURE — 27201012 HC FORCEPS, HOT/COLD, DISP: Performed by: INTERNAL MEDICINE

## 2017-06-27 PROCEDURE — 43239 EGD BIOPSY SINGLE/MULTIPLE: CPT | Performed by: INTERNAL MEDICINE

## 2017-06-27 PROCEDURE — 43239 EGD BIOPSY SINGLE/MULTIPLE: CPT | Mod: ,,, | Performed by: INTERNAL MEDICINE

## 2017-06-27 RX ORDER — LIDOCAINE HYDROCHLORIDE 20 MG/ML
INJECTION, SOLUTION EPIDURAL; INFILTRATION; INTRACAUDAL; PERINEURAL
Status: DISCONTINUED
Start: 2017-06-27 | End: 2017-06-27 | Stop reason: HOSPADM

## 2017-06-27 RX ORDER — PROPOFOL 10 MG/ML
INJECTION, EMULSION INTRAVENOUS
Status: DISCONTINUED
Start: 2017-06-27 | End: 2017-06-27 | Stop reason: HOSPADM

## 2017-06-27 RX ORDER — LIDOCAINE HCL/PF 100 MG/5ML
SYRINGE (ML) INTRAVENOUS
Status: DISCONTINUED | OUTPATIENT
Start: 2017-06-27 | End: 2017-06-27

## 2017-06-27 RX ORDER — SODIUM CHLORIDE 9 MG/ML
INJECTION, SOLUTION INTRAVENOUS CONTINUOUS
Status: DISCONTINUED | OUTPATIENT
Start: 2017-06-27 | End: 2017-06-27 | Stop reason: HOSPADM

## 2017-06-27 RX ORDER — PROPOFOL 10 MG/ML
VIAL (ML) INTRAVENOUS
Status: DISCONTINUED | OUTPATIENT
Start: 2017-06-27 | End: 2017-06-27

## 2017-06-27 RX ADMIN — LIDOCAINE HYDROCHLORIDE 50 MG: 20 INJECTION, SOLUTION INTRAVENOUS at 12:06

## 2017-06-27 RX ADMIN — PROPOFOL 20 MG: 10 INJECTION, EMULSION INTRAVENOUS at 12:06

## 2017-06-27 RX ADMIN — PROPOFOL 70 MG: 10 INJECTION, EMULSION INTRAVENOUS at 12:06

## 2017-06-27 RX ADMIN — SODIUM CHLORIDE 1000 ML: 0.9 INJECTION, SOLUTION INTRAVENOUS at 11:06

## 2017-06-27 NOTE — TELEPHONE ENCOUNTER
----- Message from Angela Tolentino sent at 6/27/2017  8:01 AM CDT -----  Contact: self  Patient called asking for advice about itchy bottom that is persistent. Patient would like to take a urine test.  Please call patient at [phone]. Thanks!

## 2017-06-27 NOTE — TELEPHONE ENCOUNTER
----- Message from Angela Tolentino sent at 6/27/2017  8:01 AM CDT -----  Contact: self  Patient called asking for advice about itchy bottom that is persistent. Patient would like to take a urine test.  Please call patient at [phone]. Thanks!

## 2017-06-27 NOTE — TELEPHONE ENCOUNTER
----- Message from Katalina Gomez sent at 6/27/2017 12:34 PM CDT -----  Contact: Na Dela Cruz/[facility]  Na wants to let Dr. Loving know that pt's uti is continuing and has not gotten any better. Pls call pt to advise. Pt can be reached at [phone].

## 2017-06-27 NOTE — ANESTHESIA PREPROCEDURE EVALUATION
06/27/2017  Melody Dupont is a 89 y.o., female.    Anesthesia Evaluation    I have reviewed the Patient Summary Reports.    I have reviewed the Nursing Notes.   I have reviewed the Medications.     Review of Systems  Social:  Non-Smoker    Hematology/Oncology:  Hematology Normal   Oncology Normal     EENT/Dental:EENT/Dental Normal   Cardiovascular:   Hypertension    Pulmonary:  Pulmonary Normal    Renal/:   Chronic Renal Disease    Hepatic/GI:   PUD, GERD    Musculoskeletal:  Musculoskeletal Normal    Neurological:   TIA,    Endocrine:  Endocrine Normal    Dermatological:  Skin Normal    Psych:  Psychiatric Normal           Physical Exam  General:  Well nourished    Airway/Jaw/Neck:  AIRWAY FINDINGS: Normal      Eyes/Ears/Nose:  EYES/EARS/NOSE FINDINGS: Normal   Dental:  DENTAL FINDINGS: Normal   Chest/Lungs:  Chest/Lungs Findings: Clear to auscultation, Normal Respiratory Rate     Heart/Vascular:  Heart Findings: Rate: Normal  Rhythm: Regular Rhythm  Sounds: Normal  Heart Murmur  Systolic  Systolic Heart Murmur Description: L Upper Sternal Border  Systolic Heart Murmur Grade: Grade III  Vascular Findings: Normal    Abdomen:  Abdomen Findings: Normal    Musculoskeletal:  Musculoskeletal Findings: Normal   Skin:  Skin Findings: Normal    Mental Status:  Mental Status Findings: Normal        Anesthesia Plan  Type of Anesthesia, risks & benefits discussed:  Anesthesia Type:  general  Patient's Preference:   Intra-op Monitoring Plan: standard ASA monitors  Intra-op Monitoring Plan Comments:   Post Op Pain Control Plan:   Post Op Pain Control Plan Comments:   Induction:   IV  Beta Blocker:  Patient is not currently on a Beta-Blocker (No further documentation required).       Informed Consent: Patient understands risks and agrees with Anesthesia plan.  Questions answered. Anesthesia consent signed with  patient.  ASA Score: 3     Day of Surgery Review of History & Physical:    H&P update referred to the provider.         Ready For Surgery From Anesthesia Perspective.

## 2017-06-27 NOTE — TRANSFER OF CARE
"Anesthesia Transfer of Care Note    Patient: Melody Dupont    Procedure(s) Performed: Procedure(s) (LRB):  ESOPHAGOGASTRODUODENOSCOPY (EGD) (N/A)    Patient location: PACU    Anesthesia Type: general    Transport from OR: Transported from OR on room air with adequate spontaneous ventilation    Post pain: adequate analgesia    Post assessment: no apparent anesthetic complications    Post vital signs: stable    Level of consciousness: awake    Nausea/Vomiting: no nausea/vomiting    Complications: none    Transfer of care protocol was followed      Last vitals:   Visit Vitals  BP (!) 148/65   Pulse 72   Temp 36.7 °C (98.1 °F) (Oral)   Resp 18   Ht 5' 5.5" (1.664 m)   Wt 48.1 kg (106 lb)   SpO2 100%   Breastfeeding? No   BMI 17.37 kg/m²     "

## 2017-06-27 NOTE — DISCHARGE INSTRUCTIONS
What Is a Hiatal Hernia?    Hiatal hernia is when the area where the stomach and esophagus meet bulges up through the diaphragm into the chest cavity. In some cases, part of the stomach may bulge above the diaphragm. Stomach acid may move up into the esophagus and cause symptoms. The symptoms are often blamed on gastroesophageal reflux disease (GERD). You may only know about the hernia when it shows up on an X-ray taken for other reasons.   What you may feel  The hiatus is a normal hole in the diaphragm. The esophagus passes through this hole and leads to the stomach. In some cases, part of the stomach may bulge above the diaphragm. This bulge is called a hernia. Stomach acid may move up into the esophagus and cause symptoms.  When you eat, the muscle at the hiatus relaxes to allow food to pass into the stomach. It tightens again to keep food and digestive acids in the stomach.  Many people with hiatal hernias have mild symptoms. You may notice the following GERD symptoms:  · Heartburn or other chest discomfort  · A feeling of chest fullness after a meal  · Frequent burping  · Acid taste in the mouth  · Trouble swallowing  Treating symptoms  If you have been diagnosed with hiatal hernia, these suggestions may help improve symptoms:  · Lose excess weight. Extra weight puts pressure on the stomach and esophagus.  · Dont lie down after eating. Sit up for at least an hour after eating. Lying down after eating can increase symptoms.  · Avoid certain foods and drinks. These include fatty foods, chocolate, coffee, mint, and other foods that cause symptoms for you.  · Dont smoke or drink alcohol. These can worsen symptoms.  · Look at your medicines. Discuss your medicines with your healthcare provider. Many medicines can cause symptoms.  · Consider an antacid medicine. Ask your healthcare provider about over-the-counter and prescription medicines that may help.  · Ask about surgery, if needed. Surgery is a treatment  choice for some people. Your healthcare provider can determine if surgery is an option for you.    Date Last Reviewed: 10/1/2016  © 2100-9543 [employer] [address] [place] [address], [place] [address]. All rights reserved. This information is not intended as a substitute for professional medical care. Always follow your healthcare professional's instructions.

## 2017-06-28 ENCOUNTER — LAB VISIT (OUTPATIENT)
Dept: LAB | Facility: HOSPITAL | Age: 82
End: 2017-06-28
Attending: FAMILY MEDICINE
Payer: MEDICARE

## 2017-06-28 VITALS
SYSTOLIC BLOOD PRESSURE: 136 MMHG | WEIGHT: 106 LBS | BODY MASS INDEX: 17.04 KG/M2 | OXYGEN SATURATION: 82 % | HEART RATE: 64 BPM | RESPIRATION RATE: 16 BRPM | DIASTOLIC BLOOD PRESSURE: 63 MMHG | HEIGHT: 66 IN | TEMPERATURE: 97 F

## 2017-06-28 DIAGNOSIS — N39.0 URINARY TRACT INFECTION WITHOUT HEMATURIA, SITE UNSPECIFIED: Primary | ICD-10-CM

## 2017-06-28 DIAGNOSIS — N39.0 URINARY TRACT INFECTION WITHOUT HEMATURIA, SITE UNSPECIFIED: ICD-10-CM

## 2017-06-28 LAB
BACTERIA #/AREA URNS AUTO: ABNORMAL /HPF
BILIRUB UR QL STRIP: NEGATIVE
CLARITY UR REFRACT.AUTO: CLEAR
COLOR UR AUTO: YELLOW
GLUCOSE UR QL STRIP: NEGATIVE
HGB UR QL STRIP: NEGATIVE
KETONES UR QL STRIP: NEGATIVE
LEUKOCYTE ESTERASE UR QL STRIP: ABNORMAL
MICROSCOPIC COMMENT: ABNORMAL
NITRITE UR QL STRIP: NEGATIVE
PH UR STRIP: 7 [PH] (ref 5–8)
PROT UR QL STRIP: NEGATIVE
RBC #/AREA URNS AUTO: 0 /HPF (ref 0–4)
SP GR UR STRIP: 1.01 (ref 1–1.03)
URN SPEC COLLECT METH UR: ABNORMAL
UROBILINOGEN UR STRIP-ACNC: NEGATIVE EU/DL
WBC #/AREA URNS AUTO: 15 /HPF (ref 0–5)

## 2017-06-28 PROCEDURE — 87077 CULTURE AEROBIC IDENTIFY: CPT

## 2017-06-28 PROCEDURE — 87088 URINE BACTERIA CULTURE: CPT

## 2017-06-28 PROCEDURE — 81001 URINALYSIS AUTO W/SCOPE: CPT

## 2017-06-28 PROCEDURE — 87086 URINE CULTURE/COLONY COUNT: CPT

## 2017-06-28 PROCEDURE — 87186 SC STD MICRODIL/AGAR DIL: CPT

## 2017-06-28 NOTE — TELEPHONE ENCOUNTER
----- Message from Eden Bray sent at 6/28/2017  7:58 AM CDT -----  Contact: pt   Wants order for UA   UTI SX   Call back

## 2017-06-28 NOTE — H&P
"Endo HP    Patient here for EGD to evaluate healing of gastric ulcer.  No new complaints.    Past Medical History:   Diagnosis Date    Anemia     Cataract     Disorder of kidney and ureter     Hyperlipidemia     Hypertension     Malnutrition     Osteoporosis     PUD (peptic ulcer disease)     Restless leg syndrome     Rheumatoid arthritis     TIA (transient ischemic attack) 1/23/12       /63   Pulse 64   Temp 97.2 °F (36.2 °C) (Temporal)   Resp 16   Ht 5' 5.5" (1.664 m)   Wt 48.1 kg (106 lb)   SpO2 (!) 82%   Breastfeeding? No   BMI 17.37 kg/m²   Abdomen soft NT ND  Lungs clear  RRR    A/P  89 yo female with a previous gastric ulcer - here for EGD to evaluate healing    - EGD today  "

## 2017-06-30 ENCOUNTER — TELEPHONE (OUTPATIENT)
Dept: FAMILY MEDICINE | Facility: CLINIC | Age: 82
End: 2017-06-30

## 2017-06-30 NOTE — TELEPHONE ENCOUNTER
----- Message from Lady Loving MD sent at 6/29/2017 10:15 AM CDT -----  Notify patient: The urinalysis is relatively normal.  I would like to run the urine culture to verify if infection present before prescribing more antibiotic.  It may be overactive bladder or a yeast infection causing symptoms.  I recommend a trial of oTC anti-yeast cream like monostat.  If no infection and symptoms persist then make a follow up appointment

## 2017-06-30 NOTE — TELEPHONE ENCOUNTER
Advised patient of UA results. Also, that Dr. Loving would like her to try Monistat. She said she had taken diflucan with no resolution. I advised that sometimes the medication needs to go where the issue is. She understood. I also let her know that if this does not work she will need to come in.

## 2017-07-01 LAB — BACTERIA UR CULT: NORMAL

## 2017-07-03 DIAGNOSIS — N39.0 URINARY TRACT INFECTION WITHOUT HEMATURIA, SITE UNSPECIFIED: Primary | ICD-10-CM

## 2017-07-03 RX ORDER — CIPROFLOXACIN 250 MG/1
250 TABLET, FILM COATED ORAL 2 TIMES DAILY
Qty: 14 TABLET | Refills: 0 | Status: SHIPPED | OUTPATIENT
Start: 2017-07-03 | End: 2017-08-14

## 2017-07-05 ENCOUNTER — TELEPHONE (OUTPATIENT)
Dept: CARDIOLOGY | Facility: CLINIC | Age: 82
End: 2017-07-05

## 2017-07-05 NOTE — TELEPHONE ENCOUNTER
----- Message from Eleuterio Cristina MD sent at 7/5/2017  3:58 PM CDT -----  CONCLUSIONS   There is 0 - 19% right Internal Carotid stenosis.  There is 0 - 19% left Internal Carotid stenosis.    Homogenous plaques in the ICAs, antegrade flows in the vertebral arteries.    Please review with patient, only minimal diseases. No [facility], thanks, Dr. Cristina

## 2017-07-05 NOTE — TELEPHONE ENCOUNTER
Spoke to patient gave information from Eleuterio Nino : Please review with patient, only minimal diseases. No [facility], thanks, Dr. Cristina  Patient stated that she understands and agrees  . Patient will follow up in 04/2018 with Dr Cristina.

## 2017-08-09 ENCOUNTER — TELEPHONE (OUTPATIENT)
Dept: FAMILY MEDICINE | Facility: CLINIC | Age: 82
End: 2017-08-09

## 2017-08-09 RX ORDER — FLUCONAZOLE 150 MG/1
TABLET ORAL
Qty: 2 TABLET | Refills: 0 | Status: SHIPPED | OUTPATIENT
Start: 2017-08-09 | End: 2017-08-14

## 2017-08-09 NOTE — TELEPHONE ENCOUNTER
Spoke to patient. States she has been having vaginal itching for a week. Has tried OTC monistat without relief. Itching is external. Patient denies discharge, visible rash and odor. She is afebrile. Requesting diflucan be sent to pharmacy. Please advise.

## 2017-08-09 NOTE — TELEPHONE ENCOUNTER
----- Message from Alexandra Aragon LPN sent at 8/9/2017 10:35 AM CDT -----  Contact: Patient      ----- Message -----  From: Vanita Hagen  Sent: 8/9/2017  10:33 AM  To: Shoaib Best Staff    Melody, patient [phone], calling about vaginal itching and would like a Rx. Please call and advise. Thanks.      [facility]/pharmacy #[id number] - [place] - [address]  [address] [address]  Phone: [phone] Fax: [phone]

## 2017-08-11 ENCOUNTER — DOCUMENTATION ONLY (OUTPATIENT)
Dept: FAMILY MEDICINE | Facility: CLINIC | Age: 82
End: 2017-08-11

## 2017-08-11 NOTE — PROGRESS NOTES
Pre-Visit Chart Review  For Appointment Scheduled on 8-14-17    Health Maintenance Due   Topic Date Due    Lipid Panel  04/06/2017    Influenza Vaccine  08/01/2017

## 2017-08-14 ENCOUNTER — OFFICE VISIT (OUTPATIENT)
Dept: FAMILY MEDICINE | Facility: CLINIC | Age: 82
End: 2017-08-14
Payer: MEDICARE

## 2017-08-14 VITALS
DIASTOLIC BLOOD PRESSURE: 59 MMHG | SYSTOLIC BLOOD PRESSURE: 99 MMHG | BODY MASS INDEX: 18.67 KG/M2 | TEMPERATURE: 98 F | WEIGHT: 116.19 LBS | HEART RATE: 96 BPM | HEIGHT: 66 IN

## 2017-08-14 DIAGNOSIS — E46 MALNUTRITION: ICD-10-CM

## 2017-08-14 DIAGNOSIS — I77.9 CAROTID DISEASE, BILATERAL: ICD-10-CM

## 2017-08-14 DIAGNOSIS — D69.6 THROMBOCYTOPENIA: ICD-10-CM

## 2017-08-14 DIAGNOSIS — N18.30 CHRONIC KIDNEY DISEASE, STAGE III (MODERATE): ICD-10-CM

## 2017-08-14 DIAGNOSIS — I10 ESSENTIAL HYPERTENSION: ICD-10-CM

## 2017-08-14 DIAGNOSIS — E55.9 HYPOVITAMINOSIS D: Primary | ICD-10-CM

## 2017-08-14 DIAGNOSIS — E78.2 MIXED HYPERLIPIDEMIA: ICD-10-CM

## 2017-08-14 DIAGNOSIS — D64.9 ANEMIA, UNSPECIFIED TYPE: ICD-10-CM

## 2017-08-14 DIAGNOSIS — E53.8 B12 DEFICIENCY: ICD-10-CM

## 2017-08-14 DIAGNOSIS — J30.1 ACUTE SEASONAL ALLERGIC RHINITIS DUE TO POLLEN: ICD-10-CM

## 2017-08-14 DIAGNOSIS — I70.0 ABDOMINAL AORTIC ATHEROSCLEROSIS: ICD-10-CM

## 2017-08-14 PROCEDURE — 99214 OFFICE O/P EST MOD 30 MIN: CPT | Mod: S$PBB,,, | Performed by: FAMILY MEDICINE

## 2017-08-14 PROCEDURE — 3008F BODY MASS INDEX DOCD: CPT | Mod: ,,, | Performed by: FAMILY MEDICINE

## 2017-08-14 PROCEDURE — 99213 OFFICE O/P EST LOW 20 MIN: CPT | Mod: PBBFAC,PO | Performed by: FAMILY MEDICINE

## 2017-08-14 PROCEDURE — 1159F MED LIST DOCD IN RCRD: CPT | Mod: ,,, | Performed by: FAMILY MEDICINE

## 2017-08-14 PROCEDURE — 1126F AMNT PAIN NOTED NONE PRSNT: CPT | Mod: ,,, | Performed by: FAMILY MEDICINE

## 2017-08-14 PROCEDURE — 99499 UNLISTED E&M SERVICE: CPT | Mod: S$PBB,,, | Performed by: FAMILY MEDICINE

## 2017-08-14 PROCEDURE — 99999 PR PBB SHADOW E&M-EST. PATIENT-LVL III: CPT | Mod: PBBFAC,,, | Performed by: FAMILY MEDICINE

## 2017-08-14 RX ORDER — ERGOCALCIFEROL 1.25 MG/1
50000 CAPSULE ORAL
COMMUNITY
End: 2017-08-14 | Stop reason: SDUPTHER

## 2017-08-14 RX ORDER — FLUTICASONE PROPIONATE 50 MCG
1 SPRAY, SUSPENSION (ML) NASAL DAILY
Qty: 1 BOTTLE | Status: SHIPPED | OUTPATIENT
Start: 2017-08-14 | End: 2017-09-29 | Stop reason: SDUPTHER

## 2017-08-14 RX ORDER — ERGOCALCIFEROL 1.25 MG/1
50000 CAPSULE ORAL
Qty: 12 CAPSULE | Refills: 1 | Status: SHIPPED | OUTPATIENT
Start: 2017-08-14 | End: 2017-11-27 | Stop reason: SDUPTHER

## 2017-08-14 NOTE — PROGRESS NOTES
Subjective:       Patient ID: Melody Dupont is a 89 y.o. female.    Chief Complaint: Follow-up    Patient Active Problem List   Diagnosis    History of TIA (transient ischemic attack)    HTN (hypertension), onset 2014    Mixed hyperlipidemia, baseline LDL not available    Gastroesophageal reflux disease without esophagitis    Rheumatoid arthritis involving multiple sites with positive rheumatoid factor, onset 1972    BMI less than 19,adult    Underweight    Chronic kidney disease, stage III (moderate)    Carotid disease, bilateral    Abdominal aortic atherosclerosis    Malnutrition    Thrombocytopenia    Anemia    Hypovitaminosis D    Encounter for monitoring leflunomide therapy    Osteoporosis    Nonrheumatic aortic valve stenosis    RLS (restless legs syndrome)    Polycystic kidney disease    Excessive daytime sleepiness    Bilateral carotid bruits    Dysphagia    GERD (gastroesophageal reflux disease)     Lab Results   Component Value Date    WBC 7.80 03/27/2017    HGB 11.1 (L) 03/27/2017    HCT 35.1 (L) 03/27/2017     (L) 03/27/2017    CHOL 177 04/06/2016    TRIG 68 04/06/2016    HDL 62 04/06/2016    ALT 16 03/27/2017    ALT 16 03/27/2017    AST 20 03/27/2017    AST 20 03/27/2017     03/27/2017     03/27/2017    K 4.9 03/27/2017    K 4.9 03/27/2017     03/27/2017     03/27/2017    CREATININE 1.2 03/27/2017    CREATININE 1.2 03/27/2017    BUN 29 (H) 03/27/2017    BUN 29 (H) 03/27/2017    CO2 25 03/27/2017    CO2 25 03/27/2017    TSH 2.366 04/06/2016   egd 6/17-medium hiatal hernia, gastritis, gastric ulcer now healed     C/o PNd, clearing throat vivienne when lying down, sneezing    RA- no longer seeing any rheum.  Couldn't afford medications and feels she is doing fine so not interested in seeing anyone  HPI  Review of Systems   Constitutional: Negative for chills, fatigue and fever.   HENT: Positive for congestion, postnasal drip, rhinorrhea, sneezing and  sore throat. Negative for ear discharge, ear pain and sinus pressure.    Respiratory: Negative for cough, shortness of breath and wheezing.        Objective:      Physical Exam   Constitutional: She appears well-developed and well-nourished.   HENT:   Right Ear: Tympanic membrane and ear canal normal.   Left Ear: Tympanic membrane and ear canal normal.   Nose: Mucosal edema and rhinorrhea present. Right sinus exhibits no maxillary sinus tenderness and no frontal sinus tenderness. Left sinus exhibits no maxillary sinus tenderness and no frontal sinus tenderness.   Mouth/Throat: Posterior oropharyngeal erythema present. No oropharyngeal exudate, posterior oropharyngeal edema or tonsillar abscesses.   Cardiovascular: Normal rate, regular rhythm and normal heart sounds.    Pulmonary/Chest: Effort normal and breath sounds normal.   Skin: Skin is warm and dry.   Psychiatric: She has a normal mood and affect.   Nursing note and vitals reviewed.      Assessment:       1. Hypovitaminosis D    2. Anemia, unspecified type    3. Chronic kidney disease, stage III (moderate)    4. Mixed hyperlipidemia, baseline LDL not available    5. Essential hypertension    6. B12 deficiency    7. Acute seasonal allergic rhinitis due to pollen    8. Carotid disease, bilateral    9. Abdominal aortic atherosclerosis    10. Thrombocytopenia    11. BMI less than 19,adult    12. Malnutrition        Plan:       1. Hypovitaminosis D  Stable condition.  Continue current medications.  Will adjust based on lab findings or if condition changes.    - ergocalciferol (VITAMIN D2) 50,000 unit Cap; Take 1 capsule (50,000 Units total) by mouth every 7 days.  Dispense: 12 capsule; Refill: 1  - Vitamin D; Future    2. Anemia, unspecified type  Screen and treat as indicated:    - CBC auto differential; Future  - Ferritin; Future  - Iron and TIBC; Future    3. Chronic kidney disease, stage III (moderate)  Stable and chronic.  Will continue to monitor q3-6 months  and control chronic conditions as optimally as possible to preserve function.      4. Mixed hyperlipidemia, baseline LDL not available  Stable condition.  Continue current medications.  Will adjust based on lab findings or if condition changes.    - Comprehensive metabolic panel; Future  - Lipid panel; Future    5. Essential hypertension  Controlled on current medications.  Continue current medications.      6. B12 deficiency  Screen and treat as indicated:    - Vitamin B12; Future    7. Acute seasonal allergic rhinitis due to pollen  Recommend otc non-sedating antihistamine such as Loratadine and/or steroid nasal spray such as Flonase as directed and as needed.  Please return to clinic if symptoms persist after these interventions.    - fluticasone (FLONASE) 50 mcg/actuation nasal spray; 1 spray by Each Nare route once daily.  Dispense: 1 Bottle; Refill: prn    8. Carotid disease, bilateral  Last u/s 7/17 -not significant    9. Abdominal aortic atherosclerosis  Cont to monitor periodically    10. Thrombocytopenia  Screen and treat as indicated:      11. BMI less than 19,adult  improving    12. Malnutrition  Improving.  Cont nutritional supplementation and monitoring      [facility] goal documentation:  Patient readiness: acceptance and barriers:readiness  During the course of the visit the patient was educated and counseled about the following: Hypertension:   Dietary sodium restriction.  Regular aerobic exercise.  Underweight:  Nutritional supplement  Goals: Hypertension: Reduce Blood Pressure and Underweight: Increase calorie intake and BMI    Goal/Outcomes met:Hypertension and low BMI  The following self management tools provided:none  Patient Instructions (the written plan) was given to the patient/family: Yes  Time spent with patient: 20 minutes    Patient with be reevaluated in 3 months or sooner prn    Greater than 50% of this visit was spent counseling as described in above documentation:Yes

## 2017-08-15 ENCOUNTER — LAB VISIT (OUTPATIENT)
Dept: LAB | Facility: HOSPITAL | Age: 82
End: 2017-08-15
Attending: FAMILY MEDICINE
Payer: MEDICARE

## 2017-08-15 DIAGNOSIS — D64.9 ANEMIA, UNSPECIFIED TYPE: ICD-10-CM

## 2017-08-15 DIAGNOSIS — E53.8 B12 DEFICIENCY: ICD-10-CM

## 2017-08-15 DIAGNOSIS — E55.9 HYPOVITAMINOSIS D: ICD-10-CM

## 2017-08-15 DIAGNOSIS — E78.2 MIXED HYPERLIPIDEMIA: ICD-10-CM

## 2017-08-15 LAB
25(OH)D3+25(OH)D2 SERPL-MCNC: 41 NG/ML
ALBUMIN SERPL BCP-MCNC: 3.3 G/DL
ALP SERPL-CCNC: 57 U/L
ALT SERPL W/O P-5'-P-CCNC: 8 U/L
ANION GAP SERPL CALC-SCNC: 7 MMOL/L
AST SERPL-CCNC: 17 U/L
BASOPHILS # BLD AUTO: 0.04 K/UL
BASOPHILS NFR BLD: 0.9 %
BILIRUB SERPL-MCNC: 0.3 MG/DL
BUN SERPL-MCNC: 24 MG/DL
CALCIUM SERPL-MCNC: 9.4 MG/DL
CHLORIDE SERPL-SCNC: 110 MMOL/L
CHOLEST/HDLC SERPL: 3.1 {RATIO}
CO2 SERPL-SCNC: 23 MMOL/L
CREAT SERPL-MCNC: 1.2 MG/DL
DIFFERENTIAL METHOD: ABNORMAL
EOSINOPHIL # BLD AUTO: 0.2 K/UL
EOSINOPHIL NFR BLD: 3.9 %
ERYTHROCYTE [DISTWIDTH] IN BLOOD BY AUTOMATED COUNT: 15.8 %
EST. GFR  (AFRICAN AMERICAN): 46.3 ML/MIN/1.73 M^2
EST. GFR  (NON AFRICAN AMERICAN): 40.2 ML/MIN/1.73 M^2
FERRITIN SERPL-MCNC: 42 NG/ML
GLUCOSE SERPL-MCNC: 94 MG/DL
HCT VFR BLD AUTO: 32 %
HDL/CHOLESTEROL RATIO: 32.1 %
HDLC SERPL-MCNC: 159 MG/DL
HDLC SERPL-MCNC: 51 MG/DL
HGB BLD-MCNC: 10.2 G/DL
IRON SERPL-MCNC: 39 UG/DL
LDLC SERPL CALC-MCNC: 93.2 MG/DL
LYMPHOCYTES # BLD AUTO: 1.2 K/UL
LYMPHOCYTES NFR BLD: 26.5 %
MCH RBC QN AUTO: 27.9 PG
MCHC RBC AUTO-ENTMCNC: 31.9 G/DL
MCV RBC AUTO: 87 FL
MONOCYTES # BLD AUTO: 0.5 K/UL
MONOCYTES NFR BLD: 10.1 %
NEUTROPHILS # BLD AUTO: 2.7 K/UL
NEUTROPHILS NFR BLD: 58.4 %
NONHDLC SERPL-MCNC: 108 MG/DL
PLATELET # BLD AUTO: 186 K/UL
PMV BLD AUTO: 12.4 FL
POTASSIUM SERPL-SCNC: 4.4 MMOL/L
PROT SERPL-MCNC: 6.4 G/DL
RBC # BLD AUTO: 3.66 M/UL
SATURATED IRON: 9 %
SODIUM SERPL-SCNC: 140 MMOL/L
TOTAL IRON BINDING CAPACITY: 413 UG/DL
TRANSFERRIN SERPL-MCNC: 279 MG/DL
TRIGL SERPL-MCNC: 74 MG/DL
VIT B12 SERPL-MCNC: 1201 PG/ML
WBC # BLD AUTO: 4.65 K/UL

## 2017-08-15 PROCEDURE — 82607 VITAMIN B-12: CPT

## 2017-08-15 PROCEDURE — 80061 LIPID PANEL: CPT

## 2017-08-15 PROCEDURE — 85025 COMPLETE CBC W/AUTO DIFF WBC: CPT

## 2017-08-15 PROCEDURE — 36415 COLL VENOUS BLD VENIPUNCTURE: CPT | Mod: PO

## 2017-08-15 PROCEDURE — 82306 VITAMIN D 25 HYDROXY: CPT

## 2017-08-15 PROCEDURE — 82728 ASSAY OF FERRITIN: CPT

## 2017-08-15 PROCEDURE — 83540 ASSAY OF IRON: CPT

## 2017-08-15 PROCEDURE — 80053 COMPREHEN METABOLIC PANEL: CPT

## 2017-08-29 ENCOUNTER — TELEPHONE (OUTPATIENT)
Dept: FAMILY MEDICINE | Facility: CLINIC | Age: 82
End: 2017-08-29

## 2017-08-29 RX ORDER — ASPIRIN 325 MG
1 TABLET, DELAYED RELEASE (ENTERIC COATED) ORAL NIGHTLY
Qty: 45 G | Refills: 0 | Status: SHIPPED | OUTPATIENT
Start: 2017-08-29 | End: 2017-09-29 | Stop reason: SDUPTHER

## 2017-08-29 NOTE — TELEPHONE ENCOUNTER
----- Message from Sharri Seymour LPN sent at 8/28/2017  8:54 AM CDT -----  Contact: self  Patient was seen on 8/14/17, not sure if you want her to come back in for these medications?  ----- Message -----  From: Radha Williamson  Sent: 8/25/2017   7:58 AM  To: Inder NEWSOME Staff    Needs new prescription for Lomotil.  Has a vaginal itch, has been using over the counter meds and it's not working.  Please call back at [phone] (home)     [id number] - [place] - [address]  [address] [address]  Phone: [phone] Fax: [phone]

## 2017-09-08 ENCOUNTER — TELEPHONE (OUTPATIENT)
Dept: FAMILY MEDICINE | Facility: CLINIC | Age: 82
End: 2017-09-08

## 2017-09-08 NOTE — TELEPHONE ENCOUNTER
Patient still complains of diarrhea ;has not gone to  anything otc has of yet ;patient states will try the imodium and if it does not work needs to come in for an appointment ;this has been going on for over a week ; still complains of vaginal itching but not as bad this has been going on since first week of august.Pls advise .

## 2017-09-08 NOTE — TELEPHONE ENCOUNTER
I do recommend an appointment to discuss ongoing diarrhea issues if OTC has not helped.  May need stool studies.  OK to book with me or YSABEL

## 2017-09-08 NOTE — TELEPHONE ENCOUNTER
----- Message from Gloria Aragon sent at 9/8/2017  8:11 AM CDT -----  Contact: self-  [phone]  Patient called asking for a new rx for diarrhea. [facility]  [phone].Thanks!

## 2017-09-08 NOTE — TELEPHONE ENCOUNTER
Spoke to patient ;continues with diarrhea ; still has not gotten anything otc ; seems to have trouble getting medication ; told patient to call back and make an appointment if continues with the diarrhea .

## 2017-09-08 NOTE — TELEPHONE ENCOUNTER
----- Message from Gloria Aragon sent at 9/8/2017  8:11 AM CDT -----  Contact: self-  [phone]  Patient called asking for a new rx for diarrhea. [facility]  [phone].Thanks!

## 2017-09-29 DIAGNOSIS — J30.1 ACUTE SEASONAL ALLERGIC RHINITIS DUE TO POLLEN: ICD-10-CM

## 2017-10-02 RX ORDER — FLUTICASONE PROPIONATE 50 MCG
1 SPRAY, SUSPENSION (ML) NASAL DAILY
Qty: 1 BOTTLE | Status: SHIPPED | OUTPATIENT
Start: 2017-10-02 | End: 2017-11-27 | Stop reason: SDUPTHER

## 2017-10-02 RX ORDER — ASPIRIN 325 MG
1 TABLET, DELAYED RELEASE (ENTERIC COATED) ORAL NIGHTLY
Qty: 45 G | Refills: 0 | Status: SHIPPED | OUTPATIENT
Start: 2017-10-02 | End: 2017-10-17 | Stop reason: SDUPTHER

## 2017-10-17 RX ORDER — ASPIRIN 325 MG
TABLET, DELAYED RELEASE (ENTERIC COATED) ORAL
Qty: 45 G | Refills: 0 | Status: SHIPPED | OUTPATIENT
Start: 2017-10-17 | End: 2018-06-12 | Stop reason: ALTCHOICE

## 2017-11-14 DIAGNOSIS — M89.8X5 PAIN IN FEMUR: Primary | ICD-10-CM

## 2017-11-16 ENCOUNTER — OFFICE VISIT (OUTPATIENT)
Dept: ORTHOPEDICS | Facility: CLINIC | Age: 82
End: 2017-11-16
Payer: MEDICARE

## 2017-11-16 ENCOUNTER — HOSPITAL ENCOUNTER (OUTPATIENT)
Dept: RADIOLOGY | Facility: HOSPITAL | Age: 82
Discharge: HOME | End: 2017-11-16
Attending: ORTHOPAEDIC SURGERY
Payer: MEDICARE

## 2017-11-16 VITALS
DIASTOLIC BLOOD PRESSURE: 66 MMHG | HEART RATE: 73 BPM | WEIGHT: 116 LBS | BODY MASS INDEX: 19.33 KG/M2 | SYSTOLIC BLOOD PRESSURE: 146 MMHG | HEIGHT: 65 IN

## 2017-11-16 DIAGNOSIS — M89.8X5 PAIN IN FEMUR: ICD-10-CM

## 2017-11-16 DIAGNOSIS — S76.011A HIP STRAIN, RIGHT, INITIAL ENCOUNTER: Primary | ICD-10-CM

## 2017-11-16 PROCEDURE — 99999 PR PBB SHADOW E&M-EST. PATIENT-LVL III: CPT | Mod: PBBFAC,,, | Performed by: ORTHOPAEDIC SURGERY

## 2017-11-16 PROCEDURE — 73552 X-RAY EXAM OF FEMUR 2/>: CPT | Mod: 26,RT,, | Performed by: RADIOLOGY

## 2017-11-16 PROCEDURE — 99213 OFFICE O/P EST LOW 20 MIN: CPT | Mod: PBBFAC,25,PN | Performed by: ORTHOPAEDIC SURGERY

## 2017-11-16 PROCEDURE — 73552 X-RAY EXAM OF FEMUR 2/>: CPT | Mod: TC,PN,RT

## 2017-11-16 PROCEDURE — 99203 OFFICE O/P NEW LOW 30 MIN: CPT | Mod: S$PBB,,, | Performed by: ORTHOPAEDIC SURGERY

## 2017-11-16 NOTE — PROGRESS NOTES
Past Medical History:   Diagnosis Date    Anemia     Cataract     Disorder of kidney and ureter     Hyperlipidemia     Hypertension     Malnutrition     Osteoporosis     PUD (peptic ulcer disease)     Restless leg syndrome     Rheumatoid arthritis     TIA (transient ischemic attack) 1/23/12       Past Surgical History:   Procedure Laterality Date    ADENOIDECTOMY      FRACTURE SURGERY      left hip ORIF    HAND SURGERY      HYSTERECTOMY      complete    TONSILLECTOMY         Current Outpatient Prescriptions   Medication Sig    aspirin (ECOTRIN) 81 MG EC tablet Take 162 mg by mouth once daily. 2 at night    clotrimazole (LOTRIMIN) 1 % Crea PLACE VAGINALLY EVERY EVENING AS DIRECTED    cyanocobalamin (VITAMIN B-12) 1000 MCG tablet Take 100 mcg by mouth once daily.    ergocalciferol (VITAMIN D2) 50,000 unit Cap Take 1 capsule (50,000 Units total) by mouth every 7 days.    fluticasone (FLONASE) 50 mcg/actuation nasal spray 1 spray by Each Nare route once daily.    lisinopril (PRINIVIL,ZESTRIL) 20 MG tablet Take 1 tablet (20 mg total) by mouth once daily.    nystatin-triamcinolone (MYCOLOG II) cream APPLY TO THE CORNERS OF THE MOUTH 4 TIMES A DAY    pantoprazole (PROTONIX) 40 MG tablet Take 1 tablet (40 mg total) by mouth once daily.    pramipexole (MIRAPEX) 0.125 MG tablet Take 0.125 mg by mouth every evening.    pravastatin (PRAVACHOL) 10 MG tablet Take 1 tablet (10 mg total) by mouth once daily.     No current facility-administered medications for this visit.        Review of patient's allergies indicates:   Allergen Reactions    Antihistamines - alkylamine Other (See Comments)     Other reaction(s): Hallucinations    Plavix [clopidogrel] Hives    Prednisone Hallucinations     Other reaction(s): Hallucinations    Tramadol     Triamcinolone Other (See Comments)     hallucinations    Penicillins Rash     Other reaction(s): Unknown       Family History   Problem Relation Age of Onset     Heart disease Mother     Heart disease Father     Cancer Brother      stomach cancer    No Known Problems Paternal Grandmother     No Known Problems Paternal Grandfather     Cancer Brother      duodenal cancer    Alcohol abuse Brother     Parkinsonism Brother     Alcohol abuse Brother     No Known Problems Son        Social History     Social History    Marital status:      Spouse name: N/A    Number of children: N/A    Years of education: N/A     Occupational History    Not on file.     Social History Main Topics    Smoking status: Never Smoker    Smokeless tobacco: Never Used    Alcohol use No    Drug use: No    Sexual activity: No     Other Topics Concern    Not on file     Social History Narrative    No narrative on file       Chief Complaint:   Chief Complaint   Patient presents with    Leg Pain       History of present illness: Is a 89-year-old female who was injured a couple weeks ago a wheelchair moved out from underneath her.  Been complaining a lot of hip and pelvis pain since then.  Pain as a 10 out of 10.  Difficulty sleeping.  Pain with sitting and standing.  Pain is located near the groin and buttock area.      Review of Systems:    Constitution: Negative for chills, fever, and sweats.  Negative for unexplained weight loss.    HENT:  Negative for headaches and blurry vision.    Cardiovascular:Negative for chest pain or irregular heart beat. Negative for hypertension.    Respiratory:  Negative for cough and shortness of breath.    Gastrointestinal: Negative for abdominal pain, heartburn, melena, nausea, and vomitting.    Genitourinary:  Negative bladder incontinence and dysuria.    Musculoskeletal:  See HPI    Neurological: Negative for numbness.    Psychiatric/Behavioral: Negative for depression.  The patient is not nervous/anxious.      Endocrine: Negative for polyuria    Hematologic/Lymphatic: Negative for bleeding problem.  Does not bruise/bleed easily.    Skin: Negative  for poor would healing and rash      Physical Examination:    Vital Signs:    Vitals:    11/16/17 0925   BP: (!) 146/66   Pulse: 73       Body mass index is 19.3 kg/m².    This a well-developed, well nourished patient in no acute distress.  They are alert and oriented and cooperative to examination.  Pt. comes in in a wheelchair.      Examination of the patient's right hip shows full range of motion with flexion to 160°, extension to 0, external rotation to 50°, internal rotation of 15°, abduction of 50°, adduction of 15°. Skin has no rashes or bruising. Negative BUZZ test. Patient has moderate pain with hip range of motion. Nontender to palpation over the greater trochanteric bursa. Patient is 5 out of 5 motor strength, palpable distal pulses, and intact light touch sensation.     Examination of the patient's left hip shows full range of motion with flexion to 160°, extension to 0, external rotation to 50°, internal rotation of 15°, abduction of 50°, adduction of 15°. Skin has no rashes or bruising.Patient has no pain with hip range of motion. Nontender to palpation over the greater trochanteric bursa. Patient is 5 out of 5 motor strength, palpable distal pulses, and intact light touch sensation.       X-rays: X-rays the right femur are ordered and reviewed which show severe tricompartmental knee arthritis and history of severe peripheral vascular disease with calcification of the femoral artery     Assessment:: Right hip strain    Plan:  I reviewed the x-ray with her and her friend today.  I do not see an acute fracture.  I do see old healed pelvic fractures.  I think she likely just strained some muscles.  Recommended alternating Aleve and Tylenol.  Follow-up in 3-4 weeks if not improving.    This note was created using Dragon voice recognition software that occasionally misinterpreted phrases or words.    Consult note is delivered via Epic messaging service.

## 2017-11-24 ENCOUNTER — DOCUMENTATION ONLY (OUTPATIENT)
Dept: FAMILY MEDICINE | Facility: CLINIC | Age: 82
End: 2017-11-24

## 2017-11-24 NOTE — PROGRESS NOTES
Pre-Visit Chart Review  For Appointment Scheduled on 11-27-17    Health Maintenance Due   Topic Date Due    Influenza Vaccine  08/01/2017

## 2017-11-27 ENCOUNTER — HOSPITAL ENCOUNTER (OUTPATIENT)
Dept: RADIOLOGY | Facility: CLINIC | Age: 82
Discharge: HOME | End: 2017-11-27
Attending: FAMILY MEDICINE
Payer: MEDICARE

## 2017-11-27 ENCOUNTER — OFFICE VISIT (OUTPATIENT)
Dept: FAMILY MEDICINE | Facility: CLINIC | Age: 82
End: 2017-11-27
Payer: MEDICARE

## 2017-11-27 VITALS
WEIGHT: 110.44 LBS | HEART RATE: 74 BPM | SYSTOLIC BLOOD PRESSURE: 142 MMHG | DIASTOLIC BLOOD PRESSURE: 68 MMHG | HEIGHT: 65 IN | TEMPERATURE: 98 F | BODY MASS INDEX: 18.4 KG/M2

## 2017-11-27 DIAGNOSIS — K21.9 GASTROESOPHAGEAL REFLUX DISEASE WITHOUT ESOPHAGITIS: ICD-10-CM

## 2017-11-27 DIAGNOSIS — E55.9 HYPOVITAMINOSIS D: ICD-10-CM

## 2017-11-27 DIAGNOSIS — M25.551 RIGHT HIP PAIN: ICD-10-CM

## 2017-11-27 DIAGNOSIS — I10 ESSENTIAL HYPERTENSION: Primary | ICD-10-CM

## 2017-11-27 DIAGNOSIS — Z86.73 HISTORY OF TIA (TRANSIENT ISCHEMIC ATTACK): ICD-10-CM

## 2017-11-27 DIAGNOSIS — N18.30 CHRONIC KIDNEY DISEASE, STAGE III (MODERATE): ICD-10-CM

## 2017-11-27 DIAGNOSIS — E78.2 MIXED HYPERLIPIDEMIA: ICD-10-CM

## 2017-11-27 DIAGNOSIS — J30.1 ACUTE SEASONAL ALLERGIC RHINITIS DUE TO POLLEN: ICD-10-CM

## 2017-11-27 DIAGNOSIS — Z23 FLU VACCINE NEED: ICD-10-CM

## 2017-11-27 DIAGNOSIS — R09.89 BILATERAL CAROTID BRUITS: ICD-10-CM

## 2017-11-27 DIAGNOSIS — D69.6 THROMBOCYTOPENIA: ICD-10-CM

## 2017-11-27 DIAGNOSIS — D64.9 ANEMIA, UNSPECIFIED TYPE: ICD-10-CM

## 2017-11-27 PROCEDURE — 73521 X-RAY EXAM HIPS BI 2 VIEWS: CPT | Mod: TC,PO

## 2017-11-27 PROCEDURE — 99999 PR PBB SHADOW E&M-EST. PATIENT-LVL III: CPT | Mod: PBBFAC,,, | Performed by: FAMILY MEDICINE

## 2017-11-27 PROCEDURE — G0008 ADMIN INFLUENZA VIRUS VAC: HCPCS | Mod: PBBFAC,PO

## 2017-11-27 PROCEDURE — 73521 X-RAY EXAM HIPS BI 2 VIEWS: CPT | Mod: 26,,, | Performed by: RADIOLOGY

## 2017-11-27 PROCEDURE — 99213 OFFICE O/P EST LOW 20 MIN: CPT | Mod: PBBFAC,25,PO | Performed by: FAMILY MEDICINE

## 2017-11-27 PROCEDURE — 99214 OFFICE O/P EST MOD 30 MIN: CPT | Mod: S$PBB,,, | Performed by: FAMILY MEDICINE

## 2017-11-27 RX ORDER — ERGOCALCIFEROL 1.25 MG/1
50000 CAPSULE ORAL
Qty: 12 CAPSULE | Refills: 1 | Status: SHIPPED | OUTPATIENT
Start: 2017-11-27 | End: 2018-06-12 | Stop reason: ALTCHOICE

## 2017-11-27 RX ORDER — PRAVASTATIN SODIUM 10 MG/1
10 TABLET ORAL DAILY
Qty: 90 TABLET | Refills: 3 | Status: SHIPPED | OUTPATIENT
Start: 2017-11-27 | End: 2018-06-12 | Stop reason: SDUPTHER

## 2017-11-27 RX ORDER — PANTOPRAZOLE SODIUM 40 MG/1
40 TABLET, DELAYED RELEASE ORAL DAILY
Qty: 90 TABLET | Refills: 3 | Status: SHIPPED | OUTPATIENT
Start: 2017-11-27 | End: 2018-09-12 | Stop reason: SDUPTHER

## 2017-11-27 RX ORDER — LISINOPRIL 20 MG/1
20 TABLET ORAL DAILY
Qty: 90 TABLET | Refills: 3 | Status: SHIPPED | OUTPATIENT
Start: 2017-11-27 | End: 2018-09-12 | Stop reason: SDUPTHER

## 2017-11-27 RX ORDER — FLUTICASONE PROPIONATE 50 MCG
1 SPRAY, SUSPENSION (ML) NASAL DAILY
Qty: 1 BOTTLE | Status: SHIPPED | OUTPATIENT
Start: 2017-11-27 | End: 2018-02-01 | Stop reason: SDUPTHER

## 2017-11-27 NOTE — PROGRESS NOTES
Subjective:       Patient ID: Melody Dupont is a 89 y.o. female.    Chief Complaint: Follow-up    Patient Active Problem List   Diagnosis    History of TIA (transient ischemic attack)    HTN (hypertension), onset 2014    Mixed hyperlipidemia, baseline LDL not available    Gastroesophageal reflux disease without esophagitis    Rheumatoid arthritis involving multiple sites with positive rheumatoid factor, onset 1972    BMI less than 19,adult    Underweight    Chronic kidney disease, stage III (moderate)    Carotid disease, bilateral    Abdominal aortic atherosclerosis    Malnutrition    Thrombocytopenia    Anemia    Hypovitaminosis D    Encounter for monitoring leflunomide therapy    Osteoporosis    Nonrheumatic aortic valve stenosis    RLS (restless legs syndrome)    Polycystic kidney disease    Excessive daytime sleepiness    Bilateral carotid bruits    Dysphagia    GERD (gastroesophageal reflux disease)     HPI  Review of Systems   Constitutional: Negative for fatigue and unexpected weight change.   Respiratory: Negative for chest tightness and shortness of breath.    Cardiovascular: Negative for chest pain, palpitations and leg swelling.   Gastrointestinal: Negative for abdominal pain.   Musculoskeletal: Negative for arthralgias.   Neurological: Negative for dizziness, syncope, light-headedness and headaches.       Objective:      Physical Exam   Constitutional: She is oriented to person, place, and time. She appears well-developed and well-nourished.   Cardiovascular: Normal rate, regular rhythm and normal heart sounds.    Pulmonary/Chest: Effort normal and breath sounds normal.   Musculoskeletal: She exhibits no edema.   Neurological: She is alert and oriented to person, place, and time.   Skin: Skin is warm and dry.   Psychiatric: She has a normal mood and affect.   Nursing note and vitals reviewed.      Assessment:       1. Essential hypertension    2. Mixed hyperlipidemia,  baseline LDL not available    3. Chronic kidney disease, stage III (moderate)    4. Thrombocytopenia    5. Anemia, unspecified type    6. Hypovitaminosis D    7. History of TIA (transient ischemic attack)    8. Bilateral carotid bruits    9. Gastroesophageal reflux disease without esophagitis    10. Acute seasonal allergic rhinitis due to pollen    11. Flu vaccine need    12. Right hip pain        Plan:       1. Essential hypertension  At goal < 150/90 for frail elderly. I counseled the patient on HTN education, management and recommendations.  I recommended weight loss toward a BMI < 25, avoidance of salt and the DASH diet, regular cardio exercise a minimum of 150 minutes per week and medications if indicated.  Printed materials were given.      2. Mixed hyperlipidemia, baseline LDL not available  Stable condition.  Continue current medications.  Will adjust based on lab findings or if condition changes.    - pravastatin (PRAVACHOL) 10 MG tablet; Take 1 tablet (10 mg total) by mouth once daily.  Dispense: 90 tablet; Refill: 3  - Comprehensive metabolic panel; Future  - Lipid panel; Future    3. Chronic kidney disease, stage III (moderate)  Stable and chronic.  Will continue to monitor q3-6 months and control chronic conditions as optimally as possible to preserve function.      4. Thrombocytopenia  Screen and treat as indicated:    - CBC auto differential; Future    5. Anemia, unspecified type  Cont to monitor  - CBC auto differential; Future    6. Hypovitaminosis D  Stable condition.  Continue current medications.  Will adjust based on lab findings or if condition changes.    - ergocalciferol (VITAMIN D2) 50,000 unit Cap; Take 1 capsule (50,000 Units total) by mouth every 7 days.  Dispense: 12 capsule; Refill: 1  - Vitamin D; Future    7. History of TIA (transient ischemic attack)  Cont to reduce risk factors. Cont asa  - pravastatin (PRAVACHOL) 10 MG tablet; Take 1 tablet (10 mg total) by mouth once daily.   Dispense: 90 tablet; Refill: 3    8. Bilateral carotid bruits  Cont asa and reduce risk factors  - pravastatin (PRAVACHOL) 10 MG tablet; Take 1 tablet (10 mg total) by mouth once daily.  Dispense: 90 tablet; Refill: 3    9. Gastroesophageal reflux disease without esophagitis  Controlled on current medications.  Continue current medications.    - pantoprazole (PROTONIX) 40 MG tablet; Take 1 tablet (40 mg total) by mouth once daily.  Dispense: 90 tablet; Refill: 3    10. Acute seasonal allergic rhinitis due to pollen  Recommend otc non-sedating antihistamine such as Loratadine and/or steroid nasal spray such as Flonase as directed and as needed.  Please return to clinic if symptoms persist after these interventions.    - fluticasone (FLONASE) 50 mcg/actuation nasal spray; 1 spray by Each Nare route once daily.  Dispense: 1 Bottle; Refill: prn    11. Flu vaccine need  Immunize today.  Counseled patient on risks, benefits and side effects.  Patient elected to proceed with vaccination.    - Influenza - High Dose (65+) (PF) (IM)    12. Right hip pain  RICE, tylenol otc prn, heat.  Fall precautions  - X-Ray Hips Bilateral 2 View Incl AP Pelvis; Future    [facility] goal documentation:  Patient readiness: acceptance and barriers:readiness  During the course of the visit the patient was educated and counseled about the following: Hypertension:   Dietary sodium restriction.  Regular aerobic exercise.  Goals: Hypertension: Reduce Blood Pressure  Goal/Outcomes met:Hypertension  The following self management tools provided:none  Patient Instructions (the written plan) was given to the patient/family: Yes  Time spent with patient: 20 minutes    Patient with be reevaluated in 3 months or sooner prn    Greater than 50% of this visit was spent counseling as described in above documentation:Yes

## 2017-11-27 NOTE — PROGRESS NOTES
2 patient identifiers used ( name &  ).  Administered Flu vaccine IM right deltoid.  Patient tolerated well, no bleeding at insertion site noted.  Pain scale 0/10.  Aseptic technique maintained.  Immunization information given to patient. Advised patient to remain in clinic for 15 minutes to monitor for reaction.  No AR noted.

## 2017-11-28 ENCOUNTER — TELEPHONE (OUTPATIENT)
Dept: FAMILY MEDICINE | Facility: CLINIC | Age: 82
End: 2017-11-28

## 2017-11-28 NOTE — TELEPHONE ENCOUNTER
----- Message from Tammy Garcia sent at 11/28/2017 11:31 AM CST -----  Contact: Patient  Melody, patient [phone] calling for results of her x-ray from 11/27/17. Please advise. Thanks!

## 2017-12-04 ENCOUNTER — TELEPHONE (OUTPATIENT)
Dept: ORTHOPEDICS | Facility: CLINIC | Age: 82
End: 2017-12-04

## 2017-12-04 NOTE — TELEPHONE ENCOUNTER
----- Message from Natalia Miranda sent at 12/4/2017  9:38 AM CST -----  Patient states that she is in so much pain from a possible right hip fracture and would like to get in today. Please call to schedule at [phone].

## 2017-12-07 ENCOUNTER — OFFICE VISIT (OUTPATIENT)
Dept: ORTHOPEDICS | Facility: CLINIC | Age: 82
End: 2017-12-07
Payer: MEDICARE

## 2017-12-07 VITALS
HEART RATE: 80 BPM | BODY MASS INDEX: 18.33 KG/M2 | DIASTOLIC BLOOD PRESSURE: 63 MMHG | HEIGHT: 65 IN | WEIGHT: 110 LBS | SYSTOLIC BLOOD PRESSURE: 151 MMHG

## 2017-12-07 DIAGNOSIS — S32.9XXD CLOSED NONDISPLACED FRACTURE OF PELVIS WITH ROUTINE HEALING, UNSPECIFIED PART OF PELVIS, SUBSEQUENT ENCOUNTER: Primary | ICD-10-CM

## 2017-12-07 DIAGNOSIS — R10.2 PELVIC PAIN: Primary | ICD-10-CM

## 2017-12-07 PROCEDURE — 99999 PR PBB SHADOW E&M-EST. PATIENT-LVL III: CPT | Mod: PBBFAC,,, | Performed by: ORTHOPAEDIC SURGERY

## 2017-12-07 PROCEDURE — 99213 OFFICE O/P EST LOW 20 MIN: CPT | Mod: PBBFAC,PN | Performed by: ORTHOPAEDIC SURGERY

## 2017-12-07 PROCEDURE — 99213 OFFICE O/P EST LOW 20 MIN: CPT | Mod: S$PBB,,, | Performed by: ORTHOPAEDIC SURGERY

## 2017-12-07 RX ORDER — HYDROCODONE BITARTRATE AND ACETAMINOPHEN 7.5; 325 MG/1; MG/1
1 TABLET ORAL EVERY 6 HOURS PRN
Qty: 30 TABLET | Refills: 0 | Status: SHIPPED | OUTPATIENT
Start: 2017-12-07 | End: 2017-12-16 | Stop reason: SINTOL

## 2017-12-07 RX ORDER — HYDROCODONE BITARTRATE AND ACETAMINOPHEN 7.5; 325 MG/1; MG/1
1 TABLET ORAL EVERY 6 HOURS PRN
Qty: 30 TABLET | Refills: 0 | Status: SHIPPED | OUTPATIENT
Start: 2017-12-07 | End: 2017-12-07 | Stop reason: SDUPTHER

## 2017-12-07 NOTE — PROGRESS NOTES
Past Medical History:   Diagnosis Date    Anemia     Cataract     Disorder of kidney and ureter     Hyperlipidemia     Hypertension     Malnutrition     Osteoporosis     PUD (peptic ulcer disease)     Restless leg syndrome     Rheumatoid arthritis     TIA (transient ischemic attack) 1/23/12       Past Surgical History:   Procedure Laterality Date    ADENOIDECTOMY      FRACTURE SURGERY      left hip ORIF    HAND SURGERY      HYSTERECTOMY      complete    TONSILLECTOMY         Current Outpatient Prescriptions   Medication Sig    aspirin (ECOTRIN) 81 MG EC tablet Take 162 mg by mouth once daily. 2 at night    clotrimazole (LOTRIMIN) 1 % Crea PLACE VAGINALLY EVERY EVENING AS DIRECTED    cyanocobalamin (VITAMIN B-12) 1000 MCG tablet Take 100 mcg by mouth once daily.    ergocalciferol (VITAMIN D2) 50,000 unit Cap Take 1 capsule (50,000 Units total) by mouth every 7 days.    fluticasone (FLONASE) 50 mcg/actuation nasal spray 1 spray by Each Nare route once daily.    lisinopril (PRINIVIL,ZESTRIL) 20 MG tablet Take 1 tablet (20 mg total) by mouth once daily.    nystatin-triamcinolone (MYCOLOG II) cream APPLY TO THE CORNERS OF THE MOUTH 4 TIMES A DAY    pantoprazole (PROTONIX) 40 MG tablet Take 1 tablet (40 mg total) by mouth once daily.    pravastatin (PRAVACHOL) 10 MG tablet Take 1 tablet (10 mg total) by mouth once daily.     No current facility-administered medications for this visit.        Review of patient's allergies indicates:   Allergen Reactions    Antihistamines - alkylamine Other (See Comments)     Other reaction(s): Hallucinations    Plavix [clopidogrel] Hives    Prednisone Hallucinations     Other reaction(s): Hallucinations    Tramadol     Triamcinolone Other (See Comments)     hallucinations    Penicillins Rash     Other reaction(s): Unknown       Family History   Problem Relation Age of Onset    Heart disease Mother     Heart disease Father     Cancer Brother      stomach  cancer    No Known Problems Paternal Grandmother     No Known Problems Paternal Grandfather     Cancer Brother      duodenal cancer    Alcohol abuse Brother     Parkinsonism Brother     Alcohol abuse Brother     No Known Problems Son        Social History     Social History    Marital status:      Spouse name: N/A    Number of children: N/A    Years of education: N/A     Occupational History    Not on file.     Social History Main Topics    Smoking status: Never Smoker    Smokeless tobacco: Never Used    Alcohol use No    Drug use: No    Sexual activity: No     Other Topics Concern    Not on file     Social History Narrative    No narrative on file       Chief Complaint:   Chief Complaint   Patient presents with    Hip Pain     right hip pain       History of present illness: Is a 89-year-old female who was injured a couple weeks ago when wheelchair moved out from underneath her.  Been complaining a lot of hip and pelvis pain since then.  Pain as a 10 out of 10.  Difficulty sleeping.  Pain with sitting and standing.  Pain is located near the groin and buttock area.  She went to [facility] last weekend where a CT scan was done which showed some age indeterminate albicans insufficiency fractures as well as healed rami fractures that we know about.      Review of Systems:    Constitution: Negative for chills, fever, and sweats.  Negative for unexplained weight loss.    HENT:  Negative for headaches and blurry vision.    Cardiovascular:Negative for chest pain or irregular heart beat. Negative for hypertension.    Respiratory:  Negative for cough and shortness of breath.    Gastrointestinal: Negative for abdominal pain, heartburn, melena, nausea, and vomitting.    Genitourinary:  Negative bladder incontinence and dysuria.    Musculoskeletal:  See HPI    Neurological: Negative for numbness.    Psychiatric/Behavioral: Negative for depression.  The patient is not nervous/anxious.       Endocrine: Negative for polyuria    Hematologic/Lymphatic: Negative for bleeding problem.  Does not bruise/bleed easily.    Skin: Negative for poor would healing and rash      Physical Examination:    Vital Signs:    Vitals:    12/07/17 1338   BP: (!) 151/63   Pulse: 80       Body mass index is 18.3 kg/m².    This a well-developed, well nourished patient in no acute distress.  They are alert and oriented and cooperative to examination.  Pt. comes in in a wheelchair.      Examination of the patient's right hip shows full range of motion with flexion to 160°, extension to 0, external rotation to 50°, internal rotation of 15°, abduction of 50°, adduction of 15°. Skin has no rashes or bruising. Negative BUZZ test. Patient has moderate pain with hip range of motion. Nontender to palpation over the greater trochanteric bursa. Patient is 5 out of 5 motor strength, palpable distal pulses, and intact light touch sensation.     Examination of the patient's left hip shows full range of motion with flexion to 160°, extension to 0, external rotation to 50°, internal rotation of 15°, abduction of 50°, adduction of 15°. Skin has no rashes or bruising.Patient has no pain with hip range of motion. Nontender to palpation over the greater trochanteric bursa. Patient is 5 out of 5 motor strength, palpable distal pulses, and intact light touch sensation.       X-rays: X-rays the right femur are reviewed which show severe tricompartmental knee arthritis and history of severe peripheral vascular disease with calcification of the femoral artery     Assessment:: Right hip strain versus insufficiency fractures    Plan:  I recommended an MRI to evaluate for acute fracture.  Gave her a small prescription of Norco 7.5 for pain.    This note was created using Dragon voice recognition software that occasionally misinterpreted phrases or words.    Consult note is delivered via Epic messaging service.

## 2017-12-08 ENCOUNTER — HOSPITAL ENCOUNTER (OUTPATIENT)
Dept: RADIOLOGY | Facility: HOSPITAL | Age: 82
Discharge: HOME | End: 2017-12-08
Attending: ORTHOPAEDIC SURGERY
Payer: MEDICARE

## 2017-12-08 DIAGNOSIS — R10.2 PELVIC PAIN: ICD-10-CM

## 2017-12-08 PROCEDURE — 72195 MRI PELVIS W/O DYE: CPT | Mod: 26,,, | Performed by: RADIOLOGY

## 2017-12-08 PROCEDURE — 72195 MRI PELVIS W/O DYE: CPT | Mod: TC

## 2017-12-11 ENCOUNTER — OFFICE VISIT (OUTPATIENT)
Dept: ORTHOPEDICS | Facility: CLINIC | Age: 82
End: 2017-12-11
Payer: MEDICARE

## 2017-12-11 ENCOUNTER — TELEPHONE (OUTPATIENT)
Dept: ORTHOPEDICS | Facility: CLINIC | Age: 82
End: 2017-12-11

## 2017-12-11 VITALS
BODY MASS INDEX: 18.33 KG/M2 | DIASTOLIC BLOOD PRESSURE: 59 MMHG | SYSTOLIC BLOOD PRESSURE: 111 MMHG | WEIGHT: 110 LBS | HEART RATE: 82 BPM | HEIGHT: 65 IN

## 2017-12-11 DIAGNOSIS — S76.011D HIP STRAIN, RIGHT, SUBSEQUENT ENCOUNTER: Primary | ICD-10-CM

## 2017-12-11 PROCEDURE — 99213 OFFICE O/P EST LOW 20 MIN: CPT | Mod: S$PBB,,, | Performed by: ORTHOPAEDIC SURGERY

## 2017-12-11 PROCEDURE — 99999 PR PBB SHADOW E&M-EST. PATIENT-LVL III: CPT | Mod: PBBFAC,,, | Performed by: ORTHOPAEDIC SURGERY

## 2017-12-11 PROCEDURE — 99213 OFFICE O/P EST LOW 20 MIN: CPT | Mod: PBBFAC,PN | Performed by: ORTHOPAEDIC SURGERY

## 2017-12-11 NOTE — PROGRESS NOTES
Past Medical History:   Diagnosis Date    Anemia     Cataract     Disorder of kidney and ureter     Hyperlipidemia     Hypertension     Malnutrition     Osteoporosis     PUD (peptic ulcer disease)     Restless leg syndrome     Rheumatoid arthritis     TIA (transient ischemic attack) 1/23/12       Past Surgical History:   Procedure Laterality Date    ADENOIDECTOMY      FRACTURE SURGERY      left hip ORIF    HAND SURGERY      HYSTERECTOMY      complete    TONSILLECTOMY         Current Outpatient Prescriptions   Medication Sig    aspirin (ECOTRIN) 81 MG EC tablet Take 162 mg by mouth once daily. 2 at night    clotrimazole (LOTRIMIN) 1 % Crea PLACE VAGINALLY EVERY EVENING AS DIRECTED    cyanocobalamin (VITAMIN B-12) 1000 MCG tablet Take 100 mcg by mouth once daily.    ergocalciferol (VITAMIN D2) 50,000 unit Cap Take 1 capsule (50,000 Units total) by mouth every 7 days.    fluticasone (FLONASE) 50 mcg/actuation nasal spray 1 spray by Each Nare route once daily.    hydrocodone-acetaminophen 7.5-325mg (NORCO) 7.5-325 mg per tablet Take 1 tablet by mouth every 6 (six) hours as needed for Pain.    lisinopril (PRINIVIL,ZESTRIL) 20 MG tablet Take 1 tablet (20 mg total) by mouth once daily.    nystatin-triamcinolone (MYCOLOG II) cream APPLY TO THE CORNERS OF THE MOUTH 4 TIMES A DAY    pantoprazole (PROTONIX) 40 MG tablet Take 1 tablet (40 mg total) by mouth once daily.    pravastatin (PRAVACHOL) 10 MG tablet Take 1 tablet (10 mg total) by mouth once daily.     No current facility-administered medications for this visit.        Review of patient's allergies indicates:   Allergen Reactions    Antihistamines - alkylamine Other (See Comments)     Other reaction(s): Hallucinations    Plavix [clopidogrel] Hives    Prednisone Hallucinations     Other reaction(s): Hallucinations    Tramadol     Triamcinolone Other (See Comments)     hallucinations    Penicillins Rash     Other reaction(s): Unknown        Family History   Problem Relation Age of Onset    Heart disease Mother     Heart disease Father     Cancer Brother      stomach cancer    No Known Problems Paternal Grandmother     No Known Problems Paternal Grandfather     Cancer Brother      duodenal cancer    Alcohol abuse Brother     Parkinsonism Brother     Alcohol abuse Brother     No Known Problems Son        Social History     Social History    Marital status:      Spouse name: N/A    Number of children: N/A    Years of education: N/A     Occupational History    Not on file.     Social History Main Topics    Smoking status: Never Smoker    Smokeless tobacco: Never Used    Alcohol use No    Drug use: No    Sexual activity: No     Other Topics Concern    Not on file     Social History Narrative    No narrative on file       Chief Complaint:   Chief Complaint   Patient presents with    Hip Pain     mri results of pelvis        History of present illness: Is a 89-year-old female who was injured a couple weeks ago when wheelchair moved out from underneath her.  Been complaining a lot of hip and pelvis pain since then.  Pain as a 10 out of 10.  Difficulty sleeping.  Pain with sitting and standing.  Pain is located near the groin and buttock area.  She went to [facility] last weekend where a CT scan was done which showed some age indeterminate albicans insufficiency fractures as well as healed rami fractures that we know about.  MRI did show a second-degree hip strain.  No new fracture noted.      Review of Systems:    Constitution: Negative for chills, fever, and sweats.  Negative for unexplained weight loss.    HENT:  Negative for headaches and blurry vision.    Cardiovascular:Negative for chest pain or irregular heart beat. Negative for hypertension.    Respiratory:  Negative for cough and shortness of breath.    Gastrointestinal: Negative for abdominal pain, heartburn, melena, nausea, and vomitting.    Genitourinary:   Negative bladder incontinence and dysuria.    Musculoskeletal:  See HPI    Neurological: Negative for numbness.    Psychiatric/Behavioral: Negative for depression.  The patient is not nervous/anxious.      Endocrine: Negative for polyuria    Hematologic/Lymphatic: Negative for bleeding problem.  Does not bruise/bleed easily.    Skin: Negative for poor would healing and rash      Physical Examination:    Vital Signs:    Vitals:    12/11/17 1528   BP: (!) 111/59   Pulse: 82       Body mass index is 18.3 kg/m².    This a well-developed, well nourished patient in no acute distress.  They are alert and oriented and cooperative to examination.  Pt. comes in in a wheelchair.      Examination of the patient's right hip shows full range of motion with flexion to 160°, extension to 0, external rotation to 50°, internal rotation of 15°, abduction of 50°, adduction of 15°. Skin has no rashes or bruising. Negative BUZZ test. Patient has moderate pain with hip range of motion. Nontender to palpation over the greater trochanteric bursa. Patient is 5 out of 5 motor strength, palpable distal pulses, and intact light touch sensation.         X-rays: X-rays the right femur are reviewed which show severe tricompartmental knee arthritis and history of severe peripheral vascular disease with calcification of the femoral artery    MRI of the pelvis:Second degree strain of the right iliopsoas muscle and of the abductor muscle group of the right hip. Fracture or dislocation is not seen.  2 cm abnormal MR signal focus identified in the right iliac bone above the acetabulum. A metastasis is not ruled out. Less likely would be a nonossifying fibroma..  Old fractures of the right ischium and pubis. Old fracture of the left femoral neck status post ORIF and subsequent hardware removal.     Assessment:: Right hip strain     Plan:  I reviewed the MRI with her.  I recommended home health physical therapy to help with hip strain.  Follow-up in 6  weeks.    This note was created using Dragon voice recognition software that occasionally misinterpreted phrases or words.    Consult note is delivered via Epic messaging service.

## 2017-12-11 NOTE — TELEPHONE ENCOUNTER
----- Message from Manjula Ji sent at 12/8/2017  2:11 PM CST -----  Contact: Melody  Patient is checking on result from MRI done this morning. Please call [phone]. Thanks!

## 2017-12-15 ENCOUNTER — TELEPHONE (OUTPATIENT)
Dept: FAMILY MEDICINE | Facility: CLINIC | Age: 82
End: 2017-12-15

## 2017-12-15 NOTE — TELEPHONE ENCOUNTER
Was able to get patient in tomorrow with SHRUTHI Marquez. Patient is having AMS. Trying to call 911 saying no one is caring for her. She is not c/o burning but does have frequency. She has not been taking the pain medication as I thought this may also be a cause.

## 2017-12-16 ENCOUNTER — OFFICE VISIT (OUTPATIENT)
Dept: FAMILY MEDICINE | Facility: CLINIC | Age: 82
End: 2017-12-16
Payer: MEDICARE

## 2017-12-16 VITALS — SYSTOLIC BLOOD PRESSURE: 136 MMHG | TEMPERATURE: 99 F | DIASTOLIC BLOOD PRESSURE: 60 MMHG | HEART RATE: 75 BPM

## 2017-12-16 DIAGNOSIS — I10 ESSENTIAL HYPERTENSION: ICD-10-CM

## 2017-12-16 DIAGNOSIS — R41.82 ALTERED MENTAL STATUS, UNSPECIFIED ALTERED MENTAL STATUS TYPE: Primary | ICD-10-CM

## 2017-12-16 DIAGNOSIS — R44.3 HALLUCINATION: ICD-10-CM

## 2017-12-16 LAB
BILIRUB SERPL-MCNC: NORMAL MG/DL
BLOOD URINE, POC: NORMAL
COLOR, POC UA: NORMAL
GLUCOSE UR QL STRIP: NORMAL
KETONES UR QL STRIP: NORMAL
LEUKOCYTE ESTERASE URINE, POC: NORMAL
NITRITE, POC UA: NORMAL
PH, POC UA: 5
PROTEIN, POC: NORMAL
SPECIFIC GRAVITY, POC UA: 1.01
UROBILINOGEN, POC UA: NORMAL

## 2017-12-16 PROCEDURE — 87086 URINE CULTURE/COLONY COUNT: CPT

## 2017-12-16 PROCEDURE — 99213 OFFICE O/P EST LOW 20 MIN: CPT | Mod: S$PBB,,, | Performed by: NURSE PRACTITIONER

## 2017-12-16 PROCEDURE — 99214 OFFICE O/P EST MOD 30 MIN: CPT | Mod: PBBFAC,PO | Performed by: NURSE PRACTITIONER

## 2017-12-16 PROCEDURE — 81002 URINALYSIS NONAUTO W/O SCOPE: CPT | Mod: PBBFAC,PO | Performed by: NURSE PRACTITIONER

## 2017-12-16 PROCEDURE — 99999 PR PBB SHADOW E&M-EST. PATIENT-LVL IV: CPT | Mod: PBBFAC,,, | Performed by: NURSE PRACTITIONER

## 2017-12-16 RX ORDER — PRAMIPEXOLE DIHYDROCHLORIDE 0.12 MG/1
0.12 TABLET ORAL 3 TIMES DAILY
COMMUNITY
End: 2018-06-12 | Stop reason: ALTCHOICE

## 2017-12-16 RX ORDER — DOCUSATE SODIUM 100 MG/1
100 CAPSULE, LIQUID FILLED ORAL 2 TIMES DAILY
COMMUNITY

## 2017-12-16 NOTE — PROGRESS NOTES
Subjective:       Patient ID: Melody Dupont is a 89 y.o. female.    Chief Complaint: Altered Mental Status    Ms. Dupont presents to the clinic today, accompanied by her granddaughter, for concern of hallucination and double vision which occurred four nights ago, lasting two days.  She has history of TIA's but this was different from the TIA's she has had in the past.  She did not have weakness or numbness.  She was taking new medication, Norco, for right hip pain immediately prior to the onset of symptoms.  She Stopped the Norco two days ago and felt better.  She no longer has hallucinations or double vision. Denies new weakness or confusion.        Review of Systems   Constitutional: Negative for chills and fever.   Eyes: Positive for visual disturbance (now resolved).   Respiratory: Negative for cough and shortness of breath.    Cardiovascular: Negative for chest pain.   Gastrointestinal: Negative for abdominal pain, constipation and diarrhea.   Genitourinary: Negative for dysuria and flank pain.   Musculoskeletal: Positive for arthralgias.   Psychiatric/Behavioral: Positive for hallucinations (now resolved). Negative for agitation.       Objective:      Physical Exam   Constitutional: She is oriented to person, place, and time. She appears well-nourished. No distress.   HENT:   Head: Normocephalic and atraumatic.   Right Ear: External ear normal.   Left Ear: External ear normal.   Mouth/Throat: Oropharynx is clear and moist. No oropharyngeal exudate.   Eyes: Pupils are equal, round, and reactive to light. Right eye exhibits no discharge. Left eye exhibits no discharge.   Neck: Neck supple. No thyromegaly present.   Cardiovascular: Normal rate and regular rhythm.  Exam reveals no gallop and no friction rub.    No murmur heard.  Pulmonary/Chest: Effort normal and breath sounds normal. No respiratory distress. She has no wheezes. She has no rales.   Abdominal: Soft. She exhibits no distension. There is no  tenderness.   Musculoskeletal:   Bilateral hand contractures   Lymphadenopathy:     She has no cervical adenopathy.   Neurological: She is alert and oriented to person, place, and time. She is not disoriented. No cranial nerve deficit. Coordination normal. GCS eye subscore is 4. GCS verbal subscore is 5. GCS motor subscore is 6.   Legs are weak bilaterally but with equal strength; patient in wheelchair.  No arm drift.   Skin: Skin is warm and dry.   Psychiatric: She has a normal mood and affect. Her behavior is normal. Thought content normal.   Vitals reviewed.          Current Outpatient Prescriptions:     aspirin (ECOTRIN) 81 MG EC tablet, Take 162 mg by mouth once daily. 2 at night, Disp: , Rfl:     docusate sodium (COLACE) 100 MG capsule, Take 100 mg by mouth 2 (two) times daily., Disp: , Rfl:     ergocalciferol (VITAMIN D2) 50,000 unit Cap, Take 1 capsule (50,000 Units total) by mouth every 7 days., Disp: 12 capsule, Rfl: 1    lisinopril (PRINIVIL,ZESTRIL) 20 MG tablet, Take 1 tablet (20 mg total) by mouth once daily., Disp: 90 tablet, Rfl: 3    pantoprazole (PROTONIX) 40 MG tablet, Take 1 tablet (40 mg total) by mouth once daily., Disp: 90 tablet, Rfl: 3    pramipexole (MIRAPEX) 0.125 MG tablet, Take 0.125 mg by mouth 3 (three) times daily., Disp: , Rfl:     pravastatin (PRAVACHOL) 10 MG tablet, Take 1 tablet (10 mg total) by mouth once daily., Disp: 90 tablet, Rfl: 3    clotrimazole (LOTRIMIN) 1 % Crea, PLACE VAGINALLY EVERY EVENING AS DIRECTED, Disp: 45 g, Rfl: 0    cyanocobalamin (VITAMIN B-12) 1000 MCG tablet, Take 100 mcg by mouth once daily., Disp: , Rfl:     fluticasone (FLONASE) 50 mcg/actuation nasal spray, 1 spray by Each Nare route once daily., Disp: 1 Bottle, Rfl: prn    nystatin-triamcinolone (MYCOLOG II) cream, APPLY TO THE CORNERS OF THE MOUTH 4 TIMES A DAY, Disp: , Rfl: 0  Assessment:       1. Altered mental status, unspecified altered mental status type    2. Essential hypertension     3. Hallucination        Plan:       Altered mental status, unspecified altered mental status type  I feel most likely cause was Norco use as the symptoms stopped with its discontinuation.  Advised to use Tylenol for hip pain.   Do not suspect stroke/TIA, although patient with history of TIAs and therefore given handout with symptoms of stroke as well as discussing these with patient.  Go to ED if stroke symptoms occur.  -     POCT URINE DIPSTICK WITHOUT MICROSCOPE  -     Urine culture; Future; Expected date: 12/16/2017    Essential hypertension  Stable on current medication.    Hallucination  Resolved, likely due to Norco.  Stop [place].    Patient readiness: acceptance and barriers:none    During the course of the visit the patient was educated and counseled about the following:     Hypertension:   Medication: no change.    Goals: Hypertension: Reduce Blood Pressure    Did patient meet goals/outcomes: Yes    The following self management tools provided: declined    Patient Instructions (the written plan) was given to the patient/family.     Time spent with patient: 15 minutes    Barriers to medications present (no )    Adverse reactions to current medications (yes)    Over the counter medications reviewed (Yes)

## 2017-12-16 NOTE — PATIENT INSTRUCTIONS
Symptoms of a Stroke     A sudden feeling of weakness on one side of your body may be a sign that you are having a stroke.   During a stroke, blood stops flowing to part of the brain. This can damage areas in the brain that control the rest of the body. Get help right away if any of these symptoms come on suddenly, even if the symptoms dont last.  Know the symptoms of a stroke  · Weakness. You may feel a sudden weakness, tingling, or a loss of feeling on 1 side of your face or body including your arm or leg.   · Vision problems. You may have sudden double vision or trouble seeing in 1 or both eyes.  · Speech problems. You may have sudden trouble talking, slurred speech, or problems understanding others.  · Headache. You may have a sudden, severe headache.  · Movement problems. You may have sudden trouble walking, dizziness, a feeling of spinning, a loss of balance, a feeling of falling, or blackouts.  · Seizure. You may also have a seizure with a large or hemorrhagic stroke.   Remember: If you have any of these symptoms, call 911 and your doctor as soon as possible.  F.A.S.T. is an easy way to remember the signs of a stroke. When you see these signs, you will know that you need to call 911 fast.   F.A.S.T. stands for:  · F is for face drooping - One side of the face is drooping or numb. When the person smiles, the smile is uneven.  · A is for arm weakness - One arm is weak or numb. When the person lifts both arms at the same time, one arm may drift downward.  · S is for speech difficulty - You may notice slurred speech or difficulty speaking. The person can't repeat a simple sentence correctly when asked.  · T is for time to dial 911 - If someone shows any of these symptoms, even if they go away, call 911 immediately. Make note of the time the symptoms first appeared.   Date Last Reviewed: 8/26/2015 © 2000-2017 [employer]. [address] [place] [address], [place] [address]. All rights reserved. This  information is not intended as a substitute for professional medical care. Always follow your healthcare professional's instructions.

## 2017-12-17 LAB — BACTERIA UR CULT: NO GROWTH

## 2018-01-01 RX ORDER — LISINOPRIL 20 MG/1
TABLET ORAL
Qty: 90 TABLET | Refills: 3 | Status: SHIPPED | OUTPATIENT
Start: 2018-01-01 | End: 2018-01-19 | Stop reason: SDUPTHER

## 2018-01-19 ENCOUNTER — OFFICE VISIT (OUTPATIENT)
Dept: FAMILY MEDICINE | Facility: CLINIC | Age: 83
End: 2018-01-19
Payer: MEDICARE

## 2018-01-19 VITALS
SYSTOLIC BLOOD PRESSURE: 147 MMHG | BODY MASS INDEX: 19.83 KG/M2 | WEIGHT: 119.06 LBS | DIASTOLIC BLOOD PRESSURE: 65 MMHG | HEART RATE: 84 BPM | HEIGHT: 65 IN

## 2018-01-19 DIAGNOSIS — L29.9 ITCHY SCALP: Primary | ICD-10-CM

## 2018-01-19 DIAGNOSIS — I10 ESSENTIAL HYPERTENSION: ICD-10-CM

## 2018-01-19 DIAGNOSIS — R63.6 UNDERWEIGHT: ICD-10-CM

## 2018-01-19 PROCEDURE — 99999 PR PBB SHADOW E&M-EST. PATIENT-LVL III: CPT | Mod: PBBFAC,,, | Performed by: PHYSICIAN ASSISTANT

## 2018-01-19 PROCEDURE — 99213 OFFICE O/P EST LOW 20 MIN: CPT | Mod: S$PBB,,, | Performed by: PHYSICIAN ASSISTANT

## 2018-01-19 PROCEDURE — 99213 OFFICE O/P EST LOW 20 MIN: CPT | Mod: PBBFAC,PO | Performed by: PHYSICIAN ASSISTANT

## 2018-01-19 NOTE — PROGRESS NOTES
Subjective:       Patient ID: Melody Dupont is a 89 y.o. female.    Chief Complaint: Hair/Scalp Problem (Scalp itching)    Mrs. Dupont is a 89 year old female who presents to clinic for evaluation of itchy scalp. She complains of 2 year history of itchy scalp that was previously evaluated by Dermatology without any revealing diagnosis. She states symptoms have worsened over the last 2 months. She denies flaking or rash on the scalp. She is washing her hair only every 1-2 weeks at the [facility] [facility]. She has no other concerns today.       Review of Systems   Constitutional: Negative for activity change, appetite change, chills, fatigue and fever.   Eyes: Negative for visual disturbance.   Respiratory: Negative for cough and shortness of breath.    Cardiovascular: Negative for chest pain, palpitations and leg swelling.   Gastrointestinal: Negative for abdominal pain, constipation, diarrhea, nausea and vomiting.   Musculoskeletal: Negative for arthralgias.   Skin:        Itchy scalp   Neurological: Negative for dizziness, weakness, light-headedness and headaches.       Objective:      Vitals:    01/19/18 0747   BP: (!) 147/65   Pulse: 84     Physical Exam   Constitutional: She is oriented to person, place, and time. She appears well-developed and well-nourished.   HENT:   Head: Normocephalic and atraumatic.   Eyes: Conjunctivae and EOM are normal. Pupils are equal, round, and reactive to light.   Cardiovascular: Normal rate, regular rhythm, normal heart sounds and intact distal pulses.    Pulmonary/Chest: Effort normal and breath sounds normal.   Neurological: She is alert and oriented to person, place, and time.   Skin: Skin is warm and dry.   No evidence of rash on the scalp. Faint excoriations near the hairline at the back of the neck likely from scratching. No evidence of psoriasis or seborrheic dermatitis.    Psychiatric: She has a normal mood and affect. Her behavior is normal.   Vitals reviewed.       Assessment:       1. Itchy scalp    2. Essential hypertension    3. Underweight        Plan:       Itchy scalp       - Trial of OTC scalpicin. Use moisturizing shampoo and conditioner. Consider Dermatology referral if no improvement.     Essential hypertension        - Stable on current medications. Goal <150/90 given advanced age.    Underweight         - See below    Patient readiness: acceptance and barriers:none    During the course of the visit the patient was educated and counseled about the following:     Hypertension:   Medication: no change.  Underweight:  Follow up and re-weight in: 3  months and as needed.     Goals: Hypertension: Reduce Blood Pressure and Underweight: Increase calorie intake and BMI      Did patient meet goals/outcomes: No    The following self management tools provided: declined    Patient Instructions (the written plan) was given to the patient/family.     Time spent with patient: 15 minutes     Follow up with Dr. Loving in 2-3 months

## 2018-02-01 DIAGNOSIS — J30.1 ACUTE SEASONAL ALLERGIC RHINITIS DUE TO POLLEN: ICD-10-CM

## 2018-02-01 NOTE — TELEPHONE ENCOUNTER
----- Message from Aline Nash sent at 1/31/2018  3:03 PM CST -----  Contact: self   Patient need a refill for rx fluticasone (FLONASE) 50 mcg/actuation nasal spray, please send to [facility]. Please call back at [phone] (home)       [facility]/pharmacy #[id number] - [place] - [address]  [address] [address]  Phone: [phone] Fax: [phone]

## 2018-02-02 RX ORDER — FLUTICASONE PROPIONATE 50 MCG
1 SPRAY, SUSPENSION (ML) NASAL DAILY
Qty: 1 BOTTLE | Status: SHIPPED | OUTPATIENT
Start: 2018-02-02 | End: 2020-01-01 | Stop reason: SDUPTHER

## 2018-03-09 ENCOUNTER — DOCUMENTATION ONLY (OUTPATIENT)
Dept: FAMILY MEDICINE | Facility: CLINIC | Age: 83
End: 2018-03-09

## 2018-03-09 NOTE — PROGRESS NOTES
Pre-Visit Chart Review  For Appointment Scheduled on 03/12/2018    There are no preventive care reminders to display for this patient.

## 2018-03-10 ENCOUNTER — LAB VISIT (OUTPATIENT)
Dept: LAB | Facility: HOSPITAL | Age: 83
End: 2018-03-10
Attending: FAMILY MEDICINE
Payer: MEDICARE

## 2018-03-10 DIAGNOSIS — E78.2 MIXED HYPERLIPIDEMIA: ICD-10-CM

## 2018-03-10 DIAGNOSIS — D64.9 ANEMIA, UNSPECIFIED TYPE: ICD-10-CM

## 2018-03-10 DIAGNOSIS — D69.6 THROMBOCYTOPENIA: ICD-10-CM

## 2018-03-10 DIAGNOSIS — E55.9 HYPOVITAMINOSIS D: ICD-10-CM

## 2018-03-10 LAB
25(OH)D3+25(OH)D2 SERPL-MCNC: 38 NG/ML
ALBUMIN SERPL BCP-MCNC: 3.5 G/DL
ALP SERPL-CCNC: 64 U/L
ALT SERPL W/O P-5'-P-CCNC: 9 U/L
ANION GAP SERPL CALC-SCNC: 12 MMOL/L
AST SERPL-CCNC: 18 U/L
BASOPHILS # BLD AUTO: 0.05 K/UL
BASOPHILS NFR BLD: 0.7 %
BILIRUB SERPL-MCNC: 0.3 MG/DL
BUN SERPL-MCNC: 23 MG/DL
CALCIUM SERPL-MCNC: 9.7 MG/DL
CHLORIDE SERPL-SCNC: 106 MMOL/L
CHOLEST SERPL-MCNC: 168 MG/DL
CHOLEST/HDLC SERPL: 2.8 {RATIO}
CO2 SERPL-SCNC: 22 MMOL/L
CREAT SERPL-MCNC: 1 MG/DL
DIFFERENTIAL METHOD: ABNORMAL
EOSINOPHIL # BLD AUTO: 0.2 K/UL
EOSINOPHIL NFR BLD: 3 %
ERYTHROCYTE [DISTWIDTH] IN BLOOD BY AUTOMATED COUNT: 15.2 %
EST. GFR  (AFRICAN AMERICAN): 57.3 ML/MIN/1.73 M^2
EST. GFR  (NON AFRICAN AMERICAN): 49.7 ML/MIN/1.73 M^2
GLUCOSE SERPL-MCNC: 98 MG/DL
HCT VFR BLD AUTO: 36.2 %
HDLC SERPL-MCNC: 59 MG/DL
HDLC SERPL: 35.1 %
HGB BLD-MCNC: 11 G/DL
IMM GRANULOCYTES # BLD AUTO: 0.02 K/UL
IMM GRANULOCYTES NFR BLD AUTO: 0.3 %
LDLC SERPL CALC-MCNC: 93.2 MG/DL
LYMPHOCYTES # BLD AUTO: 1.8 K/UL
LYMPHOCYTES NFR BLD: 24 %
MCH RBC QN AUTO: 25.9 PG
MCHC RBC AUTO-ENTMCNC: 30.4 G/DL
MCV RBC AUTO: 85 FL
MONOCYTES # BLD AUTO: 0.7 K/UL
MONOCYTES NFR BLD: 8.7 %
NEUTROPHILS # BLD AUTO: 4.8 K/UL
NEUTROPHILS NFR BLD: 63.3 %
NONHDLC SERPL-MCNC: 109 MG/DL
NRBC BLD-RTO: 0 /100 WBC
PLATELET # BLD AUTO: 151 K/UL
PMV BLD AUTO: 14.7 FL
POTASSIUM SERPL-SCNC: 4.3 MMOL/L
PROT SERPL-MCNC: 6.7 G/DL
RBC # BLD AUTO: 4.24 M/UL
SODIUM SERPL-SCNC: 140 MMOL/L
TRIGL SERPL-MCNC: 79 MG/DL
WBC # BLD AUTO: 7.62 K/UL

## 2018-03-10 PROCEDURE — 36415 COLL VENOUS BLD VENIPUNCTURE: CPT | Mod: PO

## 2018-03-10 PROCEDURE — 82306 VITAMIN D 25 HYDROXY: CPT

## 2018-03-10 PROCEDURE — 85025 COMPLETE CBC W/AUTO DIFF WBC: CPT

## 2018-03-10 PROCEDURE — 80061 LIPID PANEL: CPT

## 2018-03-10 PROCEDURE — 80053 COMPREHEN METABOLIC PANEL: CPT

## 2018-03-12 ENCOUNTER — OFFICE VISIT (OUTPATIENT)
Dept: FAMILY MEDICINE | Facility: CLINIC | Age: 83
End: 2018-03-12
Payer: MEDICARE

## 2018-03-12 VITALS
BODY MASS INDEX: 18.95 KG/M2 | WEIGHT: 113.75 LBS | SYSTOLIC BLOOD PRESSURE: 139 MMHG | DIASTOLIC BLOOD PRESSURE: 67 MMHG | HEART RATE: 78 BPM | HEIGHT: 65 IN | TEMPERATURE: 98 F

## 2018-03-12 DIAGNOSIS — I10 ESSENTIAL HYPERTENSION: ICD-10-CM

## 2018-03-12 DIAGNOSIS — L30.9 ECZEMA, UNSPECIFIED TYPE: Primary | ICD-10-CM

## 2018-03-12 DIAGNOSIS — E78.2 MIXED HYPERLIPIDEMIA: ICD-10-CM

## 2018-03-12 DIAGNOSIS — D69.6 THROMBOCYTOPENIA: ICD-10-CM

## 2018-03-12 DIAGNOSIS — N18.30 CHRONIC KIDNEY DISEASE, STAGE III (MODERATE): ICD-10-CM

## 2018-03-12 DIAGNOSIS — E55.9 HYPOVITAMINOSIS D: ICD-10-CM

## 2018-03-12 PROCEDURE — 99214 OFFICE O/P EST MOD 30 MIN: CPT | Mod: S$PBB,,, | Performed by: FAMILY MEDICINE

## 2018-03-12 PROCEDURE — 99213 OFFICE O/P EST LOW 20 MIN: CPT | Mod: PBBFAC,PO | Performed by: FAMILY MEDICINE

## 2018-03-12 PROCEDURE — 99999 PR PBB SHADOW E&M-EST. PATIENT-LVL III: CPT | Mod: PBBFAC,,, | Performed by: FAMILY MEDICINE

## 2018-03-12 RX ORDER — TRIAMCINOLONE ACETONIDE 1 MG/G
CREAM TOPICAL 2 TIMES DAILY
Qty: 80 G | Refills: 0 | Status: SHIPPED | OUTPATIENT
Start: 2018-03-12 | End: 2020-01-01

## 2018-03-12 NOTE — PROGRESS NOTES
Subjective:       Patient ID: Melody Dupont is a 89 y.o. female.    Chief Complaint: Follow-up    Patient Active Problem List   Diagnosis    History of TIA (transient ischemic attack)    HTN (hypertension), onset 2014    Mixed hyperlipidemia, baseline LDL not available    Gastroesophageal reflux disease without esophagitis    Rheumatoid arthritis involving multiple sites with positive rheumatoid factor, onset 1972    BMI less than 19,adult    Underweight    Chronic kidney disease, stage III (moderate)    Carotid disease, bilateral    Abdominal aortic atherosclerosis    Malnutrition    Thrombocytopenia    Anemia    Hypovitaminosis D    Encounter for monitoring leflunomide therapy    Osteoporosis    Nonrheumatic aortic valve stenosis    RLS (restless legs syndrome)    Polycystic kidney disease    Excessive daytime sleepiness    Bilateral carotid bruits    Dysphagia    GERD (gastroesophageal reflux disease)     HPI  Review of Systems   Constitutional: Negative for fatigue and unexpected weight change.   Respiratory: Negative for chest tightness and shortness of breath.    Cardiovascular: Negative for chest pain, palpitations and leg swelling.   Gastrointestinal: Negative for abdominal pain.   Musculoskeletal: Negative for arthralgias.   Neurological: Negative for dizziness, syncope, light-headedness and headaches.       Objective:      Physical Exam   Constitutional: She is oriented to person, place, and time. She appears well-developed and well-nourished.   Cardiovascular: Normal rate, regular rhythm and normal heart sounds.    Pulmonary/Chest: Effort normal and breath sounds normal.   Musculoskeletal: She exhibits no edema.   Neurological: She is alert and oriented to person, place, and time.   Skin: Skin is warm and dry.   Psychiatric: She has a normal mood and affect.   Nursing note and vitals reviewed.      Assessment:       1. Eczema, unspecified type    2. Essential hypertension     3. Mixed hyperlipidemia, baseline LDL not available    4. Chronic kidney disease, stage III (moderate)    5. Thrombocytopenia    6. Hypovitaminosis D        Plan:       1. Eczema, unspecified type  USe in affected area prn  - triamcinolone acetonide 0.1% (KENALOG) 0.1 % cream; Apply topically 2 (two) times daily.  Dispense: 80 g; Refill: 0    2. Essential hypertension  Controlled on current medications.  Continue current medications.      3. Mixed hyperlipidemia, baseline LDL not available  Stable condition.  Continue current medications.  Will adjust based on lab findings or if condition changes.    - Comprehensive metabolic panel; Future  - Lipid panel; Future    4. Chronic kidney disease, stage III (moderate)  Stable and chronic.  Will continue to monitor q3-6 months and control chronic conditions as optimally as possible to preserve function.    - CBC auto differential; Future    5. Thrombocytopenia  Stable.  Cont to monitor    6. Hypovitaminosis D  Screen and treat as indicated:    - Vitamin D; Future

## 2018-04-22 DIAGNOSIS — E78.2 MIXED HYPERLIPIDEMIA: ICD-10-CM

## 2018-04-22 DIAGNOSIS — R09.89 BILATERAL CAROTID BRUITS: ICD-10-CM

## 2018-04-22 DIAGNOSIS — Z86.73 HISTORY OF TIA (TRANSIENT ISCHEMIC ATTACK): ICD-10-CM

## 2018-04-23 RX ORDER — PRAVASTATIN SODIUM 10 MG/1
10 TABLET ORAL DAILY
Qty: 90 TABLET | Refills: 0 | Status: SHIPPED | OUTPATIENT
Start: 2018-04-23 | End: 2018-09-12 | Stop reason: SDUPTHER

## 2018-05-21 ENCOUNTER — DOCUMENTATION ONLY (OUTPATIENT)
Dept: FAMILY MEDICINE | Facility: CLINIC | Age: 83
End: 2018-05-21

## 2018-05-21 ENCOUNTER — TELEPHONE (OUTPATIENT)
Dept: FAMILY MEDICINE | Facility: CLINIC | Age: 83
End: 2018-05-21

## 2018-05-21 DIAGNOSIS — R35.0 FREQUENCY OF URINATION: Primary | ICD-10-CM

## 2018-05-21 NOTE — PROGRESS NOTES
Pre-Visit Chart Review  For Appointment Scheduled on 05/21/18    There are no preventive care reminders to display for this patient.

## 2018-05-21 NOTE — TELEPHONE ENCOUNTER
Patient was on hema schedule today.   Spoke to patient notified that hema is out of the office this evening can you please sign or for ua and culture patient is complaining of ua and culture

## 2018-05-22 ENCOUNTER — TELEPHONE (OUTPATIENT)
Dept: FAMILY MEDICINE | Facility: CLINIC | Age: 83
End: 2018-05-22

## 2018-05-22 ENCOUNTER — APPOINTMENT (OUTPATIENT)
Dept: LAB | Facility: HOSPITAL | Age: 83
End: 2018-05-22
Attending: FAMILY MEDICINE
Payer: MEDICARE

## 2018-05-22 NOTE — TELEPHONE ENCOUNTER
----- Message from Vanita Hagen sent at 5/22/2018  2:24 PM CDT -----  Type:  Test Results    Who Called:  Melody  Name of Test (Lab/Mammo/Etc):  U/A for UTI  Date of Test: 5/22/18  Ordering Provider:  Inder  Where the test was performed:  [facility]  Best Call Back Number:  [phone]    [facility]/pharmacy #[id number]   [address] [address]  Phone: [phone] Fax: [phone]  Additional Information: Please advise, Thanks

## 2018-05-23 ENCOUNTER — TELEPHONE (OUTPATIENT)
Dept: FAMILY MEDICINE | Facility: CLINIC | Age: 83
End: 2018-05-23

## 2018-05-23 NOTE — TELEPHONE ENCOUNTER
----- Message from Gloria Aragon sent at 5/23/2018  9:57 AM CDT -----  Contact: self  Type: Needs Medical Advice    Who Called:  Melody Dupont Symptoms (please be specific):    How long has patient had these symptoms:  NA   Pharmacy name and phone #:    Best Call Back Number: [phone]  Additional Information: Patient called asking for the status for a new rx for uti.[facility]

## 2018-05-23 NOTE — TELEPHONE ENCOUNTER
Spoke with the patient to advise that we are waiting on the culture portion of her test to know which ABT to initiate for her. I did advise that it could be later today or tomorrow am before we know. She wants medication sent in to [facility] on [address]. That's what is on her chart.

## 2018-05-24 ENCOUNTER — OFFICE VISIT (OUTPATIENT)
Dept: ORTHOPEDICS | Facility: CLINIC | Age: 83
End: 2018-05-24
Payer: MEDICARE

## 2018-05-24 VITALS
HEIGHT: 65 IN | WEIGHT: 113 LBS | DIASTOLIC BLOOD PRESSURE: 76 MMHG | SYSTOLIC BLOOD PRESSURE: 130 MMHG | BODY MASS INDEX: 18.83 KG/M2 | HEART RATE: 81 BPM

## 2018-05-24 DIAGNOSIS — M17.11 PRIMARY OSTEOARTHRITIS OF RIGHT KNEE: ICD-10-CM

## 2018-05-24 DIAGNOSIS — M25.512 LEFT SHOULDER PAIN: ICD-10-CM

## 2018-05-24 DIAGNOSIS — M19.012 ARTHROPATHY OF LEFT SHOULDER: ICD-10-CM

## 2018-05-24 DIAGNOSIS — M25.561 RIGHT KNEE PAIN: Primary | ICD-10-CM

## 2018-05-24 PROCEDURE — 20610 DRAIN/INJ JOINT/BURSA W/O US: CPT | Mod: RT,,, | Performed by: ORTHOPAEDIC SURGERY

## 2018-05-24 PROCEDURE — 99213 OFFICE O/P EST LOW 20 MIN: CPT | Mod: 25,,, | Performed by: ORTHOPAEDIC SURGERY

## 2018-05-24 PROCEDURE — 20610 DRAIN/INJ JOINT/BURSA W/O US: CPT | Mod: 51,LT,, | Performed by: ORTHOPAEDIC SURGERY

## 2018-05-24 PROCEDURE — 73030 X-RAY EXAM OF SHOULDER: CPT | Mod: LT,,, | Performed by: ORTHOPAEDIC SURGERY

## 2018-05-24 PROCEDURE — 73562 X-RAY EXAM OF KNEE 3: CPT | Mod: RT,,, | Performed by: ORTHOPAEDIC SURGERY

## 2018-05-24 RX ADMIN — METHYLPREDNISOLONE ACETATE 40 MG: 40 INJECTION, SUSPENSION INTRA-ARTICULAR; INTRALESIONAL; INTRAMUSCULAR; SOFT TISSUE at 05:05

## 2018-05-24 NOTE — PROGRESS NOTES
[facility]    Subjective:     Chief Complaint:   Chief Complaint   Patient presents with    Right Knee - Pain     Right knee pain injection follow up. States that her knee is getting worse. Hard for her to get up and move around with it.     Left Shoulder - Pain     Left shoulder pain x 1 month. States that she had an injection in that shoulder and she just started to bother her again.        Past Medical History:   Diagnosis Date    Anemia     Cataract     Disorder of kidney and ureter     Hyperlipidemia     Hypertension     Malnutrition     Osteoporosis     PUD (peptic ulcer disease)     Restless leg syndrome     Rheumatoid arthritis     TIA (transient ischemic attack) 1/23/12       Past Surgical History:   Procedure Laterality Date    ADENOIDECTOMY      FRACTURE SURGERY      left hip ORIF    HAND SURGERY      HYSTERECTOMY      complete    TONSILLECTOMY         Current Outpatient Prescriptions   Medication Sig    aspirin (ECOTRIN) 81 MG EC tablet Take 162 mg by mouth once daily. 2 at night    clotrimazole (LOTRIMIN) 1 % Crea PLACE VAGINALLY EVERY EVENING AS DIRECTED    cyanocobalamin (VITAMIN B-12) 1000 MCG tablet Take 100 mcg by mouth once daily.    docusate sodium (COLACE) 100 MG capsule Take 100 mg by mouth 2 (two) times daily.    ergocalciferol (VITAMIN D2) 50,000 unit Cap Take 1 capsule (50,000 Units total) by mouth every 7 days.    fluticasone (FLONASE) 50 mcg/actuation nasal spray 1 spray (50 mcg total) by Each Nare route once daily.    lisinopril (PRINIVIL,ZESTRIL) 20 MG tablet Take 1 tablet (20 mg total) by mouth once daily.    nystatin-triamcinolone (MYCOLOG II) cream APPLY TO THE CORNERS OF THE MOUTH 4 TIMES A DAY    pantoprazole (PROTONIX) 40 MG tablet Take 1 tablet (40 mg total) by mouth once daily.    pramipexole (MIRAPEX) 0.125 MG tablet Take 0.125 mg by mouth 3 (three) times daily.    pravastatin (PRAVACHOL) 10 MG tablet Take 1 tablet (10 mg total) by mouth  once daily.    pravastatin (PRAVACHOL) 10 MG tablet TAKE 1 TABLET (10 MG TOTAL) BY MOUTH ONCE DAILY.    triamcinolone acetonide 0.1% (KENALOG) 0.1 % cream Apply topically 2 (two) times daily.     No current facility-administered medications for this visit.        Review of patient's allergies indicates:   Allergen Reactions    Antihistamines - alkylamine Other (See Comments)     Other reaction(s): Hallucinations    Hydrocodone Other (See Comments)     Confusion, isolated, abandon    Ibuprofen      Gi bleed, ckd    Plavix [clopidogrel] Hives    Prednisone Hallucinations     Other reaction(s): Hallucinations    Tramadol     Triamcinolone Other (See Comments)     hallucinations    Penicillins Rash     Other reaction(s): Unknown       Family History   Problem Relation Age of Onset    Heart disease Mother     Heart disease Father     Cancer Brother         stomach cancer    No Known Problems Paternal Grandmother     No Known Problems Paternal Grandfather     Cancer Brother         duodenal cancer    Alcohol abuse Brother     Parkinsonism Brother     Alcohol abuse Brother     No Known Problems Son        Social History     Social History    Marital status:      Spouse name: N/A    Number of children: N/A    Years of education: N/A     Occupational History    Not on file.     Social History Main Topics    Smoking status: Never Smoker    Smokeless tobacco: Never Used    Alcohol use No    Drug use: No    Sexual activity: No     Other Topics Concern    Not on file     Social History Narrative    No narrative on file       History of present illness: Patient comes in today for her right knee and her left shoulder. Both are bothering her tremendously.      Review of Systems:    Constitution: Negative for chills, fever, and sweats.  Negative for unexplained weight loss.    HENT:  Negative for headaches and blurry vision.    Cardiovascular:Negative for chest pain or irregular heart beat.  Negative for hypertension.    Respiratory:  Negative for cough and shortness of breath.    Gastrointestinal: Negative for abdominal pain, heartburn, melena, nausea, and vomitting.    Genitourinary:  Negative bladder incontinence and dysuria.    Musculoskeletal:  See HPI for details.     Neurological: Negative for numbness.    Psychiatric/Behavioral: Negative for depression.  The patient is not nervous/anxious.      Endocrine: Negative for polyuria    Hematologic/Lymphatic: Negative for bleeding problem.  Does not bruise/bleed easily.    Skin: Negative for poor would healing and rash    Objective:      Physical Examination:    Vital Signs:    Vitals:    05/24/18 1647   BP: 130/76   Pulse: 81       Body mass index is 18.8 kg/m².    This a well-developed, well nourished patient in no acute distress.  They are alert and oriented and cooperative to examination.        Patient has limited range of motion of the left shoulder. It's very painful for her to even move it away from her body. Her left knee has range of motion from 0-100 degrees. Her right knee has range of motion from 10-70 degrees  Pertinent New Results:    XRAY Report / Interpretation:   AP lateral and oblique of her right knee demonstrate severe bone-on-bone osteoarthrosis.  AP and lateral the left shoulder demonstrates severe shoulder arthropathy    Assessment/Plan:      Shoulder arthropathy and right knee arthrosis. I injected the right knee with Depo-Medrol and lidocaine. I injected the left shoulder with Depo-Medrol and lidocaine. She will follow-up when necessary      This note was created using Dragon voice recognition software that occasionally misinterpreted phrases or words.

## 2018-05-24 NOTE — PROCEDURES
Large Joint Aspiration/Injection  Date/Time: 5/24/2018 5:08 PM  Performed by: GINA FRIAS  Authorized by: GINA FRIAS     Consent Done?:  Yes (Verbal)  Indications:  Pain  Procedure site marked: Yes    Timeout: Prior to procedure the correct patient, procedure, and site was verified      Location:  Knee  Site:  R knee  Prep: Patient was prepped and draped in usual sterile fashion    Needle size:  25 G  Medications:  40 mg methylPREDNISolone acetate 40 mg/mL; 40 mg methylPREDNISolone acetate 40 mg/mL  Patient tolerance:  Patient tolerated the procedure well with no immediate complications  Large Joint Aspiration/Injection  Date/Time: 5/24/2018 5:08 PM  Performed by: GINA FRIAS  Authorized by: GINA FRIAS     Consent Done?:  Yes (Verbal)  Indications:  Pain  Procedure site marked: Yes    Timeout: Prior to procedure the correct patient, procedure, and site was verified      Location:  Shoulder  Site:  L subacromial bursa  Prep: Patient was prepped and draped in usual sterile fashion    Needle size:  25 G  Medications:  40 mg methylPREDNISolone acetate 40 mg/mL; 40 mg methylPREDNISolone acetate 40 mg/mL  Patient tolerance:  Patient tolerated the procedure well with no immediate complications

## 2018-05-30 ENCOUNTER — OFFICE VISIT (OUTPATIENT)
Dept: UROLOGY | Facility: CLINIC | Age: 83
End: 2018-05-30
Payer: MEDICARE

## 2018-05-30 VITALS
HEIGHT: 65 IN | WEIGHT: 114 LBS | HEART RATE: 77 BPM | BODY MASS INDEX: 18.99 KG/M2 | TEMPERATURE: 98 F | SYSTOLIC BLOOD PRESSURE: 136 MMHG | DIASTOLIC BLOOD PRESSURE: 66 MMHG

## 2018-05-30 DIAGNOSIS — R32 URINARY INCONTINENCE, UNSPECIFIED TYPE: ICD-10-CM

## 2018-05-30 DIAGNOSIS — R35.0 URINARY FREQUENCY: Primary | ICD-10-CM

## 2018-05-30 LAB
BILIRUB SERPL-MCNC: ABNORMAL MG/DL
BLOOD URINE, POC: ABNORMAL
COLOR, POC UA: ABNORMAL
GLUCOSE UR QL STRIP: ABNORMAL
KETONES UR QL STRIP: ABNORMAL
LEUKOCYTE ESTERASE URINE, POC: ABNORMAL
NITRITE, POC UA: ABNORMAL
PH, POC UA: 6
PROTEIN, POC: ABNORMAL
SPECIFIC GRAVITY, POC UA: 1015
UROBILINOGEN, POC UA: ABNORMAL

## 2018-05-30 PROCEDURE — 99999 PR PBB SHADOW E&M-EST. PATIENT-LVL V: CPT | Mod: PBBFAC,,, | Performed by: NURSE PRACTITIONER

## 2018-05-30 PROCEDURE — 99215 OFFICE O/P EST HI 40 MIN: CPT | Mod: PBBFAC,PN | Performed by: NURSE PRACTITIONER

## 2018-05-30 PROCEDURE — 87086 URINE CULTURE/COLONY COUNT: CPT

## 2018-05-30 PROCEDURE — 81001 URINALYSIS AUTO W/SCOPE: CPT | Mod: PBBFAC,PN | Performed by: NURSE PRACTITIONER

## 2018-05-30 PROCEDURE — 99214 OFFICE O/P EST MOD 30 MIN: CPT | Mod: S$PBB,ICN,, | Performed by: NURSE PRACTITIONER

## 2018-05-30 RX ORDER — FLUCONAZOLE 150 MG/1
150 TABLET ORAL DAILY
Qty: 5 TABLET | Refills: 0 | Status: SHIPPED | OUTPATIENT
Start: 2018-05-30 | End: 2018-06-04

## 2018-05-30 NOTE — PROGRESS NOTES
[facility] Note  Staff: REBECCA Nix-C    PCP: Dr. Lady Loving    Chief Complaint: Urinary incontinence/frequency    Subjective:        HPI: Melody Dupont is a 89 y.o. female NEW PATIENT to Urology clinic today presents with new onset urinary frequency and incontinence with vaginal itching x one week.  Pt states today that prior to onset she has never had any Urology issues in her past.  She currently denies hematuria, dysuria, and when she feels the urge to urinate she immediately goes to her bedside commode chair.  Pt feels that she empties completely with each toilet visit.  Pt is accompanied today by her sitter.    Fam Hx:   malignancies: No    Family hx of severe heart problems  Brother  of stomach cancer.    REVIEW OF SYSTEMS:  Review of Systems   Constitutional: Negative for chills, diaphoresis, fever and weight loss.   HENT: Negative for congestion, hearing loss, nosebleeds and sore throat.    Eyes: Negative for blurred vision and pain.   Respiratory: Negative for cough and wheezing.    Cardiovascular: Negative for chest pain, palpitations and leg swelling.   Gastrointestinal: Negative for abdominal pain, heartburn, nausea and vomiting.   Genitourinary: Positive for frequency and urgency. Negative for dysuria, flank pain and hematuria.        +Incontinence   Musculoskeletal: Positive for myalgias. Negative for back pain, joint pain and neck pain.        In wheelchair   Skin: Negative for itching and rash.   Neurological: Negative for dizziness, tremors, sensory change, seizures, loss of consciousness, weakness and headaches.        Hx of TIA     Endo/Heme/Allergies: Does not bruise/bleed easily.        Hx of RA  Osteoporosis  Anemia   Psychiatric/Behavioral: Negative for depression and suicidal ideas. The patient is not nervous/anxious.      PMHx:  Past Medical History:   Diagnosis Date    Anemia     Cataract     Disorder of kidney and ureter     Hyperlipidemia      Hypertension     Malnutrition     Osteoporosis     PUD (peptic ulcer disease)     Restless leg syndrome     Rheumatoid arthritis     TIA (transient ischemic attack) 1/23/12     PSHx:  Past Surgical History:   Procedure Laterality Date    ADENOIDECTOMY      FRACTURE SURGERY      left hip ORIF    HAND SURGERY      HYSTERECTOMY      complete    TONSILLECTOMY       Social History     Social History    Marital status:      Spouse name: N/A    Number of children: N/A    Years of education: N/A     Social History Main Topics    Smoking status: Never Smoker    Smokeless tobacco: Never Used    Alcohol use No    Drug use: No    Sexual activity: No     Other Topics Concern    None     Social History Narrative    None     Allergies:  Antihistamines - alkylamine; Hydrocodone; Ibuprofen; Plavix [clopidogrel]; Prednisone; Tramadol; Triamcinolone; and Penicillins    Medications: reviewed   Anticoagulation: Yes -Ecotrin 81 mg one tablet daily.    Objective:     Vitals:    05/30/18 0843   BP: 136/66   Pulse: 77   Temp: 97.9 °F (36.6 °C)     Physical Exam   Vitals reviewed.  Constitutional: She is oriented to person, place, and time. She appears well-developed and well-nourished.   HENT:   Head: Normocephalic and atraumatic.   Eyes: Conjunctivae and EOM are normal. Pupils are equal, round, and reactive to light.   Neck: Normal range of motion. Neck supple.   Cardiovascular: Normal rate, regular rhythm, normal heart sounds and intact distal pulses.    Pulmonary/Chest: Effort normal and breath sounds normal.   Abdominal: Soft. Bowel sounds are normal.   Musculoskeletal:   Limited ROM, weak lower extremities.   Neurological: She is alert and oriented to person, place, and time. She has normal reflexes.   Skin: Skin is warm and dry.     Psychiatric: She has a normal mood and affect. Her behavior is normal. Judgment and thought content normal.     LABS REVIEW:  UA today: Abnormal    Cr:   Lab Results    Component Value Date    CREATININE 1.0 03/10/2018     Assessment:       1. Urinary frequency    2. Urinary incontinence, unspecified type          Plan:     Urine culture to be performed.  Diflucan x 3-5 doses prescribed to pt today.    F/u:  If culture results return as negative findings, may consider further f/up with renal us     [facility]: Inactive    Shaina Gardner, JENNP-C

## 2018-05-31 LAB — BACTERIA UR CULT: NORMAL

## 2018-06-12 ENCOUNTER — TELEPHONE (OUTPATIENT)
Dept: FAMILY MEDICINE | Facility: CLINIC | Age: 83
End: 2018-06-12

## 2018-06-12 ENCOUNTER — OFFICE VISIT (OUTPATIENT)
Dept: FAMILY MEDICINE | Facility: CLINIC | Age: 83
End: 2018-06-12
Payer: MEDICARE

## 2018-06-12 VITALS
TEMPERATURE: 98 F | HEART RATE: 72 BPM | HEIGHT: 65 IN | SYSTOLIC BLOOD PRESSURE: 136 MMHG | DIASTOLIC BLOOD PRESSURE: 70 MMHG

## 2018-06-12 DIAGNOSIS — N18.30 CHRONIC KIDNEY DISEASE, STAGE III (MODERATE): ICD-10-CM

## 2018-06-12 DIAGNOSIS — K52.9 GASTROENTERITIS: ICD-10-CM

## 2018-06-12 DIAGNOSIS — R19.7 DIARRHEA, UNSPECIFIED TYPE: ICD-10-CM

## 2018-06-12 DIAGNOSIS — I10 ESSENTIAL HYPERTENSION: ICD-10-CM

## 2018-06-12 DIAGNOSIS — K21.9 GASTROESOPHAGEAL REFLUX DISEASE WITHOUT ESOPHAGITIS: ICD-10-CM

## 2018-06-12 DIAGNOSIS — R11.0 NAUSEA: Primary | ICD-10-CM

## 2018-06-12 PROCEDURE — 99214 OFFICE O/P EST MOD 30 MIN: CPT | Mod: PBBFAC,PO | Performed by: NURSE PRACTITIONER

## 2018-06-12 PROCEDURE — 99214 OFFICE O/P EST MOD 30 MIN: CPT | Mod: S$PBB,,, | Performed by: NURSE PRACTITIONER

## 2018-06-12 PROCEDURE — 99999 PR PBB SHADOW E&M-EST. PATIENT-LVL IV: CPT | Mod: PBBFAC,,, | Performed by: NURSE PRACTITIONER

## 2018-06-12 RX ORDER — ONDANSETRON 4 MG/1
4 TABLET, ORALLY DISINTEGRATING ORAL EVERY 6 HOURS PRN
Qty: 30 TABLET | Refills: 0 | Status: SHIPPED | OUTPATIENT
Start: 2018-06-12 | End: 2020-01-01

## 2018-06-12 NOTE — PROGRESS NOTES
Subjective:       Patient ID: Melody Dupont is a 89 y.o. female.    Chief Complaint: Nausea and Diarrhea    HPI   Chief Complaint  Chief Complaint   Patient presents with    Nausea    Diarrhea       HPI  Melody Dupont is a 89 y.o. female with medical diagnoses as listed in the medical history and problem list that presents with c/o of nausea and diarrhea.  Patient has had diarrhea since Sunday, none on Monday during the day, then 2 stools during the night last night.  Nausea started at 2 am today and has not had any vomiting.  Denies sick contacts and has not been outside of her home recently. Patient has been able to eat and drink today, cream of wheat, coffee, some water, orange soda, soup 1/4 can, and one can of boost, some oranges. This is consistent with her normal daily intake.  Denies abdominal pain.  Has a lot of gas, small amount of burping.  Denies black stool and blood in the stool.  Has taken some Tums, Pepto Bismol today without relief of nausea.  Patient is passing urine in the usual amount. Nausea symptom is constant since 2 am today. Patient has hypertension and blood pressure is 136/70. Patient refused to weigh today due to weakness and feeling poorly. Her BMI at her last visit was 18.97.  She is in a wheelchair and accompanied by a family member. Established patient with last clinic appointment 3/12/2018.        PAST MEDICAL HISTORY:  Past Medical History:   Diagnosis Date    Anemia     Cataract     Disorder of kidney and ureter     Hyperlipidemia     Hypertension     Malnutrition     Osteoporosis     PUD (peptic ulcer disease)     Restless leg syndrome     Rheumatoid arthritis     TIA (transient ischemic attack) 1/23/12       PAST SURGICAL HISTORY:  Past Surgical History:   Procedure Laterality Date    ADENOIDECTOMY      FRACTURE SURGERY      left hip ORIF    HAND SURGERY      HYSTERECTOMY      complete    TONSILLECTOMY         SOCIAL HISTORY:  Social History      Social History    Marital status:      Spouse name: N/A    Number of children: N/A    Years of education: N/A     Occupational History    Not on file.     Social History Main Topics    Smoking status: Never Smoker    Smokeless tobacco: Never Used    Alcohol use No    Drug use: No    Sexual activity: No     Other Topics Concern    Not on file     Social History Narrative    No narrative on file       FAMILY HISTORY:  Family History   Problem Relation Age of Onset    Heart disease Mother     Heart disease Father     Cancer Brother         stomach cancer    No Known Problems Paternal Grandmother     No Known Problems Paternal Grandfather     Cancer Brother         duodenal cancer    Alcohol abuse Brother     Parkinsonism Brother     Alcohol abuse Brother     No Known Problems Son        ALLERGIES AND MEDICATIONS: updated and reviewed.  Review of patient's allergies indicates:   Allergen Reactions    Antihistamines - alkylamine Other (See Comments)     Other reaction(s): Hallucinations    Hydrocodone Other (See Comments)     Confusion, isolated, abandon    Ibuprofen      Gi bleed, ckd    Plavix [clopidogrel] Hives    Prednisone Hallucinations     Other reaction(s): Hallucinations    Tramadol     Triamcinolone Other (See Comments)     hallucinations    Penicillins Rash     Other reaction(s): Unknown     Current Outpatient Prescriptions   Medication Sig Dispense Refill    aspirin (ECOTRIN) 81 MG EC tablet Take 162 mg by mouth once daily. 2 at night      cyanocobalamin (VITAMIN B-12) 1000 MCG tablet Take 100 mcg by mouth once daily.      docusate sodium (COLACE) 100 MG capsule Take 100 mg by mouth 2 (two) times daily.      fluticasone (FLONASE) 50 mcg/actuation nasal spray 1 spray (50 mcg total) by Each Nare route once daily. 1 Bottle prn    lisinopril (PRINIVIL,ZESTRIL) 20 MG tablet Take 1 tablet (20 mg total) by mouth once daily. 90 tablet 3    pantoprazole (PROTONIX) 40 MG  "tablet Take 1 tablet (40 mg total) by mouth once daily. 90 tablet 3    pravastatin (PRAVACHOL) 10 MG tablet TAKE 1 TABLET (10 MG TOTAL) BY MOUTH ONCE DAILY. 90 tablet 0    ondansetron (ZOFRAN-ODT) 4 MG TbDL Take 1 tablet (4 mg total) by mouth every 6 (six) hours as needed (nausea). 30 tablet 0    triamcinolone acetonide 0.1% (KENALOG) 0.1 % cream Apply topically 2 (two) times daily. 80 g 0     No current facility-administered medications for this visit.      Review of Systems   Constitutional: Positive for appetite change and fatigue. Negative for activity change, chills and fever.        Decreased appetite with nausea, fatigued due to not sleeping well last night   HENT: Negative for congestion, ear pain, postnasal drip, rhinorrhea and sore throat.    Eyes: Negative for visual disturbance.        Vision is good with glasses   Respiratory: Negative for cough, shortness of breath and wheezing.    Cardiovascular: Negative for chest pain, palpitations and leg swelling.   Gastrointestinal: Positive for diarrhea and nausea. Negative for abdominal pain, blood in stool, constipation and vomiting.        Nausea and diarrhea as detailed in HPI.  Denies abdominal pain and has not vomited although did try to make herself vomit in an effort to relieve nausea.    Genitourinary: Negative for difficulty urinating and dysuria.   Musculoskeletal: Positive for arthralgias.        Pain to bilateral hands due to rheumatoid arthritis   Skin: Negative for rash and wound.   Neurological: Positive for dizziness and weakness.        Dizzy when standing up   Psychiatric/Behavioral: Positive for sleep disturbance.        Sleep disturbed last night by nausea, normally sleeps well       Objective:       Vitals:    06/12/18 1832   BP: 136/70   BP Location: Right arm   Patient Position: Sitting   BP Method: Medium (Automatic)   Pulse: 72   Temp: 98 °F (36.7 °C)   TempSrc: Oral   Height: 5' 5" (1.651 m)     Physical Exam   Constitutional: She " is oriented to person, place, and time. Vital signs are normal. She appears well-developed. No distress.   Afebrile with normal blood pressure and pulse. Frail, thin elderly woman   HENT:   Head: Normocephalic and atraumatic.   Mouth/Throat: Oropharynx is clear and moist and mucous membranes are normal.   Mucous membranes moist   Eyes: Conjunctivae and lids are normal. Pupils are equal, round, and reactive to light. Right conjunctiva is not injected. Left conjunctiva is not injected.   Neck: Normal carotid pulses present. Carotid bruit is present.   Bilateral carotid bruit, presumed radiating from heart murmur   Cardiovascular: Normal rate, regular rhythm, S1 normal, S2 normal, intact distal pulses and normal pulses.    Murmur heard.   Systolic murmur is present with a grade of 2/6   Pulses:       Carotid pulses are 2+ on the right side, and 2+ on the left side.       Radial pulses are 2+ on the right side, and 2+ on the left side.        Posterior tibial pulses are 2+ on the right side, and 2+ on the left side.   2/6 systolic murmur to LUSB and RUSB.  No edema   Pulmonary/Chest: Effort normal and breath sounds normal. No respiratory distress. She has no decreased breath sounds. She has no wheezes. She has no rhonchi. She has no rales.   Abdominal: Soft. Normal appearance and bowel sounds are normal. There is no tenderness.   Examined while sitting in wheelchair, soft but slightly protuberant appearing which is normal according to patient and family member.  Bowel sound +x4 with absolutely no pain with palpation   Musculoskeletal: Normal range of motion.   Lymphadenopathy:     She has no cervical adenopathy.   Neurological: She is alert and oriented to person, place, and time.   Skin: Skin is warm, dry and intact. She is not diaphoretic. No pallor.   Skin turgor is normal without tenting, no evidence of dehydration   Psychiatric: She has a normal mood and affect. Her speech is normal and behavior is normal. Judgment  and thought content normal. Cognition and memory are normal.   Nursing note and vitals reviewed.      Assessment:       1. Nausea    2. Diarrhea, unspecified type    3. Gastroenteritis    4. Essential hypertension    5. Gastroesophageal reflux disease without esophagitis    6. Chronic kidney disease, stage III (moderate)    7. BMI less than 19,adult        Plan:   Melody was seen today for nausea and diarrhea.  Advised patient/caregiver that if she develops fever, chills, abdominal pain, excessive diarrhea, vomiting , or inability to keep food and fluids down, go to the emergency room.   Diagnoses and all orders for this visit:    Nausea  -     ondansetron (ZOFRAN-ODT) 4 MG TbDL; Take 1 tablet (4 mg total) by mouth every 6 (six) hours as needed (nausea).  - Discussed clear liquid, bland diet until nausea improves  - Discussed importance of maintaining hydration by continuing to drink liquids    Diarrhea, unspecified type  Comments:  This seems to be resolving, 2 bowel movements during the very early morning hours today that were not loose.  Discussed that pepto bismol may cause black stool    Gastroenteritis   Probable cause of symptoms as patient has no abdominal pain, blood in stool, excessive diarrhea, vomiting at this time   Abdominal exam normal    Essential hypertension  Comments:  Blood pressure controlled 136/70, continue current medication without change    Gastroesophageal reflux disease without esophagitis  Comments:  Some belching with current illness, hx of gastric ulcer, encouraged to continue protonix as prescribed    Chronic kidney disease, stage III (moderate)   GFR 49.7 on 3/10/18, stable   Discussed importance of maintaining hydration   Advised no OTC NSAIDs  BMI less than 19,adult  Comments:  Weight stable, continue to monitor  Continue boost 2 cans daily    Follow-up if symptoms worsen or fail to improve.     Patient readiness: acceptance and barriers:none    During the course of the visit the  patient was educated and counseled about the following:     Hypertension:   Medication: no change.  Dietary sodium restriction.  Regular aerobic exercise.  Underweight:  Nutritional supplements    Goals: Hypertension: Reduce Blood Pressure and Underweight: Increase calorie intake and BMI      Did patient meet goals/outcomes: Yes    The following self management tools provided: declined    Patient Instructions (the written plan) was given to the patient/family.     Time spent with patient: 45 minutes    Barriers to medications present (no )    Adverse reactions to current medications (no)    Over the counter medications reviewed (Yes)

## 2018-06-12 NOTE — TELEPHONE ENCOUNTER
I think it would be best if she made an appointment to come in for evaluation. I can see her tomorrow.

## 2018-06-12 NOTE — TELEPHONE ENCOUNTER
Patient has had nausea since 2 am. Has been taking tums and pepto tabs.  No actual vomiting, no stomach pain, no diarrhea or fever. Wanting some advise or medication. Please advise.

## 2018-06-12 NOTE — TELEPHONE ENCOUNTER
----- Message from Natalia Miranda sent at 6/12/2018  9:52 AM CDT -----  Type: Needs Medical Advice    Who Called:  Patient  Symptoms (please be specific):  Nausea since 2:00am this morning  How long has patient had these symptoms:  Since 2:00am this morning  Pharmacy name and phone #:    [id number] - [place] - [address]  [address] [address]  Phone: [phone] Fax: [phone]  Best Call Back Number: [phone]  Additional Information: Patient took a couple of Tums this morning but it did not help. She feels like it but has not thrown up, yet. Please call to advise.

## 2018-06-13 ENCOUNTER — TELEPHONE (OUTPATIENT)
Dept: FAMILY MEDICINE | Facility: CLINIC | Age: 83
End: 2018-06-13

## 2018-06-13 NOTE — PATIENT INSTRUCTIONS
If you develop fever, chills, abdominal pain, excessive diarrhea, vomiting , or inability to keep food and fluids down, go to the emergency room.

## 2018-06-26 NOTE — TELEPHONE ENCOUNTER
[address]    PATIENT'S NAME: Nohelia Smith   ATTENDING PHYSICIAN: Leta Mittal D.D.S. OPERATING PHYSICIAN: Leta Mittal D.D.S.    PATIENT ACCOUNT#:   [de-identified]    LOCATION:  [address]  MEDICAL RECORD #:   [id number] Spoke with patient about symptoms. She wasn't sure if it is a UTI or just in the vagina. I advised that with what she was describing (itching no discharge, burning, or frequency) it is likely vaginal. Appointment scheduled for her for tomorrow 6/2/17 with SHRUTHI Roberts.    Estimated blood loss was less than 5 mL. Dictated By Trey Mittal D.D.S.  d: 06/26/2018 05:08:46  t: 06/26/2018 13:26:19  [facility] [place] [id number]  [facility] [place]/

## 2018-06-27 RX ORDER — METHYLPREDNISOLONE ACETATE 40 MG/ML
40 INJECTION, SUSPENSION INTRA-ARTICULAR; INTRALESIONAL; INTRAMUSCULAR; SOFT TISSUE
Status: DISCONTINUED | OUTPATIENT
Start: 2018-05-24 | End: 2018-06-27 | Stop reason: HOSPADM

## 2018-07-26 ENCOUNTER — OFFICE VISIT (OUTPATIENT)
Dept: ORTHOPEDICS | Facility: CLINIC | Age: 83
End: 2018-07-26
Payer: MEDICARE

## 2018-07-26 DIAGNOSIS — M17.11 PRIMARY OSTEOARTHRITIS OF RIGHT KNEE: Primary | ICD-10-CM

## 2018-07-26 DIAGNOSIS — M19.012 ARTHROPATHY OF LEFT SHOULDER: ICD-10-CM

## 2018-07-26 PROCEDURE — 99213 OFFICE O/P EST LOW 20 MIN: CPT | Mod: 25,,, | Performed by: ORTHOPAEDIC SURGERY

## 2018-07-26 PROCEDURE — 20610 DRAIN/INJ JOINT/BURSA W/O US: CPT | Mod: 51,LT,, | Performed by: ORTHOPAEDIC SURGERY

## 2018-07-26 RX ORDER — METHYLPREDNISOLONE ACETATE 40 MG/ML
40 INJECTION, SUSPENSION INTRA-ARTICULAR; INTRALESIONAL; INTRAMUSCULAR; SOFT TISSUE
Status: DISCONTINUED | OUTPATIENT
Start: 2018-07-26 | End: 2018-07-26 | Stop reason: HOSPADM

## 2018-07-26 RX ADMIN — METHYLPREDNISOLONE ACETATE 40 MG: 40 INJECTION, SUSPENSION INTRA-ARTICULAR; INTRALESIONAL; INTRAMUSCULAR; SOFT TISSUE at 04:07

## 2018-07-26 NOTE — PROGRESS NOTES
[facility]    Subjective:   Patient returns today for her left shoulder and her right knee. Both are bothering her.  Chief Complaint: No chief complaint on file.      Past Medical History:   Diagnosis Date    Anemia     Cataract     Disorder of kidney and ureter     Hyperlipidemia     Hypertension     Malnutrition     Osteoporosis     PUD (peptic ulcer disease)     Restless leg syndrome     Rheumatoid arthritis     TIA (transient ischemic attack) 1/23/12       Past Surgical History:   Procedure Laterality Date    ADENOIDECTOMY      FRACTURE SURGERY      left hip ORIF    HAND SURGERY      HYSTERECTOMY      complete    TONSILLECTOMY         Current Outpatient Prescriptions   Medication Sig    aspirin (ECOTRIN) 81 MG EC tablet Take 162 mg by mouth once daily. 2 at night    cyanocobalamin (VITAMIN B-12) 1000 MCG tablet Take 100 mcg by mouth once daily.    docusate sodium (COLACE) 100 MG capsule Take 100 mg by mouth 2 (two) times daily.    fluticasone (FLONASE) 50 mcg/actuation nasal spray 1 spray (50 mcg total) by Each Nare route once daily.    lisinopril (PRINIVIL,ZESTRIL) 20 MG tablet Take 1 tablet (20 mg total) by mouth once daily.    ondansetron (ZOFRAN-ODT) 4 MG TbDL Take 1 tablet (4 mg total) by mouth every 6 (six) hours as needed (nausea).    pantoprazole (PROTONIX) 40 MG tablet Take 1 tablet (40 mg total) by mouth once daily.    pravastatin (PRAVACHOL) 10 MG tablet TAKE 1 TABLET (10 MG TOTAL) BY MOUTH ONCE DAILY.    triamcinolone acetonide 0.1% (KENALOG) 0.1 % cream Apply topically 2 (two) times daily.     No current facility-administered medications for this visit.        Review of patient's allergies indicates:   Allergen Reactions    Antihistamines - alkylamine Other (See Comments)     Other reaction(s): Hallucinations    Hydrocodone Other (See Comments)     Confusion, isolated, abandon    Ibuprofen      Gi bleed, ckd    Plavix [clopidogrel] Hives    Prednisone  Hallucinations     Other reaction(s): Hallucinations    Tramadol     Triamcinolone Other (See Comments)     hallucinations    Penicillins Rash     Other reaction(s): Unknown       Family History   Problem Relation Age of Onset    Heart disease Mother     Heart disease Father     Cancer Brother         stomach cancer    No Known Problems Paternal Grandmother     No Known Problems Paternal Grandfather     Cancer Brother         duodenal cancer    Alcohol abuse Brother     Parkinsonism Brother     Alcohol abuse Brother     No Known Problems Son        Social History     Social History    Marital status:      Spouse name: N/A    Number of children: N/A    Years of education: N/A     Occupational History    Not on file.     Social History Main Topics    Smoking status: Never Smoker    Smokeless tobacco: Never Used    Alcohol use No    Drug use: No    Sexual activity: No     Other Topics Concern    Not on file     Social History Narrative    No narrative on file       History of present illness: Patient is having significant left shoulder and right knee pain. She is known osteoarthritis of the right knee and the left shoulder.      Review of Systems:    Constitution: Negative for chills, fever, and sweats.  Negative for unexplained weight loss.    HENT:  Negative for headaches and blurry vision.    Cardiovascular:Negative for chest pain or irregular heart beat. Negative for hypertension.    Respiratory:  Negative for cough and shortness of breath.    Gastrointestinal: Negative for abdominal pain, heartburn, melena, nausea, and vomitting.    Genitourinary:  Negative bladder incontinence and dysuria.    Musculoskeletal:  See HPI for details.     Neurological: Negative for numbness.    Psychiatric/Behavioral: Negative for depression.  The patient is not nervous/anxious.      Endocrine: Negative for polyuria    Hematologic/Lymphatic: Negative for bleeding problem.  Does not bruise/bleed  easily.    Skin: Negative for poor would healing and rash    Objective:      Physical Examination:    Vital Signs:  There were no vitals filed for this visit.    There is no height or weight on file to calculate BMI.    This a well-developed, well nourished patient in no acute distress.  They are alert and oriented and cooperative to examination.        Patient is in a wheelchair. She has very limited range of motion of the left shoulder. She right knee has range of motion from 10-90 degrees  Pertinent New Results:    XRAY Report / Interpretation:   No new XRAYS Today.    Assessment/Plan:      Severe degenerative changes of the left knee and the right shoulder. Both are injected today with Depo-Medrol and lidocaine. She will follow-up when necessary      This note was created using Dragon voice recognition software that occasionally misinterpreted phrases or words.

## 2018-07-26 NOTE — PROCEDURES
Large Joint Aspiration/Injection  Date/Time: 7/26/2018 4:37 PM  Performed by: GINA FRIAS  Authorized by: GINA FRIAS     Consent Done?:  Yes (Verbal)  Indications:  Pain  Procedure site marked: Yes    Timeout: Prior to procedure the correct patient, procedure, and site was verified      Location:  Knee  Site:  R knee  Prep: Patient was prepped and draped in usual sterile fashion    Needle size:  25 G  Medications:  40 mg methylPREDNISolone acetate 40 mg/mL; 40 mg methylPREDNISolone acetate 40 mg/mL  Patient tolerance:  Patient tolerated the procedure well with no immediate complications  Large Joint Aspiration/Injection  Date/Time: 7/26/2018 4:38 PM  Performed by: GINA FRIAS  Authorized by: GINA FRIAS     Consent Done?:  Yes (Verbal)  Indications:  Pain  Procedure site marked: Yes    Timeout: Prior to procedure the correct patient, procedure, and site was verified      Location:  Shoulder  Site:  L subacromial bursa  Prep: Patient was prepped and draped in usual sterile fashion    Needle size:  25 G  Medications:  40 mg methylPREDNISolone acetate 40 mg/mL; 40 mg methylPREDNISolone acetate 40 mg/mL  Patient tolerance:  Patient tolerated the procedure well with no immediate complications

## 2018-09-08 ENCOUNTER — LAB VISIT (OUTPATIENT)
Dept: LAB | Facility: HOSPITAL | Age: 83
End: 2018-09-08
Attending: FAMILY MEDICINE
Payer: MEDICARE

## 2018-09-08 DIAGNOSIS — E55.9 HYPOVITAMINOSIS D: ICD-10-CM

## 2018-09-08 DIAGNOSIS — E78.2 MIXED HYPERLIPIDEMIA: ICD-10-CM

## 2018-09-08 DIAGNOSIS — N18.30 CHRONIC KIDNEY DISEASE, STAGE III (MODERATE): ICD-10-CM

## 2018-09-08 LAB
25(OH)D3+25(OH)D2 SERPL-MCNC: 25 NG/ML
ALBUMIN SERPL BCP-MCNC: 3.7 G/DL
ALP SERPL-CCNC: 60 U/L
ALT SERPL W/O P-5'-P-CCNC: 9 U/L
ANION GAP SERPL CALC-SCNC: 11 MMOL/L
AST SERPL-CCNC: 18 U/L
BASOPHILS # BLD AUTO: 0.05 K/UL
BASOPHILS NFR BLD: 0.6 %
BILIRUB SERPL-MCNC: 0.3 MG/DL
BUN SERPL-MCNC: 24 MG/DL
CALCIUM SERPL-MCNC: 9.9 MG/DL
CHLORIDE SERPL-SCNC: 106 MMOL/L
CHOLEST SERPL-MCNC: 177 MG/DL
CHOLEST/HDLC SERPL: 3.2 {RATIO}
CO2 SERPL-SCNC: 21 MMOL/L
CREAT SERPL-MCNC: 1.1 MG/DL
DIFFERENTIAL METHOD: ABNORMAL
EOSINOPHIL # BLD AUTO: 0.2 K/UL
EOSINOPHIL NFR BLD: 2.1 %
ERYTHROCYTE [DISTWIDTH] IN BLOOD BY AUTOMATED COUNT: 15.5 %
EST. GFR  (AFRICAN AMERICAN): 50.7 ML/MIN/1.73 M^2
EST. GFR  (NON AFRICAN AMERICAN): 44 ML/MIN/1.73 M^2
GLUCOSE SERPL-MCNC: 83 MG/DL
HCT VFR BLD AUTO: 36.8 %
HDLC SERPL-MCNC: 56 MG/DL
HDLC SERPL: 31.6 %
HGB BLD-MCNC: 11.1 G/DL
IMM GRANULOCYTES # BLD AUTO: 0.02 K/UL
IMM GRANULOCYTES NFR BLD AUTO: 0.3 %
LDLC SERPL CALC-MCNC: 104.8 MG/DL
LYMPHOCYTES # BLD AUTO: 2.2 K/UL
LYMPHOCYTES NFR BLD: 28.1 %
MCH RBC QN AUTO: 27.6 PG
MCHC RBC AUTO-ENTMCNC: 30.2 G/DL
MCV RBC AUTO: 92 FL
MONOCYTES # BLD AUTO: 0.8 K/UL
MONOCYTES NFR BLD: 10.2 %
NEUTROPHILS # BLD AUTO: 4.5 K/UL
NEUTROPHILS NFR BLD: 58.7 %
NONHDLC SERPL-MCNC: 121 MG/DL
NRBC BLD-RTO: 0 /100 WBC
PLATELET # BLD AUTO: 142 K/UL
PMV BLD AUTO: 14.5 FL
POTASSIUM SERPL-SCNC: 4.9 MMOL/L
PROT SERPL-MCNC: 6.8 G/DL
RBC # BLD AUTO: 4.02 M/UL
SODIUM SERPL-SCNC: 138 MMOL/L
TRIGL SERPL-MCNC: 81 MG/DL
WBC # BLD AUTO: 7.73 K/UL

## 2018-09-08 PROCEDURE — 82306 VITAMIN D 25 HYDROXY: CPT

## 2018-09-08 PROCEDURE — 80061 LIPID PANEL: CPT

## 2018-09-08 PROCEDURE — 36415 COLL VENOUS BLD VENIPUNCTURE: CPT | Mod: PO

## 2018-09-08 PROCEDURE — 80053 COMPREHEN METABOLIC PANEL: CPT

## 2018-09-08 PROCEDURE — 85025 COMPLETE CBC W/AUTO DIFF WBC: CPT

## 2018-09-12 ENCOUNTER — OFFICE VISIT (OUTPATIENT)
Dept: FAMILY MEDICINE | Facility: CLINIC | Age: 83
End: 2018-09-12
Payer: MEDICARE

## 2018-09-12 DIAGNOSIS — E78.2 MIXED HYPERLIPIDEMIA: ICD-10-CM

## 2018-09-12 DIAGNOSIS — G25.81 RLS (RESTLESS LEGS SYNDROME): Primary | ICD-10-CM

## 2018-09-12 DIAGNOSIS — K21.9 GASTROESOPHAGEAL REFLUX DISEASE WITHOUT ESOPHAGITIS: ICD-10-CM

## 2018-09-12 DIAGNOSIS — D64.9 ANEMIA, UNSPECIFIED TYPE: ICD-10-CM

## 2018-09-12 DIAGNOSIS — E55.9 HYPOVITAMINOSIS D: ICD-10-CM

## 2018-09-12 DIAGNOSIS — Z86.73 HISTORY OF TIA (TRANSIENT ISCHEMIC ATTACK): ICD-10-CM

## 2018-09-12 DIAGNOSIS — N18.30 CHRONIC KIDNEY DISEASE, STAGE III (MODERATE): ICD-10-CM

## 2018-09-12 DIAGNOSIS — M05.79 RHEUMATOID ARTHRITIS INVOLVING MULTIPLE SITES WITH POSITIVE RHEUMATOID FACTOR: ICD-10-CM

## 2018-09-12 DIAGNOSIS — D69.6 THROMBOCYTOPENIA: ICD-10-CM

## 2018-09-12 DIAGNOSIS — R09.89 BILATERAL CAROTID BRUITS: ICD-10-CM

## 2018-09-12 DIAGNOSIS — I10 ESSENTIAL HYPERTENSION: ICD-10-CM

## 2018-09-12 PROCEDURE — 99214 OFFICE O/P EST MOD 30 MIN: CPT | Mod: S$PBB,,, | Performed by: FAMILY MEDICINE

## 2018-09-12 PROCEDURE — 90662 IIV NO PRSV INCREASED AG IM: CPT | Mod: PBBFAC,PO

## 2018-09-12 PROCEDURE — 99999 PR PBB SHADOW E&M-EST. PATIENT-LVL IV: CPT | Mod: PBBFAC,,, | Performed by: FAMILY MEDICINE

## 2018-09-12 PROCEDURE — 99214 OFFICE O/P EST MOD 30 MIN: CPT | Mod: PBBFAC,PO,25 | Performed by: FAMILY MEDICINE

## 2018-09-12 RX ORDER — ERGOCALCIFEROL 1.25 MG/1
50000 CAPSULE ORAL
Qty: 12 CAPSULE | Refills: 1 | Status: SHIPPED | OUTPATIENT
Start: 2018-09-12 | End: 2019-02-22 | Stop reason: SDUPTHER

## 2018-09-12 RX ORDER — PRAMIPEXOLE DIHYDROCHLORIDE 0.12 MG/1
0.12 TABLET ORAL NIGHTLY PRN
Qty: 90 TABLET | Refills: 3 | Status: SHIPPED | OUTPATIENT
Start: 2018-09-12 | End: 2019-01-01 | Stop reason: SDUPTHER

## 2018-09-12 RX ORDER — PRAVASTATIN SODIUM 10 MG/1
10 TABLET ORAL DAILY
Qty: 90 TABLET | Refills: 3 | Status: SHIPPED | OUTPATIENT
Start: 2018-09-12 | End: 2019-10-04 | Stop reason: SDUPTHER

## 2018-09-12 RX ORDER — LISINOPRIL 20 MG/1
20 TABLET ORAL DAILY
Qty: 90 TABLET | Refills: 3 | Status: SHIPPED | OUTPATIENT
Start: 2018-09-12 | End: 2019-01-01 | Stop reason: SDUPTHER

## 2018-09-12 RX ORDER — PANTOPRAZOLE SODIUM 40 MG/1
40 TABLET, DELAYED RELEASE ORAL DAILY
Qty: 90 TABLET | Refills: 3 | Status: SHIPPED | OUTPATIENT
Start: 2018-09-12 | End: 2019-01-01 | Stop reason: SDUPTHER

## 2018-09-12 NOTE — PATIENT INSTRUCTIONS
OTC benadryl or hydrocortisone cream as needed for itchy skin    Recommend tea tree oil shampoo    Recommend otc desitin zinc oxide cream to vulva and buttocks (avoid mucous membranes) for moisture barrier

## 2018-09-12 NOTE — PROGRESS NOTES
Subjective:       Patient ID: Melody Dupont is a 89 y.o. female.    Chief Complaint: Follow-up (6mth f/u)    HPI  Review of Systems   Constitutional: Negative for fatigue and unexpected weight change.   Respiratory: Negative for chest tightness and shortness of breath.    Cardiovascular: Negative for chest pain, palpitations and leg swelling.   Gastrointestinal: Negative for abdominal pain.   Musculoskeletal: Negative for arthralgias.   Neurological: Negative for dizziness, syncope, light-headedness and headaches.       Patient Active Problem List   Diagnosis    History of TIA (transient ischemic attack)    HTN (hypertension), onset 2014    Mixed hyperlipidemia, baseline LDL not available    Gastroesophageal reflux disease without esophagitis    Rheumatoid arthritis involving multiple sites with positive rheumatoid factor, onset 1972    BMI less than 19,adult    Underweight    Chronic kidney disease, stage III (moderate)    Carotid disease, bilateral    Abdominal aortic atherosclerosis    Malnutrition    Thrombocytopenia    Anemia    Hypovitaminosis D    Encounter for monitoring leflunomide therapy    Osteoporosis    Nonrheumatic aortic valve stenosis    RLS (restless legs syndrome)    Polycystic kidney disease    Excessive daytime sleepiness    Bilateral carotid bruits    Dysphagia    GERD (gastroesophageal reflux disease)     Patient is here for a chronic conditions follow up.    Lab Visit on 09/08/2018   Component Date Value Ref Range Status    Sodium 09/08/2018 138  136 - 145 mmol/L Final    Potassium 09/08/2018 4.9  3.5 - 5.1 mmol/L Final    Chloride 09/08/2018 106  95 - 110 mmol/L Final    CO2 09/08/2018 21* 23 - 29 mmol/L Final    Glucose 09/08/2018 83  70 - 110 mg/dL Final    BUN, Bld 09/08/2018 24  10 - 30 mg/dL Final    Creatinine 09/08/2018 1.1  0.5 - 1.4 mg/dL Final    Calcium 09/08/2018 9.9  8.7 - 10.5 mg/dL Final    Total Protein 09/08/2018 6.8  6.0 - 8.4 g/dL  Final    Albumin 09/08/2018 3.7  3.5 - 5.2 g/dL Final    Total Bilirubin 09/08/2018 0.3  0.1 - 1.0 mg/dL Final    Alkaline Phosphatase 09/08/2018 60  55 - 135 U/L Final    AST 09/08/2018 18  10 - 40 U/L Final    ALT 09/08/2018 9* 10 - 44 U/L Final    Anion Gap 09/08/2018 11  8 - 16 mmol/L Final    eGFR if  09/08/2018 50.7* >60 mL/min/1.73 m^2 Final    eGFR if non African American 09/08/2018 44.0* >60 mL/min/1.73 m^2 Final    WBC 09/08/2018 7.73  3.90 - 12.70 K/uL Final    RBC 09/08/2018 4.02  4.00 - 5.40 M/uL Final    Hemoglobin 09/08/2018 11.1* 12.0 - 16.0 g/dL Final    Hematocrit 09/08/2018 36.8* 37.0 - 48.5 % Final    MCV 09/08/2018 92  82 - 98 fL Final    MCH 09/08/2018 27.6  27.0 - 31.0 pg Final    MCHC 09/08/2018 30.2* 32.0 - 36.0 g/dL Final    RDW 09/08/2018 15.5* 11.5 - 14.5 % Final    Platelets 09/08/2018 142* 150 - 350 K/uL Final    MPV 09/08/2018 14.5* 9.2 - 12.9 fL Final    Immature Granulocytes 09/08/2018 0.3  0.0 - 0.5 % Final    Gran # (ANC) 09/08/2018 4.5  1.8 - 7.7 K/uL Final    Immature Grans (Abs) 09/08/2018 0.02  0.00 - 0.04 K/uL Final    Lymph # 09/08/2018 2.2  1.0 - 4.8 K/uL Final    Mono # 09/08/2018 0.8  0.3 - 1.0 K/uL Final    Eos # 09/08/2018 0.2  0.0 - 0.5 K/uL Final    Baso # 09/08/2018 0.05  0.00 - 0.20 K/uL Final    nRBC 09/08/2018 0  0 /100 WBC Final    Gran% 09/08/2018 58.7  38.0 - 73.0 % Final    Lymph% 09/08/2018 28.1  18.0 - 48.0 % Final    Mono% 09/08/2018 10.2  4.0 - 15.0 % Final    Eosinophil% 09/08/2018 2.1  0.0 - 8.0 % Final    Basophil% 09/08/2018 0.6  0.0 - 1.9 % Final    Differential Method 09/08/2018 Automated   Final    Cholesterol 09/08/2018 177  120 - 199 mg/dL Final    Triglycerides 09/08/2018 81  30 - 150 mg/dL Final    HDL 09/08/2018 56  40 - 75 mg/dL Final    LDL Cholesterol 09/08/2018 104.8  63.0 - 159.0 mg/dL Final    HDL/Chol Ratio 09/08/2018 31.6  20.0 - 50.0 % Final    Total Cholesterol/HDL Ratio 09/08/2018  3.2  2.0 - 5.0 Final    Non-HDL Cholesterol 09/08/2018 121  mg/dL Final    Vit D, 25-Hydroxy 09/08/2018 25* 30 - 96 ng/mL Final   }  Objective:      Physical Exam   Constitutional: She is oriented to person, place, and time. She appears well-developed and well-nourished.   Cardiovascular: Normal rate, regular rhythm and normal heart sounds.   Pulmonary/Chest: Effort normal and breath sounds normal.   Musculoskeletal: She exhibits no edema.   Neurological: She is alert and oriented to person, place, and time.   Skin: Skin is warm and dry.   Psychiatric: She has a normal mood and affect.   Nursing note and vitals reviewed.      Assessment:       1. RLS (restless legs syndrome)    2. Mixed hyperlipidemia, baseline LDL not available    3. History of TIA (transient ischemic attack)    4. Bilateral carotid bruits    5. Gastroesophageal reflux disease without esophagitis    6. Essential hypertension    7. Chronic kidney disease, stage III (moderate)    8. Thrombocytopenia    9. Anemia, unspecified type    10. Hypovitaminosis D    11. Rheumatoid arthritis involving multiple sites with positive rheumatoid factor, onset 1972        Plan:         1. RLS (restless legs syndrome)  Treat  - pramipexole (MIRAPEX) 0.125 MG tablet; Take 1 tablet (0.125 mg total) by mouth nightly as needed (rls).  Dispense: 90 tablet; Refill: 3    2. Mixed hyperlipidemia, baseline LDL not available  Stable condition.  Continue current medications.  Will adjust based on lab findings or if condition changes.    - pravastatin (PRAVACHOL) 10 MG tablet; Take 1 tablet (10 mg total) by mouth once daily.  Dispense: 90 tablet; Refill: 3  - Comprehensive metabolic panel; Future  - Lipid panel; Future    3. History of TIA (transient ischemic attack)  Stable condition.  Continue current medications.  Will adjust based on lab findings or if condition changes.    - pravastatin (PRAVACHOL) 10 MG tablet; Take 1 tablet (10 mg total) by mouth once daily.  Dispense:  90 tablet; Refill: 3    4. Bilateral carotid bruits  Stable condition.  Continue current medications.  Will adjust based on lab findings or if condition changes.    - pravastatin (PRAVACHOL) 10 MG tablet; Take 1 tablet (10 mg total) by mouth once daily.  Dispense: 90 tablet; Refill: 3    5. Gastroesophageal reflux disease without esophagitis  Controlled on current medications.  Continue current medications.    - pantoprazole (PROTONIX) 40 MG tablet; Take 1 tablet (40 mg total) by mouth once daily.  Dispense: 90 tablet; Refill: 3    6. Essential hypertension  I counseled the patient on HTN education, management and recommendations.  I recommended weight loss toward a BMI < 25, avoidance of salt and the DASH diet, regular cardio exercise a minimum of 150 minutes per week and medications if indicated.  Printed materials were given. The goal is < 140/90 unless otherwise specified.      7. Chronic kidney disease, stage III (moderate)  Stable and chronic.  Will continue to monitor q3-6 months and control chronic conditions as optimally as possible to preserve function.      8. Thrombocytopenia  Screen and treat as indicated:    - CBC auto differential; Future    9. Anemia, unspecified type  Screen and treat as indicated:    - CBC auto differential; Future    10. Hypovitaminosis D  Screen and treat as indicated:    - Vitamin D; Future    11. Rheumatoid arthritis involving multiple sites with positive rheumatoid factor, onset 1972  Cont current meds.          Time spent with patient: 20 minutes    Patient with be reevaluated in 6 months or sooner prn    Greater than 50% of this visit was spent counseling as described in above documentation:Yes

## 2018-10-23 ENCOUNTER — OFFICE VISIT (OUTPATIENT)
Dept: ORTHOPEDICS | Facility: CLINIC | Age: 83
End: 2018-10-23
Payer: MEDICARE

## 2018-10-23 VITALS
WEIGHT: 117 LBS | SYSTOLIC BLOOD PRESSURE: 143 MMHG | BODY MASS INDEX: 19.49 KG/M2 | HEIGHT: 65 IN | DIASTOLIC BLOOD PRESSURE: 66 MMHG | HEART RATE: 73 BPM

## 2018-10-23 DIAGNOSIS — M17.11 PRIMARY OSTEOARTHRITIS OF RIGHT KNEE: Primary | ICD-10-CM

## 2018-10-23 DIAGNOSIS — M19.012 ARTHROPATHY OF LEFT SHOULDER: ICD-10-CM

## 2018-10-23 PROCEDURE — 99213 OFFICE O/P EST LOW 20 MIN: CPT | Mod: 25,,, | Performed by: ORTHOPAEDIC SURGERY

## 2018-10-23 PROCEDURE — 20610 DRAIN/INJ JOINT/BURSA W/O US: CPT | Mod: RT,,, | Performed by: ORTHOPAEDIC SURGERY

## 2018-10-23 RX ORDER — METHYLPREDNISOLONE ACETATE 40 MG/ML
40 INJECTION, SUSPENSION INTRA-ARTICULAR; INTRALESIONAL; INTRAMUSCULAR; SOFT TISSUE
Status: DISCONTINUED | OUTPATIENT
Start: 2018-10-23 | End: 2018-10-23 | Stop reason: HOSPADM

## 2018-10-23 RX ADMIN — METHYLPREDNISOLONE ACETATE 40 MG: 40 INJECTION, SUSPENSION INTRA-ARTICULAR; INTRALESIONAL; INTRAMUSCULAR; SOFT TISSUE at 01:10

## 2018-10-23 NOTE — PROCEDURES
Large Joint Aspiration/Injection: R knee  Date/Time: 10/23/2018 1:49 PM  Performed by: Ken Browning MD  Authorized by: Ken Browning MD     Consent Done?:  Yes (Verbal)  Indications:  Pain  Procedure site marked: Yes    Timeout: Prior to procedure the correct patient, procedure, and site was verified      Location:  Knee  Site:  R knee  Prep: Patient was prepped and draped in usual sterile fashion    Needle size:  25 G  Medications:  40 mg methylPREDNISolone acetate 40 mg/mL; 40 mg methylPREDNISolone acetate 40 mg/mL  Patient tolerance:  Patient tolerated the procedure well with no immediate complications

## 2018-10-23 NOTE — PROGRESS NOTES
[facility]    Subjective:     Chief Complaint:   Chief Complaint   Patient presents with    Right Knee - Pain     Right knee pain/injection f/u. States that her knee pain is feeling about the same as before the shot. States that her pain is a 5 in her knee and that it comes and goes.     Left Shoulder - Pain     Left shoulder pain/injection f/u. States that her shoulder is feeling better.        Past Medical History:   Diagnosis Date    Anemia     Cataract     Disorder of kidney and ureter     Hyperlipidemia     Hypertension     Malnutrition     Osteoporosis     PUD (peptic ulcer disease)     Restless leg syndrome     Rheumatoid arthritis     TIA (transient ischemic attack) 1/23/12       Past Surgical History:   Procedure Laterality Date    ADENOIDECTOMY      ESOPHAGOGASTRODUODENOSCOPY (EGD) N/A 6/27/2017    [facility]formed [facility]    ESOPHAGOGASTRODUODENOSCOPY (EGD) N/A 5/11/2017    [facility] by [facility] at [facility]    FRACTURE SURGERY      left hip ORIF    HAND SURGERY      HYSTERECTOMY      complete    TONSILLECTOMY         Current Outpatient Medications   Medication Sig    aspirin (ECOTRIN) 81 MG EC tablet Take 162 mg by mouth once daily. 2 at night    cyanocobalamin (VITAMIN B-12) 1000 MCG tablet Take 100 mcg by mouth once daily.    docusate sodium (COLACE) 100 MG capsule Take 100 mg by mouth 2 (two) times daily.    ergocalciferol (ERGOCALCIFEROL) 50,000 unit Cap Take 1 capsule (50,000 Units total) by mouth every 7 days.    fluticasone (FLONASE) 50 mcg/actuation nasal spray 1 spray (50 mcg total) by Each Nare route once daily.    lisinopril (PRINIVIL,ZESTRIL) 20 MG tablet Take 1 tablet (20 mg total) by mouth once daily.    ondansetron (ZOFRAN-ODT) 4 MG TbDL Take 1 tablet (4 mg total) by mouth every 6 (six) hours as needed (nausea).    pantoprazole (PROTONIX) 40 MG tablet Take 1 tablet (40 mg total) by mouth once daily.    pramipexole  (MIRAPEX) 0.125 MG tablet Take 1 tablet (0.125 mg total) by mouth nightly as needed (rls).    pravastatin (PRAVACHOL) 10 MG tablet Take 1 tablet (10 mg total) by mouth once daily.    triamcinolone acetonide 0.1% (KENALOG) 0.1 % cream Apply topically 2 (two) times daily.     No current facility-administered medications for this visit.        Review of patient's allergies indicates:   Allergen Reactions    Antihistamines - alkylamine Other (See Comments)     Other reaction(s): Hallucinations    Hydrocodone Other (See Comments)     Confusion, isolated, abandon    Ibuprofen      Gi bleed, ckd    Plavix [clopidogrel] Hives    Prednisone Hallucinations     Other reaction(s): Hallucinations    Tramadol     Triamcinolone Other (See Comments)     hallucinations    Penicillins Rash     Other reaction(s): Unknown       Family History   Problem Relation Age of Onset    Heart disease Mother     Heart disease Father     Cancer Brother         stomach cancer    No Known Problems Paternal Grandmother     No Known Problems Paternal Grandfather     Cancer Brother         duodenal cancer    Alcohol abuse Brother     Parkinsonism Brother     Alcohol abuse Brother     No Known Problems Son        Social History     Socioeconomic History    Marital status:      Spouse name: Not on file    Number of children: Not on file    Years of education: Not on file    Highest education level: Not on file   Social Needs    Financial resource strain: Not on file    Food insecurity - worry: Not on file    Food insecurity - inability: Not on file    Transportation needs - medical: Not on file    Transportation needs - non-medical: Not on file   Occupational History    Not on file   Tobacco Use    Smoking status: Never Smoker    Smokeless tobacco: Never Used   Substance and Sexual Activity    Alcohol use: No    Drug use: No    Sexual activity: No   Other Topics Concern    Not on file   Social History Narrative     Not on file       History of present illness: Patient comes in today for her right knee. She's having a lot of achy right knee pain      Review of Systems:    Constitution: Negative for chills, fever, and sweats.  Negative for unexplained weight loss.    HENT:  Negative for headaches and blurry vision.    Cardiovascular:Negative for chest pain or irregular heart beat. Negative for hypertension.    Respiratory:  Negative for cough and shortness of breath.    Gastrointestinal: Negative for abdominal pain, heartburn, melena, nausea, and vomitting.    Genitourinary:  Negative bladder incontinence and dysuria.    Musculoskeletal:  See HPI for details.     Neurological: Negative for numbness.    Psychiatric/Behavioral: Negative for depression.  The patient is not nervous/anxious.      Endocrine: Negative for polyuria    Hematologic/Lymphatic: Negative for bleeding problem.  Does not bruise/bleed easily.    Skin: Negative for poor would healing and rash    Objective:      Physical Examination:    Vital Signs:    Vitals:    10/23/18 1313   BP: (!) 143/66   Pulse: 73       Body mass index is 19.47 kg/m².    This a well-developed, well nourished patient in no acute distress.  They are alert and oriented and cooperative to examination.        Patient has crepitus. She is range of motion from 7-90 degrees. She has a severe valgus deformity  Pertinent New Results:    XRAY Report / Interpretation:   No new XRAYS Today.    Assessment/Plan:      Severe bone-on-bone osteophyte arthritis of the right knee. I injected the right knee with Depo-Medrol and lidocaine. She will follow-up when necessary      This note was created using Dragon voice recognition software that occasionally misinterpreted phrases or words.

## 2018-11-08 ENCOUNTER — TELEPHONE (OUTPATIENT)
Dept: FAMILY MEDICINE | Facility: CLINIC | Age: 83
End: 2018-11-08

## 2018-11-08 NOTE — TELEPHONE ENCOUNTER
----- Message from Barbara Seymour sent at 11/8/2018  1:14 PM CST -----  Type: Needs Medical Advice    Who Called:  Self  Symptoms (please be specific):  Itching in the area of her kidneys   How long has patient had these symptoms:  States she had labs / diagnosed with Stage 3 kidney cancer   Pharmacy name and phone #:     Best Call Back Number: [phone] (home)     Additional Information: would like a referral/recommendation for a kidney specialist in [place]

## 2018-11-09 NOTE — TELEPHONE ENCOUNTER
Itching in kidney area-??  Or pubic??  Needs office visit to discuss.  Does not have kidney cancer on our records.  Has stage 3 chronic kidney disease which does not cause itching typically.  Please make appt

## 2018-11-11 VITALS
HEART RATE: 72 BPM | BODY MASS INDEX: 19.62 KG/M2 | OXYGEN SATURATION: 95 % | WEIGHT: 117.75 LBS | TEMPERATURE: 98 F | RESPIRATION RATE: 12 BRPM | DIASTOLIC BLOOD PRESSURE: 70 MMHG | HEIGHT: 65 IN | SYSTOLIC BLOOD PRESSURE: 133 MMHG

## 2018-11-12 ENCOUNTER — TELEPHONE (OUTPATIENT)
Dept: FAMILY MEDICINE | Facility: CLINIC | Age: 83
End: 2018-11-12

## 2018-11-12 NOTE — TELEPHONE ENCOUNTER
Spoke to patient. Patient states she has kidney disease not cancer. Patient declined appointment. Patient has appointment with a kidney specialist outside of [facility]. Patient states she will schedule appointment if needed

## 2018-11-12 NOTE — TELEPHONE ENCOUNTER
----- Message from Manjula Mcgrath sent at 11/12/2018 10:44 AM CST -----  Contact: self  Patient [phone] is calling/please fax patient's blood work to [facility] at fax [phone]

## 2018-11-12 NOTE — TELEPHONE ENCOUNTER
----- Message from Manjula Mcgrath sent at 11/12/2018 10:44 AM CST -----  Contact: self  Patient [phone] is calling/please fax patient's blood work to [facility] at fax [phone]

## 2019-01-01 ENCOUNTER — OFFICE VISIT (OUTPATIENT)
Dept: FAMILY MEDICINE | Facility: CLINIC | Age: 84
End: 2019-01-01
Payer: MEDICARE

## 2019-01-01 ENCOUNTER — OFFICE VISIT (OUTPATIENT)
Dept: ORTHOPEDICS | Facility: CLINIC | Age: 84
End: 2019-01-01
Payer: MEDICARE

## 2019-01-01 VITALS — BODY MASS INDEX: 19.47 KG/M2 | DIASTOLIC BLOOD PRESSURE: 64 MMHG | SYSTOLIC BLOOD PRESSURE: 96 MMHG | WEIGHT: 117 LBS

## 2019-01-01 DIAGNOSIS — L30.9 DERMATITIS: ICD-10-CM

## 2019-01-01 DIAGNOSIS — R94.6 THYROID FUNCTION TEST ABNORMAL: ICD-10-CM

## 2019-01-01 DIAGNOSIS — Z13.89 SCREENING FOR BLOOD OR PROTEIN IN URINE: ICD-10-CM

## 2019-01-01 DIAGNOSIS — Z79.899 LONG-TERM USE OF HIGH-RISK MEDICATION: ICD-10-CM

## 2019-01-01 DIAGNOSIS — I10 ESSENTIAL HYPERTENSION: Primary | ICD-10-CM

## 2019-01-01 DIAGNOSIS — G25.81 RLS (RESTLESS LEGS SYNDROME): ICD-10-CM

## 2019-01-01 DIAGNOSIS — Z23 FLU VACCINE NEED: ICD-10-CM

## 2019-01-01 DIAGNOSIS — K21.9 GASTROESOPHAGEAL REFLUX DISEASE WITHOUT ESOPHAGITIS: ICD-10-CM

## 2019-01-01 DIAGNOSIS — E55.9 VITAMIN D DEFICIENCY: ICD-10-CM

## 2019-01-01 DIAGNOSIS — M19.012 GLENOHUMERAL ARTHRITIS, LEFT: ICD-10-CM

## 2019-01-01 DIAGNOSIS — Z13.6 SCREENING FOR ISCHEMIC HEART DISEASE (IHD): ICD-10-CM

## 2019-01-01 DIAGNOSIS — Z99.89 WALKER AS AMBULATION AID: ICD-10-CM

## 2019-01-01 DIAGNOSIS — M17.11 PRIMARY OSTEOARTHRITIS OF RIGHT KNEE: Primary | ICD-10-CM

## 2019-01-01 PROCEDURE — 1125F AMNT PAIN NOTED PAIN PRSNT: CPT | Mod: S$GLB,,, | Performed by: ORTHOPAEDIC SURGERY

## 2019-01-01 PROCEDURE — 1159F PR MEDICATION LIST DOCUMENTED IN MEDICAL RECORD: ICD-10-PCS | Mod: S$GLB,,, | Performed by: ORTHOPAEDIC SURGERY

## 2019-01-01 PROCEDURE — 1125F PR PAIN SEVERITY QUANTIFIED, PAIN PRESENT: ICD-10-PCS | Mod: S$GLB,,, | Performed by: ORTHOPAEDIC SURGERY

## 2019-01-01 PROCEDURE — 99212 PR OFFICE/OUTPT VISIT, EST, LEVL II, 10-19 MIN: ICD-10-PCS | Mod: 25,S$GLB,, | Performed by: ORTHOPAEDIC SURGERY

## 2019-01-01 PROCEDURE — 20610 LARGE JOINT ASPIRATION/INJECTION: R KNEE: ICD-10-PCS | Mod: 50,S$GLB,, | Performed by: ORTHOPAEDIC SURGERY

## 2019-01-01 PROCEDURE — 99214 OFFICE O/P EST MOD 30 MIN: CPT | Mod: 25,S$GLB,, | Performed by: FAMILY MEDICINE

## 2019-01-01 PROCEDURE — 1159F MED LIST DOCD IN RCRD: CPT | Mod: S$GLB,,, | Performed by: ORTHOPAEDIC SURGERY

## 2019-01-01 PROCEDURE — 1159F PR MEDICATION LIST DOCUMENTED IN MEDICAL RECORD: ICD-10-PCS | Mod: S$GLB,,, | Performed by: FAMILY MEDICINE

## 2019-01-01 PROCEDURE — 99214 PR OFFICE/OUTPT VISIT, EST, LEVL IV, 30-39 MIN: ICD-10-PCS | Mod: 25,S$GLB,, | Performed by: FAMILY MEDICINE

## 2019-01-01 PROCEDURE — G0008 FLU VACCINE - HIGH DOSE (65+) PRESERVATIVE FREE IM: ICD-10-PCS | Mod: S$GLB,,, | Performed by: FAMILY MEDICINE

## 2019-01-01 PROCEDURE — G0008 ADMIN INFLUENZA VIRUS VAC: HCPCS | Mod: S$GLB,,, | Performed by: FAMILY MEDICINE

## 2019-01-01 PROCEDURE — 99212 OFFICE O/P EST SF 10 MIN: CPT | Mod: 25,S$GLB,, | Performed by: ORTHOPAEDIC SURGERY

## 2019-01-01 PROCEDURE — 90662 FLU VACCINE - HIGH DOSE (65+) PRESERVATIVE FREE IM: ICD-10-PCS | Mod: S$GLB,,, | Performed by: FAMILY MEDICINE

## 2019-01-01 PROCEDURE — 90662 IIV NO PRSV INCREASED AG IM: CPT | Mod: S$GLB,,, | Performed by: FAMILY MEDICINE

## 2019-01-01 PROCEDURE — 20610 DRAIN/INJ JOINT/BURSA W/O US: CPT | Mod: 50,S$GLB,, | Performed by: ORTHOPAEDIC SURGERY

## 2019-01-01 PROCEDURE — 1159F MED LIST DOCD IN RCRD: CPT | Mod: S$GLB,,, | Performed by: FAMILY MEDICINE

## 2019-01-01 RX ORDER — PRAMIPEXOLE DIHYDROCHLORIDE 0.12 MG/1
0.12 TABLET ORAL NIGHTLY PRN
Qty: 90 TABLET | Refills: 3 | Status: SHIPPED | OUTPATIENT
Start: 2019-01-01 | End: 2020-12-15

## 2019-01-01 RX ORDER — PANTOPRAZOLE SODIUM 40 MG/1
40 TABLET, DELAYED RELEASE ORAL DAILY
Qty: 90 TABLET | Refills: 3 | Status: SHIPPED | OUTPATIENT
Start: 2019-01-01 | End: 2020-12-15

## 2019-01-01 RX ORDER — LISINOPRIL 20 MG/1
20 TABLET ORAL DAILY
Qty: 90 TABLET | Refills: 1 | Status: SHIPPED | OUTPATIENT
Start: 2019-01-01 | End: 2020-01-01 | Stop reason: SDUPTHER

## 2019-01-01 RX ORDER — METHYLPREDNISOLONE ACETATE 40 MG/ML
40 INJECTION, SUSPENSION INTRA-ARTICULAR; INTRALESIONAL; INTRAMUSCULAR; SOFT TISSUE
Status: DISCONTINUED | OUTPATIENT
Start: 2019-01-01 | End: 2019-01-01 | Stop reason: HOSPADM

## 2019-01-01 RX ADMIN — METHYLPREDNISOLONE ACETATE 40 MG: 40 INJECTION, SUSPENSION INTRA-ARTICULAR; INTRALESIONAL; INTRAMUSCULAR; SOFT TISSUE at 09:11

## 2019-01-22 ENCOUNTER — OFFICE VISIT (OUTPATIENT)
Dept: ORTHOPEDICS | Facility: CLINIC | Age: 84
End: 2019-01-22
Payer: MEDICARE

## 2019-01-22 VITALS
HEART RATE: 78 BPM | WEIGHT: 106 LBS | SYSTOLIC BLOOD PRESSURE: 148 MMHG | BODY MASS INDEX: 17.66 KG/M2 | HEIGHT: 65 IN | DIASTOLIC BLOOD PRESSURE: 80 MMHG

## 2019-01-22 DIAGNOSIS — M05.721 RHEUMATOID ARTHRITIS INVOLVING RIGHT ELBOW WITH POSITIVE RHEUMATOID FACTOR: Primary | ICD-10-CM

## 2019-01-22 DIAGNOSIS — M17.11 PRIMARY OSTEOARTHRITIS OF RIGHT KNEE: ICD-10-CM

## 2019-01-22 PROCEDURE — 99213 PR OFFICE/OUTPT VISIT, EST, LEVL III, 20-29 MIN: ICD-10-PCS | Mod: 25,,, | Performed by: ORTHOPAEDIC SURGERY

## 2019-01-22 PROCEDURE — 20551 NJX 1 TENDON ORIGIN/INSJ: CPT | Mod: 59,RT,, | Performed by: ORTHOPAEDIC SURGERY

## 2019-01-22 PROCEDURE — 99213 OFFICE O/P EST LOW 20 MIN: CPT | Mod: 25,,, | Performed by: ORTHOPAEDIC SURGERY

## 2019-01-22 PROCEDURE — 73070 PR  X-RAY ELBOW 2 VW: ICD-10-PCS | Mod: RT,,, | Performed by: ORTHOPAEDIC SURGERY

## 2019-01-22 PROCEDURE — 20610 LARGE JOINT ASPIRATION/INJECTION: R KNEE: ICD-10-PCS | Mod: RT,,, | Performed by: ORTHOPAEDIC SURGERY

## 2019-01-22 PROCEDURE — 20551 TENDON ORIGIN: R ELBOW: ICD-10-PCS | Mod: 59,RT,, | Performed by: ORTHOPAEDIC SURGERY

## 2019-01-22 PROCEDURE — 20610 DRAIN/INJ JOINT/BURSA W/O US: CPT | Mod: RT,,, | Performed by: ORTHOPAEDIC SURGERY

## 2019-01-22 PROCEDURE — 73070 X-RAY EXAM OF ELBOW: CPT | Mod: RT,,, | Performed by: ORTHOPAEDIC SURGERY

## 2019-01-22 RX ORDER — METHYLPREDNISOLONE ACETATE 40 MG/ML
40 INJECTION, SUSPENSION INTRA-ARTICULAR; INTRALESIONAL; INTRAMUSCULAR; SOFT TISSUE
Status: DISCONTINUED | OUTPATIENT
Start: 2019-01-22 | End: 2019-01-22 | Stop reason: HOSPADM

## 2019-01-22 RX ADMIN — METHYLPREDNISOLONE ACETATE 40 MG: 40 INJECTION, SUSPENSION INTRA-ARTICULAR; INTRALESIONAL; INTRAMUSCULAR; SOFT TISSUE at 09:01

## 2019-02-07 ENCOUNTER — TELEPHONE (OUTPATIENT)
Dept: FAMILY MEDICINE | Facility: CLINIC | Age: 84
End: 2019-02-07

## 2019-02-07 DIAGNOSIS — R30.0 DYSURIA: Primary | ICD-10-CM

## 2019-02-07 NOTE — TELEPHONE ENCOUNTER
Daughter in law states patient has a possible UTI and she would like to  a specimen up from lab and return the sample for testing. Patients daughter in law came in and picked up a specimen container before speaking with any of our staff. Patients daughter in law is now requesting orders for UA be placed.

## 2019-02-07 NOTE — TELEPHONE ENCOUNTER
----- Message from Natalia Miranda sent at 2/7/2019  1:33 PM CST -----  Type: Needs Medical Advice    Who Called:  Daughter in Law/Pablito Dupont  Best Call Back Number: [phone]  Additional Information: Patient has a possible UTI and she would like to  a specimen up from lab and return the sample for testing. Please call to advise.

## 2019-02-07 NOTE — TELEPHONE ENCOUNTER
----- Message from Aisha Vazquez sent at 2/7/2019  2:24 PM CST -----  Patient daughter in law came to  a urine specimen bottle. But we did not have any orders. Would you be able to put in urine orders for her?

## 2019-02-08 ENCOUNTER — TELEPHONE (OUTPATIENT)
Dept: FAMILY MEDICINE | Facility: CLINIC | Age: 84
End: 2019-02-08

## 2019-02-08 NOTE — TELEPHONE ENCOUNTER
Dr. Loving has not responded to message that was sent yesterday. Attempted to call patient to inform of still waiting for reply. No answer. Left voicemail with call back number.

## 2019-02-08 NOTE — TELEPHONE ENCOUNTER
Spoke with Caron. Informed that patient needs an appointment to come in and be seen. First available appointment is Monday morning. Caron became agitated stating she wanted an appointment for the patient this afternoon. Informed there were no open appointments available. Caron stated that was why she came and picked up a specimine cup and brought back a urine sample from the patient. Informed I understood, but we are unable to just take a urine sample with out an order in. Advised if patient's symptoms become worse to bring patient to urgent care. Caron stated she would and hung up the phone.

## 2019-02-08 NOTE — TELEPHONE ENCOUNTER
----- Message from Angela Tolentino sent at 2/8/2019  1:07 PM CST -----  Contact: Caron schuler  Type:  Test Results    Who Called:  Caron schuler  Name of Test (Lab/Mammo/Etc):  Urine test  Date of Test:  02/07/19  Ordering Provider:  Dr Loving  Where the test was performed:  [facility]  Best Call Back Number:  [phone] please call this number patient is hard of hearing and not able to reach the phone  Additional Information:  Please call daughter in law would like to hear before the weekend so patient does not suffer through the weekend.. Thanks!

## 2019-02-08 NOTE — TELEPHONE ENCOUNTER
----- Message from Jourdan Mosqueda sent at 2/8/2019 11:07 AM CST -----  Contact: pt daughter in law Caron  Type:  Test Results    Who Called:  Caron  Name of Test (Lab/Mammo/Etc):  UA  Date of Test:  2.7.19  Ordering Provider:  Dr Loving  Where the test was performed:  Dr Loving office  Best Call Back Number:  [phone]  Additional Information:  Caron would like to know if an appointment is needed. please call to advise.

## 2019-02-11 NOTE — TELEPHONE ENCOUNTER
Please come in for giving a urine specimen for testing and we will treat once uti confirmed. Monitor for worsening symptoms and return to clinic or go to the ER if these occur: fever > 100.4, severe abdominal pain, intractable vomiting, bleeding from the rectum or black tarry stools, dehydration, lethargy or other worsening symptoms.

## 2019-02-11 NOTE — TELEPHONE ENCOUNTER
"Spoke with patient's daughter-in-law Caron Veronica and she stated that "I tried to get this accomplished on Thursday, but no one responded to me, so I'm not going to do it." "I need someone to respond to me". Told her "I assume that you will be taking her somewhere else then?" Caron said "No, she is fine".   "

## 2019-02-22 DIAGNOSIS — E55.9 HYPOVITAMINOSIS D: Primary | ICD-10-CM

## 2019-02-25 RX ORDER — ERGOCALCIFEROL 1.25 MG/1
CAPSULE ORAL
Qty: 12 CAPSULE | Refills: 1 | Status: SHIPPED | OUTPATIENT
Start: 2019-02-25 | End: 2020-01-01

## 2019-02-25 NOTE — PROGRESS NOTES
Refill Authorization Note     is requesting a refill authorization.    Brief assessment and rationale for refill: APPROVE: prr  Name and strength of medication: VITAMIN D2 50,000 unit capsule       Medication Therapy Plan: HYPOVITAMINOSIS- lco(lov); Labs-wnl; Approve 6 more months     Medication reconciliation completed: No              How patient will take medication: t1c po qw          Comments:   Last Vitamin D (12 months)  Lab Results   Component Value Date    XTJPGQCS57GX 25 (L) 09/08/2018    IPXSZWNW85ZK 38 03/10/2018    PYDDBCIR42TM 41 08/15/2017        Last Calcium (12 months)  Lab Results   Component Value Date    CALCIUM 9.9 09/08/2018    CALCIUM 9.7 03/10/2018    CALCIUM 9.4 08/15/2017        APPOINTMENTS (past 12 m or future 3m authorizing provider)  LAST VISIT DATE  Lady Loving MD 9/12/2018         NEXT VISIT DATE  Lady Loving MD 3/20/2019

## 2019-04-04 NOTE — PROCEDURES
Large Joint Aspiration/Injection: R knee  Date/Time: 1/22/2019 9:31 AM  Performed by: Ken Browning MD  Authorized by: Ken Browning MD     Consent Done?:  Yes (Verbal)  Indications:  Pain  Procedure site marked: Yes    Timeout: Prior to procedure the correct patient, procedure, and site was verified      Location:  Knee  Site:  R knee  Prep: Patient was prepped and draped in usual sterile fashion    Needle size:  25 G  Medications:  40 mg methylPREDNISolone acetate 40 mg/mL; 40 mg methylPREDNISolone acetate 40 mg/mL  Patient tolerance:  Patient tolerated the procedure well with no immediate complications  Tendon Origin: R elbow  Date/Time: 1/22/2019 9:33 AM  Performed by: Ken Browning MD  Authorized by: Ken Browning MD     Consent Done?:  Yes (Verbal)  Timeout: prior to procedure the correct patient, procedure, and site was verified    Indications:  Pain  Site marked: the procedure site was marked    Timeout: prior to procedure the correct patient, procedure, and site was verified    Location:  Elbow  Site:  R elbow  Prep: patient was prepped and draped in usual sterile fashion    Needle size:  25 G  Medications:  40 mg methylPREDNISolone acetate 40 mg/mL  Patient tolerance:  Patient tolerated the procedure well with no immediate complications       Subjective   Patient ID: Fredi is a 32 year old male.    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  Needs refill on his medication taking daily and tolerating well and denies any suicidal or homicidal ideation.        Patient's medications, allergies, past medical, surgical, social and family histories were reviewed and updated as appropriate.    Review of Systems   Constitutional: Negative.    HENT: Negative.    Eyes: Negative.    Respiratory: Negative.    Cardiovascular: Negative.    Gastrointestinal: Negative.    Endocrine: Negative.    Genitourinary: Negative.    Musculoskeletal: Positive for back pain.   Allergic/Immunologic: Negative.    Neurological: Negative.    Hematological: Negative.    Psychiatric/Behavioral: Negative for agitation, behavioral problems, confusion, hallucinations and suicidal ideas. The patient is not nervous/anxious.        Objective   Physical Exam   Constitutional: He appears well-developed and well-nourished. No distress.   HENT:   Head: Normocephalic and atraumatic.   Right Ear: External ear normal.   Left Ear: External ear normal.   Nose: Nose normal.   Mouth/Throat: Oropharynx is clear and moist. No oropharyngeal exudate.   Eyes: Conjunctivae and EOM are normal. Pupils are equal, round, and reactive to light. Right eye exhibits no discharge. Left eye exhibits no discharge. No scleral icterus.   Neck: Normal range of motion. Neck supple. No JVD present. No thyromegaly present.   Cardiovascular: Normal rate, regular rhythm, normal heart sounds and intact distal pulses. Exam reveals no friction rub.   No murmur heard.  Pulmonary/Chest: Effort normal and breath sounds normal. He has  no wheezes. He has no rales.   Abdominal: Soft. Bowel sounds are normal. He exhibits no distension. There is no tenderness. There is no guarding.   Musculoskeletal: He exhibits tenderness. He exhibits no edema or deformity.   Has mild para spinal muscle tension in left upper back area,   Neurological: He is alert. No cranial nerve deficit.   Skin: Skin is warm. He is not diaphoretic.   Psychiatric: He has a normal mood and affect.   Vitals reviewed.      Patient Active Problem List   Diagnosis   • Snoring   • Fatigue   • Chronic upper back pain   • Depression       Assessment   Problem List Items Addressed This Visit        Behavioral    Depression     Psychological condition is improving with treatment.  Continue current treatment regimen.  Regular aerobic exercise.  Referral to psychological counseling.  Psychological condition will be reassessed in 3 months.         Relevant Medications    sertraline (ZOLOFT) 50 MG tablet       Respiratory    Snoring - Primary     Referred to get sleep study         Relevant Orders    SERVICE TO SLEEP MEDICINE       Musculoskeletal    Chronic upper back pain     Advised to ice the area and also ordered X ray of thoracic spine,            Other    Fatigue     Counseled about diet and hydration and also referred to get sleep study done.         Relevant Orders    SERVICE TO SLEEP MEDICINE          Schedule follow up: in a months

## 2019-04-25 ENCOUNTER — OFFICE VISIT (OUTPATIENT)
Dept: ORTHOPEDICS | Facility: CLINIC | Age: 84
End: 2019-04-25
Payer: MEDICARE

## 2019-04-25 VITALS
DIASTOLIC BLOOD PRESSURE: 70 MMHG | BODY MASS INDEX: 17.66 KG/M2 | WEIGHT: 106 LBS | SYSTOLIC BLOOD PRESSURE: 110 MMHG | HEART RATE: 77 BPM | HEIGHT: 65 IN

## 2019-04-25 DIAGNOSIS — M19.012 GLENOHUMERAL ARTHRITIS, LEFT: ICD-10-CM

## 2019-04-25 DIAGNOSIS — M17.11 PRIMARY OSTEOARTHRITIS OF RIGHT KNEE: Primary | ICD-10-CM

## 2019-04-25 PROCEDURE — 99213 PR OFFICE/OUTPT VISIT, EST, LEVL III, 20-29 MIN: ICD-10-PCS | Mod: 25,,, | Performed by: ORTHOPAEDIC SURGERY

## 2019-04-25 PROCEDURE — 20610 DRAIN/INJ JOINT/BURSA W/O US: CPT | Mod: 51,RT,, | Performed by: ORTHOPAEDIC SURGERY

## 2019-04-25 PROCEDURE — 20610 DRAIN/INJ JOINT/BURSA W/O US: CPT | Mod: LT,,, | Performed by: ORTHOPAEDIC SURGERY

## 2019-04-25 PROCEDURE — 20610 LARGE JOINT ASPIRATION/INJECTION: R KNEE: ICD-10-PCS | Mod: 51,RT,, | Performed by: ORTHOPAEDIC SURGERY

## 2019-04-25 PROCEDURE — 99213 OFFICE O/P EST LOW 20 MIN: CPT | Mod: 25,,, | Performed by: ORTHOPAEDIC SURGERY

## 2019-04-25 RX ORDER — METHYLPREDNISOLONE ACETATE 40 MG/ML
40 INJECTION, SUSPENSION INTRA-ARTICULAR; INTRALESIONAL; INTRAMUSCULAR; SOFT TISSUE
Status: DISCONTINUED | OUTPATIENT
Start: 2019-04-25 | End: 2019-04-25 | Stop reason: HOSPADM

## 2019-04-25 RX ADMIN — METHYLPREDNISOLONE ACETATE 40 MG: 40 INJECTION, SUSPENSION INTRA-ARTICULAR; INTRALESIONAL; INTRAMUSCULAR; SOFT TISSUE at 04:04

## 2019-04-25 NOTE — PROGRESS NOTES
[facility]    Subjective:     Chief Complaint:   Chief Complaint   Patient presents with    Right Knee - Pain     Right knee pain. Here for injection      Left Shoulder - Pain     Left shoulder pain. Here for injection       Past Medical History:   Diagnosis Date    Anemia     Cataract     Disorder of kidney and ureter     Hyperlipidemia     Hypertension     Malnutrition     Osteoporosis     PUD (peptic ulcer disease)     Restless leg syndrome     Rheumatoid arthritis     TIA (transient ischemic attack) 1/23/12       Past Surgical History:   Procedure Laterality Date    ADENOIDECTOMY      ESOPHAGOGASTRODUODENOSCOPY (EGD) N/A 6/27/2017    [facility]    ESOPHAGOGASTRODUODENOSCOPY (EGD) N/A 5/11/2017    [facility]    FRACTURE SURGERY      left hip ORIF    HAND SURGERY      HYSTERECTOMY      complete    TONSILLECTOMY         Current Outpatient Medications   Medication Sig    aspirin (ECOTRIN) 81 MG EC tablet Take 162 mg by mouth once daily. 2 at night    cyanocobalamin (VITAMIN B-12) 1000 MCG tablet Take 100 mcg by mouth once daily.    docusate sodium (COLACE) 100 MG capsule Take 100 mg by mouth 2 (two) times daily.    fluticasone (FLONASE) 50 mcg/actuation nasal spray 1 spray (50 mcg total) by Each Nare route once daily.    lisinopril (PRINIVIL,ZESTRIL) 20 MG tablet Take 1 tablet (20 mg total) by mouth once daily.    ondansetron (ZOFRAN-ODT) 4 MG TbDL Take 1 tablet (4 mg total) by mouth every 6 (six) hours as needed (nausea).    pantoprazole (PROTONIX) 40 MG tablet Take 1 tablet (40 mg total) by mouth once daily.    pramipexole (MIRAPEX) 0.125 MG tablet Take 1 tablet (0.125 mg total) by mouth nightly as needed (rls).    pravastatin (PRAVACHOL) 10 MG tablet Take 1 tablet (10 mg total) by mouth once daily.    VITAMIN D2 50,000 unit capsule TAKE ONE CAPSULE BY MOUTH ONE TIME PER WEEK    triamcinolone acetonide  0.1% (KENALOG) 0.1 % cream Apply topically 2 (two) times daily.     No current facility-administered medications for this visit.        Review of patient's allergies indicates:   Allergen Reactions    Antihistamines - alkylamine Other (See Comments)     Other reaction(s): Hallucinations    Hydrocodone Other (See Comments)     Confusion, isolated, abandon    Ibuprofen      Gi bleed, ckd    Plavix [clopidogrel] Hives    Prednisone Hallucinations     Other reaction(s): Hallucinations    Tramadol     Triamcinolone Other (See Comments)     hallucinations    Penicillins Rash     Other reaction(s): Unknown       Family History   Problem Relation Age of Onset    Heart disease Mother     Heart disease Father     Cancer Brother         stomach cancer    No Known Problems Paternal Grandmother     No Known Problems Paternal Grandfather     Cancer Brother         duodenal cancer    Alcohol abuse Brother     Parkinsonism Brother     Alcohol abuse Brother     No Known Problems Son        Social History     Socioeconomic History    Marital status:      Spouse name: Not on file    Number of children: Not on file    Years of education: Not on file    Highest education level: Not on file   Occupational History    Not on file   Social Needs    Financial resource strain: Not on file    Food insecurity:     Worry: Not on file     Inability: Not on file    Transportation needs:     Medical: Not on file     Non-medical: Not on file   Tobacco Use    Smoking status: Never Smoker    Smokeless tobacco: Never Used   Substance and Sexual Activity    Alcohol use: No    Drug use: No    Sexual activity: Never   Lifestyle    Physical activity:     Days per week: Not on file     Minutes per session: Not on file    Stress: Not on file   Relationships    Social connections:     Talks on phone: Not on file     Gets together: Not on file     Attends Judaism service: Not on file     Active member of club or  organization: Not on file     Attends meetings of clubs or organizations: Not on file     Relationship status: Not on file   Other Topics Concern    Not on file   Social History Narrative    Not on file       History of present illness: Patient returns today for her left shoulder and right knee      Review of Systems:    Constitution: Negative for chills, fever, and sweats.  Negative for unexplained weight loss.    HENT:  Negative for headaches and blurry vision.    Cardiovascular:Negative for chest pain or irregular heart beat. Negative for hypertension.    Respiratory:  Negative for cough and shortness of breath.    Gastrointestinal: Negative for abdominal pain, heartburn, melena, nausea, and vomitting.    Genitourinary:  Negative bladder incontinence and dysuria.    Musculoskeletal:  See HPI for details.     Neurological: Negative for numbness.    Psychiatric/Behavioral: Negative for depression.  The patient is not nervous/anxious.      Endocrine: Negative for polyuria    Hematologic/Lymphatic: Negative for bleeding problem.  Does not bruise/bleed easily.    Skin: Negative for poor would healing and rash    Objective:      Physical Examination:    Vital Signs:    Vitals:    04/25/19 1503   BP: 110/70   Pulse: 77       Body mass index is 17.64 kg/m².    This a well-developed, well nourished patient in no acute distress.  They are alert and oriented and cooperative to examination.        Patient has rheumatoid deformity of the left shoulder in the right knee  Pertinent New Results:    XRAY Report / Interpretation:       Assessment/Plan:      Left shoulder rheumatoid arthritis, right knee rheumatoid arthritis. I injected both her left shoulder and her right knee. She will follow-up when necessary      This note was created using Dragon voice recognition software that occasionally misinterpreted phrases or words.

## 2019-04-25 NOTE — PROCEDURES
Large Joint Aspiration/Injection: L glenohumeral  Date/Time: 4/25/2019 4:38 PM  Performed by: Ken Browning MD  Authorized by: Ken Browning MD     Consent Done?:  Yes (Verbal)  Indications:  Pain  Procedure site marked: Yes    Timeout: Prior to procedure the correct patient, procedure, and site was verified      Location:  Shoulder  Site:  L glenohumeral  Prep: Patient was prepped and draped in usual sterile fashion    Needle size:  25 G  Medications:  40 mg methylPREDNISolone acetate 40 mg/mL; 40 mg methylPREDNISolone acetate 40 mg/mL  Patient tolerance:  Patient tolerated the procedure well with no immediate complications    Large Joint Aspiration/Injection: R knee  Date/Time: 4/25/2019 4:38 PM  Performed by: Ken Browning MD  Authorized by: Ken Browning MD     Consent Done?:  Yes (Verbal)  Indications:  Pain  Procedure site marked: Yes    Timeout: Prior to procedure the correct patient, procedure, and site was verified      Location:  Knee  Site:  R knee  Prep: Patient was prepped and draped in usual sterile fashion    Needle size:  25 G  Medications:  40 mg methylPREDNISolone acetate 40 mg/mL; 40 mg methylPREDNISolone acetate 40 mg/mL  Patient tolerance:  Patient tolerated the procedure well with no immediate complications

## 2019-07-25 ENCOUNTER — OFFICE VISIT (OUTPATIENT)
Dept: ORTHOPEDICS | Facility: CLINIC | Age: 84
End: 2019-07-25
Payer: MEDICARE

## 2019-07-25 VITALS
DIASTOLIC BLOOD PRESSURE: 84 MMHG | WEIGHT: 106 LBS | HEIGHT: 65 IN | HEART RATE: 72 BPM | SYSTOLIC BLOOD PRESSURE: 107 MMHG | BODY MASS INDEX: 17.66 KG/M2

## 2019-07-25 DIAGNOSIS — M17.11 PRIMARY OSTEOARTHRITIS OF RIGHT KNEE: Primary | ICD-10-CM

## 2019-07-25 PROCEDURE — 20610 LARGE JOINT ASPIRATION/INJECTION: R KNEE: ICD-10-PCS | Mod: RT,,, | Performed by: ORTHOPAEDIC SURGERY

## 2019-07-25 PROCEDURE — 99213 PR OFFICE/OUTPT VISIT, EST, LEVL III, 20-29 MIN: ICD-10-PCS | Mod: 25,,, | Performed by: ORTHOPAEDIC SURGERY

## 2019-07-25 PROCEDURE — 99213 OFFICE O/P EST LOW 20 MIN: CPT | Mod: 25,,, | Performed by: ORTHOPAEDIC SURGERY

## 2019-07-25 PROCEDURE — 20610 DRAIN/INJ JOINT/BURSA W/O US: CPT | Mod: RT,,, | Performed by: ORTHOPAEDIC SURGERY

## 2019-07-25 RX ORDER — METHYLPREDNISOLONE ACETATE 40 MG/ML
40 INJECTION, SUSPENSION INTRA-ARTICULAR; INTRALESIONAL; INTRAMUSCULAR; SOFT TISSUE
Status: DISCONTINUED | OUTPATIENT
Start: 2019-07-25 | End: 2019-07-25 | Stop reason: HOSPADM

## 2019-07-25 RX ADMIN — METHYLPREDNISOLONE ACETATE 40 MG: 40 INJECTION, SUSPENSION INTRA-ARTICULAR; INTRALESIONAL; INTRAMUSCULAR; SOFT TISSUE at 09:07

## 2019-07-25 NOTE — PROCEDURES
Large Joint Aspiration/Injection: R knee  Date/Time: 7/25/2019 9:01 AM  Performed by: Ken Browning MD  Authorized by: Ken Browning MD     Consent Done?:  Yes (Verbal)  Indications:  Pain  Procedure site marked: Yes    Timeout: Prior to procedure the correct patient, procedure, and site was verified      Location:  Knee  Site:  R knee  Prep: Patient was prepped and draped in usual sterile fashion    Needle size:  25 G  Medications:  40 mg methylPREDNISolone acetate 40 mg/mL  Patient tolerance:  Patient tolerated the procedure well with no immediate complications

## 2019-07-25 NOTE — PROGRESS NOTES
[facility]    Subjective:     Chief Complaint:   Chief Complaint   Patient presents with    Left Shoulder - Pain     Left Shoulder Injection 4.25.19 follow up. She is here for another left shoulder injection    Right Knee - Pain     She is here for right knee injection       Past Medical History:   Diagnosis Date    Anemia     Cataract     Disorder of kidney and ureter     Hyperlipidemia     Hypertension     Malnutrition     Osteoporosis     PUD (peptic ulcer disease)     Restless leg syndrome     Rheumatoid arthritis     TIA (transient ischemic attack) 1/23/12       Past Surgical History:   Procedure Laterality Date    ADENOIDECTOMY      ESOPHAGOGASTRODUODENOSCOPY (EGD) N/A 6/27/2017    [facility]    ESOPHAGOGASTRODUODENOSCOPY (EGD) N/A 5/11/2017    [facility]    FRACTURE SURGERY      left hip ORIF    HAND SURGERY      HYSTERECTOMY      complete    TONSILLECTOMY         Current Outpatient Medications   Medication Sig    aspirin (ECOTRIN) 81 MG EC tablet Take 162 mg by mouth once daily. 2 at night    cyanocobalamin (VITAMIN B-12) 1000 MCG tablet Take 100 mcg by mouth once daily.    docusate sodium (COLACE) 100 MG capsule Take 100 mg by mouth 2 (two) times daily.    fluticasone (FLONASE) 50 mcg/actuation nasal spray 1 spray (50 mcg total) by Each Nare route once daily.    lisinopril (PRINIVIL,ZESTRIL) 20 MG tablet Take 1 tablet (20 mg total) by mouth once daily.    ondansetron (ZOFRAN-ODT) 4 MG TbDL Take 1 tablet (4 mg total) by mouth every 6 (six) hours as needed (nausea).    pantoprazole (PROTONIX) 40 MG tablet Take 1 tablet (40 mg total) by mouth once daily.    pramipexole (MIRAPEX) 0.125 MG tablet Take 1 tablet (0.125 mg total) by mouth nightly as needed (rls).    pravastatin (PRAVACHOL) 10 MG tablet Take 1 tablet (10 mg total) by mouth once daily.    VITAMIN D2 50,000 unit capsule TAKE ONE CAPSULE BY  MOUTH ONE TIME PER WEEK    triamcinolone acetonide 0.1% (KENALOG) 0.1 % cream Apply topically 2 (two) times daily.     No current facility-administered medications for this visit.        Review of patient's allergies indicates:   Allergen Reactions    Antihistamines - alkylamine Other (See Comments)     Other reaction(s): Hallucinations    Hydrocodone Other (See Comments)     Confusion, isolated, abandon    Ibuprofen      Gi bleed, ckd    Plavix [clopidogrel] Hives    Prednisone Hallucinations     Other reaction(s): Hallucinations    Tramadol     Triamcinolone Other (See Comments)     hallucinations    Penicillins Rash     Other reaction(s): Unknown       Family History   Problem Relation Age of Onset    Heart disease Mother     Heart disease Father     Cancer Brother         stomach cancer    No Known Problems Paternal Grandmother     No Known Problems Paternal Grandfather     Cancer Brother         duodenal cancer    Alcohol abuse Brother     Parkinsonism Brother     Alcohol abuse Brother     No Known Problems Son        Social History     Socioeconomic History    Marital status:      Spouse name: Not on file    Number of children: Not on file    Years of education: Not on file    Highest education level: Not on file   Occupational History    Not on file   Social Needs    Financial resource strain: Not on file    Food insecurity:     Worry: Not on file     Inability: Not on file    Transportation needs:     Medical: Not on file     Non-medical: Not on file   Tobacco Use    Smoking status: Never Smoker    Smokeless tobacco: Never Used   Substance and Sexual Activity    Alcohol use: No    Drug use: No    Sexual activity: Never   Lifestyle    Physical activity:     Days per week: Not on file     Minutes per session: Not on file    Stress: Not on file   Relationships    Social connections:     Talks on phone: Not on file     Gets together: Not on file     Attends Jain  service: Not on file     Active member of club or organization: Not on file     Attends meetings of clubs or organizations: Not on file     Relationship status: Not on file   Other Topics Concern    Not on file   Social History Narrative    Not on file       History of present illness: Patient comes in today for the left shoulder and the right knee. Her left shoulder generally doing well. Her right knee is giving her tremendous pain      Review of Systems:    Constitution: Negative for chills, fever, and sweats.  Negative for unexplained weight loss.    HENT:  Negative for headaches and blurry vision.    Cardiovascular:Negative for chest pain or irregular heart beat. Negative for hypertension.    Respiratory:  Negative for cough and shortness of breath.    Gastrointestinal: Negative for abdominal pain, heartburn, melena, nausea, and vomitting.    Genitourinary:  Negative bladder incontinence and dysuria.    Musculoskeletal:  See HPI for details.     Neurological: Negative for numbness.    Psychiatric/Behavioral: Negative for depression.  The patient is not nervous/anxious.      Endocrine: Negative for polyuria    Hematologic/Lymphatic: Negative for bleeding problem.  Does not bruise/bleed easily.    Skin: Negative for poor would healing and rash    Objective:      Physical Examination:    Vital Signs:    Vitals:    07/25/19 0830   BP: 107/84   Pulse: 72       Body mass index is 17.64 kg/m².    This a well-developed, well nourished patient in no acute distress.  They are alert and oriented and cooperative to examination.        Patient has tremendous deformities of the upper extremities and the hands consistent with rheumatoid arthritis. She has range of motion of the right knee from 10-70 degrees.  Pertinent New Results:    XRAY Report / Interpretation:       Assessment/Plan:      Severe arthritis right knee. I injected the right knee with Depo-Medrol and lidocaine. Follow-up in 3 months      This note was created  using Dragon voice recognition software that occasionally misinterpreted phrases or words.

## 2019-09-26 ENCOUNTER — OFFICE VISIT (OUTPATIENT)
Dept: ORTHOPEDICS | Facility: CLINIC | Age: 84
End: 2019-09-26
Payer: MEDICARE

## 2019-09-26 VITALS
HEIGHT: 65 IN | WEIGHT: 106 LBS | HEART RATE: 74 BPM | SYSTOLIC BLOOD PRESSURE: 120 MMHG | BODY MASS INDEX: 17.66 KG/M2 | DIASTOLIC BLOOD PRESSURE: 72 MMHG

## 2019-09-26 DIAGNOSIS — M17.11 PRIMARY OSTEOARTHRITIS OF RIGHT KNEE: Primary | ICD-10-CM

## 2019-09-26 DIAGNOSIS — M19.012 GLENOHUMERAL ARTHRITIS, LEFT: ICD-10-CM

## 2019-09-26 PROCEDURE — 99499 UNLISTED E&M SERVICE: CPT | Mod: S$GLB,,, | Performed by: ORTHOPAEDIC SURGERY

## 2019-09-26 PROCEDURE — 99213 PR OFFICE/OUTPT VISIT, EST, LEVL III, 20-29 MIN: ICD-10-PCS | Mod: 25,S$GLB,ICN, | Performed by: ORTHOPAEDIC SURGERY

## 2019-09-26 PROCEDURE — 99499 LARGE JOINT ASPIRATION/INJECTION: L GLENOHUMERAL: ICD-10-PCS | Mod: S$GLB,,, | Performed by: ORTHOPAEDIC SURGERY

## 2019-09-26 PROCEDURE — 20610 LARGE JOINT ASPIRATION/INJECTION: R KNEE: ICD-10-PCS | Mod: 50,S$GLB,ICN, | Performed by: ORTHOPAEDIC SURGERY

## 2019-09-26 PROCEDURE — 99213 OFFICE O/P EST LOW 20 MIN: CPT | Mod: 25,S$GLB,ICN, | Performed by: ORTHOPAEDIC SURGERY

## 2019-09-26 PROCEDURE — 20610 DRAIN/INJ JOINT/BURSA W/O US: CPT | Mod: 50,S$GLB,ICN, | Performed by: ORTHOPAEDIC SURGERY

## 2019-09-26 RX ORDER — METHYLPREDNISOLONE ACETATE 40 MG/ML
40 INJECTION, SUSPENSION INTRA-ARTICULAR; INTRALESIONAL; INTRAMUSCULAR; SOFT TISSUE
Status: DISCONTINUED | OUTPATIENT
Start: 2019-09-26 | End: 2019-09-26 | Stop reason: HOSPADM

## 2019-09-26 RX ADMIN — METHYLPREDNISOLONE ACETATE 40 MG: 40 INJECTION, SUSPENSION INTRA-ARTICULAR; INTRALESIONAL; INTRAMUSCULAR; SOFT TISSUE at 10:09

## 2019-09-26 NOTE — PROCEDURES
Large Joint Aspiration/Injection: R knee  Date/Time: 9/26/2019 10:15 AM  Performed by: Ken Browning MD  Authorized by: Ken Browning MD     Consent Done?:  Yes (Verbal)  Indications:  Pain  Procedure site marked: Yes    Timeout: Prior to procedure the correct patient, procedure, and site was verified    Anesthesia  Local anesthesia used  Anesthetic: lidocaine 1% without epinephrine    Location:  Knee  Site:  R knee  Prep: Patient was prepped and draped in usual sterile fashion    Needle size:  25 G  Medications:  40 mg methylPREDNISolone acetate 40 mg/mL; 40 mg methylPREDNISolone acetate 40 mg/mL  Patient tolerance:  Patient tolerated the procedure well with no immediate complications  Large Joint Aspiration/Injection: L glenohumeral  Date/Time: 9/26/2019 10:15 AM  Performed by: Ken Browning MD  Authorized by: Ken Browning MD     Consent Done?:  Yes (Verbal)  Indications:  Pain  Procedure site marked: Yes    Timeout: Prior to procedure the correct patient, procedure, and site was verified    Anesthesia  Local anesthesia used      Location:  Shoulder  Site:  L glenohumeral  Prep: Patient was prepped and draped in usual sterile fashion    Needle size:  25 G  Medications:  40 mg methylPREDNISolone acetate 40 mg/mL; 40 mg methylPREDNISolone acetate 40 mg/mL  Patient tolerance:  Patient tolerated the procedure well with no immediate complications

## 2019-09-26 NOTE — PROGRESS NOTES
[facility]    Subjective:     Chief Complaint:   Chief Complaint   Patient presents with    Right Knee - Pain     RT knee pain follow up, injection done 7/25/19. States she wants an injection today.    Left Shoulder - Pain     LT shoulder pain, states she wants an injection today. Last injection done 4/25/19        Past Medical History:   Diagnosis Date    Anemia     Cataract     Disorder of kidney and ureter     Hyperlipidemia     Hypertension     Malnutrition     Osteoporosis     PUD (peptic ulcer disease)     Restless leg syndrome     Rheumatoid arthritis     TIA (transient ischemic attack) 1/23/12       Past Surgical History:   Procedure Laterality Date    ADENOIDECTOMY      FRACTURE SURGERY      left hip ORIF    HAND SURGERY      HYSTERECTOMY      complete    TONSILLECTOMY         Current Outpatient Medications   Medication Sig    aspirin (ECOTRIN) 81 MG EC tablet Take 162 mg by mouth once daily. 2 at night    cyanocobalamin (VITAMIN B-12) 1000 MCG tablet Take 100 mcg by mouth once daily.    docusate sodium (COLACE) 100 MG capsule Take 100 mg by mouth 2 (two) times daily.    fluticasone (FLONASE) 50 mcg/actuation nasal spray 1 spray (50 mcg total) by Each Nare route once daily.    lisinopril (PRINIVIL,ZESTRIL) 20 MG tablet Take 1 tablet (20 mg total) by mouth once daily.    ondansetron (ZOFRAN-ODT) 4 MG TbDL Take 1 tablet (4 mg total) by mouth every 6 (six) hours as needed (nausea).    pravastatin (PRAVACHOL) 10 MG tablet Take 1 tablet (10 mg total) by mouth once daily.    VITAMIN D2 50,000 unit capsule TAKE ONE CAPSULE BY MOUTH ONE TIME PER WEEK    pantoprazole (PROTONIX) 40 MG tablet Take 1 tablet (40 mg total) by mouth once daily.    pramipexole (MIRAPEX) 0.125 MG tablet Take 1 tablet (0.125 mg total) by mouth nightly as needed (rls).    triamcinolone acetonide 0.1% (KENALOG) 0.1 % cream Apply topically 2 (two) times daily.     No current facility-administered  medications for this visit.        Review of patient's allergies indicates:   Allergen Reactions    Antihistamines - alkylamine Other (See Comments)     Other reaction(s): Hallucinations    Hydrocodone Other (See Comments)     Confusion, isolated, abandon    Ibuprofen      Gi bleed, ckd    Plavix [clopidogrel] Hives    Prednisone Hallucinations     Other reaction(s): Hallucinations    Tramadol     Triamcinolone Other (See Comments)     hallucinations    Penicillins Rash     Other reaction(s): Unknown       Family History   Problem Relation Age of Onset    Heart disease Mother     Heart disease Father     Cancer Brother         stomach cancer    No Known Problems Paternal Grandmother     No Known Problems Paternal Grandfather     Cancer Brother         duodenal cancer    Alcohol abuse Brother     Parkinsonism Brother     Alcohol abuse Brother     No Known Problems Son        Social History     Socioeconomic History    Marital status:      Spouse name: Not on file    Number of children: Not on file    Years of education: Not on file    Highest education level: Not on file   Occupational History    Not on file   Social Needs    Financial resource strain: Not on file    Food insecurity:     Worry: Not on file     Inability: Not on file    Transportation needs:     Medical: Not on file     Non-medical: Not on file   Tobacco Use    Smoking status: Never Smoker    Smokeless tobacco: Never Used   Substance and Sexual Activity    Alcohol use: No    Drug use: No    Sexual activity: Never   Lifestyle    Physical activity:     Days per week: Not on file     Minutes per session: Not on file    Stress: Not on file   Relationships    Social connections:     Talks on phone: Not on file     Gets together: Not on file     Attends Congregation service: Not on file     Active member of club or organization: Not on file     Attends meetings of clubs or organizations: Not on file     Relationship  status: Not on file   Other Topics Concern    Not on file   Social History Narrative    Not on file       History of present illness: Patient comes in today for the right knee in the left shoulder. She has severe arthritis in the right knee and the left shoulder      Review of Systems:    Constitution: Negative for chills, fever, and sweats.  Negative for unexplained weight loss.    HENT:  Negative for headaches and blurry vision.    Cardiovascular:Negative for chest pain or irregular heart beat. Negative for hypertension.    Respiratory:  Negative for cough and shortness of breath.    Gastrointestinal: Negative for abdominal pain, heartburn, melena, nausea, and vomitting.    Genitourinary:  Negative bladder incontinence and dysuria.    Musculoskeletal:  See HPI for details.     Neurological: Negative for numbness.    Psychiatric/Behavioral: Negative for depression.  The patient is not nervous/anxious.      Endocrine: Negative for polyuria    Hematologic/Lymphatic: Negative for bleeding problem.  Does not bruise/bleed easily.    Skin: Negative for poor would healing and rash    Objective:      Physical Examination:    Vital Signs:    Vitals:    09/26/19 1019   BP: 120/72   Pulse: 74       Body mass index is 17.64 kg/m².    This a well-developed, well nourished patient in no acute distress.  They are alert and oriented and cooperative to examination.        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 I injected both the knee and the shoulder with Depo-Medrol and lidocaine. Follow-up when necessary      This note was created using Dragon voice recognition software that occasionally misinterpreted phrases or words.

## 2019-10-04 DIAGNOSIS — R09.89 BILATERAL CAROTID BRUITS: ICD-10-CM

## 2019-10-04 DIAGNOSIS — E78.2 MIXED HYPERLIPIDEMIA: ICD-10-CM

## 2019-10-04 DIAGNOSIS — Z86.73 HISTORY OF TIA (TRANSIENT ISCHEMIC ATTACK): ICD-10-CM

## 2019-10-04 RX ORDER — PRAVASTATIN SODIUM 10 MG/1
10 TABLET ORAL DAILY
Qty: 90 TABLET | Refills: 1 | Status: SHIPPED | OUTPATIENT
Start: 2019-10-04 | End: 2020-01-01 | Stop reason: SDUPTHER

## 2019-10-04 NOTE — TELEPHONE ENCOUNTER
----- Message from Luma Reilly sent at 10/4/2019 10:55 AM CDT -----  Patient needs a refil of pravastatin 10mg sent to [facility]  On [facility]

## 2019-11-21 NOTE — TELEPHONE ENCOUNTER
----- Message from uLma Reilly sent at 11/21/2019 10:32 AM CST -----  Patient needs a refill of lisinopril sent to [facility] on [address].

## 2019-11-21 NOTE — PROGRESS NOTES
[facility]    Subjective:     Chief Complaint:   Chief Complaint   Patient presents with    Right Knee - Pain     Here for injection in right knee /left shoulder  if possible. Last injections was 9-26       Past Medical History:   Diagnosis Date    Anemia     Cataract     Disorder of kidney and ureter     Hyperlipidemia     Hypertension     Malnutrition     Osteoporosis     PUD (peptic ulcer disease)     Restless leg syndrome     Rheumatoid arthritis     TIA (transient ischemic attack) 1/23/12       Past Surgical History:   Procedure Laterality Date    ADENOIDECTOMY      FRACTURE SURGERY      left hip ORIF    HAND SURGERY      HYSTERECTOMY      complete    TONSILLECTOMY         Current Outpatient Medications   Medication Sig    aspirin (ECOTRIN) 81 MG EC tablet Take 162 mg by mouth once daily. 2 at night    cyanocobalamin (VITAMIN B-12) 1000 MCG tablet Take 100 mcg by mouth once daily.    docusate sodium (COLACE) 100 MG capsule Take 100 mg by mouth 2 (two) times daily.    fluticasone (FLONASE) 50 mcg/actuation nasal spray 1 spray (50 mcg total) by Each Nare route once daily.    lisinopril (PRINIVIL,ZESTRIL) 20 MG tablet Take 1 tablet (20 mg total) by mouth once daily.    ondansetron (ZOFRAN-ODT) 4 MG TbDL Take 1 tablet (4 mg total) by mouth every 6 (six) hours as needed (nausea).    pravastatin (PRAVACHOL) 10 MG tablet Take 1 tablet (10 mg total) by mouth once daily.    VITAMIN D2 50,000 unit capsule TAKE ONE CAPSULE BY MOUTH ONE TIME PER WEEK    pantoprazole (PROTONIX) 40 MG tablet Take 1 tablet (40 mg total) by mouth once daily.    pramipexole (MIRAPEX) 0.125 MG tablet Take 1 tablet (0.125 mg total) by mouth nightly as needed (rls).    triamcinolone acetonide 0.1% (KENALOG) 0.1 % cream Apply topically 2 (two) times daily.     No current facility-administered medications for this visit.        Review of patient's allergies indicates:   Allergen Reactions    Antihistamines -  alkylamine Other (See Comments)     Other reaction(s): Hallucinations    Hydrocodone Other (See Comments)     Confusion, isolated, abandon    Ibuprofen      Gi bleed, ckd    Plavix [clopidogrel] Hives    Prednisone Hallucinations     Other reaction(s): Hallucinations    Tramadol     Triamcinolone Other (See Comments)     hallucinations    Penicillins Rash     Other reaction(s): Unknown       Family History   Problem Relation Age of Onset    Heart disease Mother     Heart disease Father     Cancer Brother         stomach cancer    No Known Problems Paternal Grandmother     No Known Problems Paternal Grandfather     Cancer Brother         duodenal cancer    Alcohol abuse Brother     Parkinsonism Brother     Alcohol abuse Brother     No Known Problems Son        Social History     Socioeconomic History    Marital status:      Spouse name: Not on file    Number of children: Not on file    Years of education: Not on file    Highest education level: Not on file   Occupational History    Not on file   Social Needs    Financial resource strain: Not on file    Food insecurity:     Worry: Not on file     Inability: Not on file    Transportation needs:     Medical: Not on file     Non-medical: Not on file   Tobacco Use    Smoking status: Never Smoker    Smokeless tobacco: Never Used   Substance and Sexual Activity    Alcohol use: No    Drug use: No    Sexual activity: Never   Lifestyle    Physical activity:     Days per week: Not on file     Minutes per session: Not on file    Stress: Not on file   Relationships    Social connections:     Talks on phone: Not on file     Gets together: Not on file     Attends Mandaeism service: Not on file     Active member of club or organization: Not on file     Attends meetings of clubs or organizations: Not on file     Relationship status: Not on file   Other Topics Concern    Not on file   Social History Narrative    Not on file       History of  present illness:  Patient comes in today for the right knee in the left shoulder.  Both the bothering her.      Review of Systems:    Constitution: Negative for chills, fever, and sweats.  Negative for unexplained weight loss.    HENT:  Negative for headaches and blurry vision.    Cardiovascular:Negative for chest pain or irregular heart beat. Negative for hypertension.    Respiratory:  Negative for cough and shortness of breath.    Gastrointestinal: Negative for abdominal pain, heartburn, melena, nausea, and vomitting.    Genitourinary:  Negative bladder incontinence and dysuria.    Musculoskeletal:  See HPI for details.     Neurological: Negative for numbness.    Psychiatric/Behavioral: Negative for depression.  The patient is not nervous/anxious.      Endocrine: Negative for polyuria    Hematologic/Lymphatic: Negative for bleeding problem.  Does not bruise/bleed easily.    Skin: Negative for poor would healing and rash    Objective:      Physical Examination:    Vital Signs:    Vitals:    11/21/19 0953   BP: 96/64       Body mass index is 19.47 kg/m².    This a well-developed, well nourished patient in no acute distress.  They are alert and oriented and cooperative to examination.        Patient has minimal range of motion of the knee or the shoulder.  She has severe deformity associated with severe rheumatoid arthritis  Pertinent New Results:    XRAY Report / Interpretation:       Assessment/Plan:      Severe arthritis of the the left  shoulder and the right knee.  I injected both Depo-Medrol and lidocaine.  Follow up p.r.n.      This note was created using Dragon voice recognition software that occasionally misinterpreted phrases or words.

## 2019-11-21 NOTE — PROCEDURES
Large Joint Aspiration/Injection: R knee  Date/Time: 11/21/2019 9:30 AM  Performed by: Ken Browning MD  Authorized by: Ken Browning MD     Consent Done?:  Yes (Verbal)  Indications:  Pain  Procedure site marked: Yes    Timeout: Prior to procedure the correct patient, procedure, and site was verified    Anesthesia  Local anesthesia used  Anesthesia: local infiltration  Anesthetic: lidocaine 1% without epinephrine    Location:  Knee  Site:  R knee  Prep: Patient was prepped and draped in usual sterile fashion    Needle size:  25 G  Medications:  40 mg methylPREDNISolone acetate 40 mg/mL; 40 mg methylPREDNISolone acetate 40 mg/mL  Patient tolerance:  Patient tolerated the procedure well with no immediate complications  Large Joint Aspiration/Injection: L subacromial bursa  Date/Time: 11/21/2019 9:30 AM  Performed by: Ken Browning MD  Authorized by: Ken Browning MD     Consent Done?:  Yes (Verbal)  Indications:  Pain  Procedure site marked: Yes    Timeout: Prior to procedure the correct patient, procedure, and site was verified    Anesthesia  Local anesthesia used  Anesthesia: local infiltration  Anesthetic: lidocaine 1% without epinephrine    Location:  Shoulder  Site:  L subacromial bursa  Prep: Patient was prepped and draped in usual sterile fashion    Needle size:  25 G  Medications:  40 mg methylPREDNISolone acetate 40 mg/mL; 40 mg methylPREDNISolone acetate 40 mg/mL  Patient tolerance:  Patient tolerated the procedure well with no immediate complications

## 2019-12-16 PROBLEM — Z99.89 WALKER AS AMBULATION AID: Status: ACTIVE | Noted: 2019-01-01

## 2019-12-16 NOTE — PROGRESS NOTES
SUBJECTIVE:    Patient ID: Melody Dupont is a 89 y.o. female.    Chief Complaint: Hypertension (check up)    Pt here to checkup on chronic conditions.    BP is doing ok. Denies CP/SOB.    Has a hx of Dr. Hope for RA, no longer taking medicines. Has never taken a biologic  Has pains in the right knee, left shoulder, hands.  Sees Dr. Browning every 3 months for an injections in the right knee.    Sees Derm and has had spots burned off where she was itching and was burned. Still itching and now benadryl is used but it doesn't help much.      Saw Dr. Lugo once, and told kidneys were fine.    Hx of TIAs, contin ot have them about once a month, takes ASA 81mg.     Hx colitis in 6/2018.  Hx gastric ulcer.    Suffers with constipation, takes stool softener and senna    Stays at home by herself. Daughter-in-law, david comes over every day.  Uses wheelchair mostly, walks when DIL is in the house, daily, with walker              Past Medical History:   Diagnosis Date    Anemia     Cataract     Disorder of kidney and ureter     Hyperlipidemia     Hypertension     Malnutrition     Osteoporosis     PUD (peptic ulcer disease)     Restless leg syndrome     Rheumatoid arthritis     TIA (transient ischemic attack) 1/23/12     Past Surgical History:   Procedure Laterality Date    ADENOIDECTOMY      FRACTURE SURGERY      left hip ORIF    HAND SURGERY      HYSTERECTOMY      complete    TONSILLECTOMY       Family History   Problem Relation Age of Onset    Heart disease Mother     Heart disease Father     Cancer Brother         stomach cancer    No Known Problems Paternal Grandmother     No Known Problems Paternal Grandfather     Cancer Brother         duodenal cancer    Alcohol abuse Brother     Parkinsonism Brother     Alcohol abuse Brother     No Known Problems Son        Marital Status:   Alcohol History:  reports that she does not drink alcohol.  Tobacco History:  reports that she has  never smoked. She has never used smokeless tobacco.  Drug History:  reports that she does not use drugs.    Review of patient's allergies indicates:   Allergen Reactions    Antihistamines - alkylamine Other (See Comments)     Other reaction(s): Hallucinations    Hydrocodone Other (See Comments)     Confusion, isolated, abandon    Ibuprofen      Gi bleed, ckd    Plavix [clopidogrel] Hives    Prednisone Hallucinations     Other reaction(s): Hallucinations    Tramadol     Triamcinolone Other (See Comments)     hallucinations    Penicillins Rash     Other reaction(s): Unknown       Current Outpatient Medications:     aspirin (ECOTRIN) 81 MG EC tablet, Take 162 mg by mouth once daily. 2 at night, Disp: , Rfl:     cyanocobalamin (VITAMIN B-12) 1000 MCG tablet, Take 100 mcg by mouth once daily., Disp: , Rfl:     docusate sodium (COLACE) 100 MG capsule, Take 100 mg by mouth 2 (two) times daily., Disp: , Rfl:     fluticasone (FLONASE) 50 mcg/actuation nasal spray, 1 spray (50 mcg total) by Each Nare route once daily., Disp: 1 Bottle, Rfl: prn    lisinopril (PRINIVIL,ZESTRIL) 20 MG tablet, Take 1 tablet (20 mg total) by mouth once daily., Disp: 90 tablet, Rfl: 1    pantoprazole (PROTONIX) 40 MG tablet, Take 1 tablet (40 mg total) by mouth once daily., Disp: 90 tablet, Rfl: 3    pramipexole (MIRAPEX) 0.125 MG tablet, Take 1 tablet (0.125 mg total) by mouth nightly as needed (rls)., Disp: 90 tablet, Rfl: 3    pravastatin (PRAVACHOL) 10 MG tablet, Take 1 tablet (10 mg total) by mouth once daily., Disp: 90 tablet, Rfl: 1    VITAMIN D2 50,000 unit capsule, TAKE ONE CAPSULE BY MOUTH ONE TIME PER WEEK, Disp: 12 capsule, Rfl: 1    ondansetron (ZOFRAN-ODT) 4 MG TbDL, Take 1 tablet (4 mg total) by mouth every 6 (six) hours as needed (nausea)., Disp: 30 tablet, Rfl: 0    triamcinolone acetonide 0.1% (KENALOG) 0.1 % cream, Apply topically 2 (two) times daily., Disp: 80 g, Rfl: 0    Review of Systems   Constitutional:  "Negative for appetite change, fatigue, fever and unexpected weight change.   HENT: Positive for postnasal drip.    Eyes: Positive for visual disturbance (double vision that comes and goes.).   Respiratory: Negative for cough, chest tightness, shortness of breath and wheezing.    Cardiovascular: Negative for chest pain and leg swelling.   Gastrointestinal: Negative for abdominal pain, constipation, nausea and vomiting.        -heartburn   Genitourinary: Negative for difficulty urinating, dysuria, frequency and urgency.   Musculoskeletal: Positive for arthralgias. Negative for back pain, myalgias and neck pain.   Skin: Negative for rash.   Neurological: Negative for dizziness, weakness, numbness and headaches.   Hematological: Does not bruise/bleed easily.   Psychiatric/Behavioral: Negative for dysphoric mood, sleep disturbance and suicidal ideas. The patient is not nervous/anxious.    All other systems reviewed and are negative.         Objective:      Vitals:    12/16/19 1017   BP: (P) 106/78   Pulse: (P) 76   SpO2: (P) 98%   Height: (P) 5' 5" (1.651 m)     Body mass index is 19.47 kg/m² (pended).     Physical Exam   Constitutional: She is oriented to person, place, and time. She appears well-developed and well-nourished. No distress.   thin   HENT:   Head: Normocephalic and atraumatic.   Neck: Neck supple. No thyromegaly present.   Cardiovascular: Normal rate, regular rhythm and normal heart sounds. Exam reveals no friction rub.   No murmur heard.  Pulmonary/Chest: Effort normal and breath sounds normal. She has no wheezes. She has no rales.   Abdominal: Soft. Bowel sounds are normal. She exhibits no distension. There is no tenderness.   Musculoskeletal: She exhibits no edema.        Right hand: She exhibits deformity.        Left hand: She exhibits deformity.   Lymphadenopathy:     She has no cervical adenopathy.   Neurological: She is alert and oriented to person, place, and time.   Skin: Skin is warm and dry. No " rash noted.   Psychiatric: She has a normal mood and affect. Her speech is normal and behavior is normal. Judgment and thought content normal. She is attentive.   Vitals reviewed.        Assessment:       1. Essential hypertension    2. RLS (restless legs syndrome)    3. Gastroesophageal reflux disease without esophagitis    4. Flu vaccine need    5. Walker as ambulation aid    6. Vitamin D deficiency    7. Dermatitis    8. Thyroid function test abnormal    9. Long-term use of high-risk medication    10. Screening for blood or protein in urine    11. Screening for ischemic heart disease (IHD)         Plan:       Essential hypertension  Comments:  Blood pressure controlled. Will continue to monitor  Orders:  -     Comprehensive metabolic panel; Future; Expected date: 05/16/2020  -     Lipid panel; Future; Expected date: 05/16/2020  -     Microalbumin/creatinine urine ratio; Future; Expected date: 05/16/2020  -     Urinalysis; Future; Expected date: 05/16/2020  -     CBC auto differential; Future; Expected date: 05/16/2020    RLS (restless legs syndrome)  Comments:  Controlled. Will continue to monitor.  Orders:  -     pramipexole (MIRAPEX) 0.125 MG tablet; Take 1 tablet (0.125 mg total) by mouth nightly as needed (rls).  Dispense: 90 tablet; Refill: 3    Gastroesophageal reflux disease without esophagitis  Comments:  Controlled. Will continue to monitor  Orders:  -     pantoprazole (PROTONIX) 40 MG tablet; Take 1 tablet (40 mg total) by mouth once daily.  Dispense: 90 tablet; Refill: 3    Flu vaccine need  Comments:  Flu vaccine given today  Orders:  -     Influenza - High Dose (65+) (PF) (IM)    Walker as ambulation aid  Comments:  Encouraged to use walker in order to prevent fall    Vitamin D deficiency  Comments:  Active.  Labs ordered for monitoring of chronic condition.  Orders:  -     Calcitriol; Future; Expected date: 05/16/2020    Dermatitis  Comments:  Stable, continue otc cream.    Thyroid function test  abnormal  Comments:  Repeat TSH order to monitor abnormal function test  Orders:  -     TSH; Future; Expected date: 05/16/2020    Long-term use of high-risk medication  -     Calcitriol; Future; Expected date: 05/16/2020    Screening for blood or protein in urine  -     Urinalysis; Future; Expected date: 05/16/2020  -     CBC auto differential; Future; Expected date: 05/16/2020    Screening for ischemic heart disease (IHD)  -     Lipid panel; Future; Expected date: 05/16/2020    Labs have been ordered for monitoring of chronic conditions, just before next visit.\    Follow up in about 6 months (around 6/16/2020) for HTN, GERD, RLS.

## 2020-01-01 ENCOUNTER — TELEPHONE (OUTPATIENT)
Dept: FAMILY MEDICINE | Facility: CLINIC | Age: 85
End: 2020-01-01

## 2020-01-01 ENCOUNTER — HOSPITAL ENCOUNTER (INPATIENT)
Facility: HOSPITAL | Age: 85
LOS: 1 days | DRG: 951 | End: 2020-10-24
Attending: INTERNAL MEDICINE | Admitting: HOSPITALIST
Payer: OTHER MISCELLANEOUS

## 2020-01-01 ENCOUNTER — OFFICE VISIT (OUTPATIENT)
Dept: ORTHOPEDICS | Facility: CLINIC | Age: 85
End: 2020-01-01
Payer: MEDICARE

## 2020-01-01 ENCOUNTER — OFFICE VISIT (OUTPATIENT)
Dept: FAMILY MEDICINE | Facility: CLINIC | Age: 85
End: 2020-01-01
Payer: MEDICARE

## 2020-01-01 ENCOUNTER — HOSPITAL ENCOUNTER (INPATIENT)
Facility: HOSPITAL | Age: 85
LOS: 2 days | DRG: 064 | End: 2020-10-23
Attending: EMERGENCY MEDICINE | Admitting: INTERNAL MEDICINE
Payer: MEDICARE

## 2020-01-01 ENCOUNTER — DOCUMENTATION ONLY (OUTPATIENT)
Dept: FAMILY MEDICINE | Facility: CLINIC | Age: 85
End: 2020-01-01

## 2020-01-01 VITALS
DIASTOLIC BLOOD PRESSURE: 34 MMHG | OXYGEN SATURATION: 92 % | RESPIRATION RATE: 20 BRPM | HEART RATE: 87 BPM | SYSTOLIC BLOOD PRESSURE: 66 MMHG | TEMPERATURE: 98 F

## 2020-01-01 VITALS
SYSTOLIC BLOOD PRESSURE: 94 MMHG | TEMPERATURE: 98 F | WEIGHT: 127.44 LBS | RESPIRATION RATE: 18 BRPM | DIASTOLIC BLOOD PRESSURE: 44 MMHG | HEART RATE: 98 BPM | HEIGHT: 65 IN | BODY MASS INDEX: 21.23 KG/M2 | OXYGEN SATURATION: 99 %

## 2020-01-01 VITALS
DIASTOLIC BLOOD PRESSURE: 76 MMHG | WEIGHT: 117 LBS | HEIGHT: 65 IN | HEART RATE: 78 BPM | SYSTOLIC BLOOD PRESSURE: 108 MMHG | BODY MASS INDEX: 19.49 KG/M2

## 2020-01-01 DIAGNOSIS — Z99.89 WALKER AS AMBULATION AID: ICD-10-CM

## 2020-01-01 DIAGNOSIS — E78.2 MIXED HYPERLIPIDEMIA: ICD-10-CM

## 2020-01-01 DIAGNOSIS — M17.11 PRIMARY OSTEOARTHRITIS OF RIGHT KNEE: ICD-10-CM

## 2020-01-01 DIAGNOSIS — M19.011 GLENOHUMERAL ARTHRITIS, RIGHT: Primary | ICD-10-CM

## 2020-01-01 DIAGNOSIS — R30.0 DYSURIA: Primary | ICD-10-CM

## 2020-01-01 DIAGNOSIS — L29.9 ITCHING: ICD-10-CM

## 2020-01-01 DIAGNOSIS — R09.89 BILATERAL CAROTID BRUITS: ICD-10-CM

## 2020-01-01 DIAGNOSIS — I10 ESSENTIAL HYPERTENSION: ICD-10-CM

## 2020-01-01 DIAGNOSIS — N30.90 CYSTITIS: Primary | ICD-10-CM

## 2020-01-01 DIAGNOSIS — N30.00 ACUTE CYSTITIS WITHOUT HEMATURIA: Primary | ICD-10-CM

## 2020-01-01 DIAGNOSIS — Z86.73 HISTORY OF TIA (TRANSIENT ISCHEMIC ATTACK): ICD-10-CM

## 2020-01-01 DIAGNOSIS — J30.1 ACUTE SEASONAL ALLERGIC RHINITIS DUE TO POLLEN: ICD-10-CM

## 2020-01-01 DIAGNOSIS — Z91.199 NO-SHOW FOR APPOINTMENT: Primary | ICD-10-CM

## 2020-01-01 DIAGNOSIS — I63.9 STROKE: ICD-10-CM

## 2020-01-01 DIAGNOSIS — I63.9 CEREBROVASCULAR ACCIDENT (CVA), UNSPECIFIED MECHANISM: ICD-10-CM

## 2020-01-01 DIAGNOSIS — G40.901 SEIZURE DISORDER, STATUS EPILEPTICUS, NONCONVULSIVE: ICD-10-CM
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
## 2020-01-01 DIAGNOSIS — I21.3 STEMI (ST ELEVATION MYOCARDIAL INFARCTION): ICD-10-CM

## 2020-01-01 LAB
ALBUMIN SERPL BCP-MCNC: 1.9 G/DL (ref 3.5–5.2)
ALBUMIN SERPL BCP-MCNC: 2.7 G/DL (ref 3.5–5.2)
ALBUMIN SERPL BCP-MCNC: 3.1 G/DL (ref 3.5–5.2)
ALP SERPL-CCNC: 59 U/L (ref 55–135)
ALP SERPL-CCNC: 71 U/L (ref 55–135)
ALP SERPL-CCNC: 75 U/L (ref 55–135)
ALT SERPL W/O P-5'-P-CCNC: 59 U/L (ref 10–44)
ALT SERPL W/O P-5'-P-CCNC: 74 U/L (ref 10–44)
ALT SERPL W/O P-5'-P-CCNC: 74 U/L (ref 10–44)
ANION GAP SERPL CALC-SCNC: 13 MMOL/L (ref 8–16)
ANION GAP SERPL CALC-SCNC: 14 MMOL/L (ref 8–16)
ANION GAP SERPL CALC-SCNC: 15 MMOL/L (ref 8–16)
ANION GAP SERPL CALC-SCNC: 17 MMOL/L (ref 8–16)
ANION GAP SERPL CALC-SCNC: 18 MMOL/L (ref 8–16)
AORTIC ROOT ANNULUS: 2.37 CM
AORTIC VALVE CUSP SEPERATION: 1.05 CM
APPEARANCE UR: ABNORMAL
APTT BLDCRRT: 24.7 SEC (ref 21–32)
AST SERPL-CCNC: 118 U/L (ref 10–40)
AST SERPL-CCNC: 179 U/L (ref 10–40)
AST SERPL-CCNC: 211 U/L (ref 10–40)
AV INDEX (PROSTH): 0.41
AV MEAN GRADIENT: 11 MMHG
AV PEAK GRADIENT: 22 MMHG
AV VALVE AREA: 1.28 CM2
AV VELOCITY RATIO: 0.41
BACTERIA #/AREA URNS HPF: ABNORMAL /HPF
BACTERIA UR CULT: ABNORMAL
BACTERIA UR CULT: ABNORMAL
BASOPHILS # BLD AUTO: 0.05 K/UL (ref 0–0.2)
BASOPHILS NFR BLD: 0 % (ref 0–1.9)
BASOPHILS NFR BLD: 0 % (ref 0–1.9)
BASOPHILS NFR BLD: 0.3 % (ref 0–1.9)
BILIRUB SERPL-MCNC: 0.4 MG/DL (ref 0.1–1)
BILIRUB UR QL STRIP: NEGATIVE
BILIRUB UR QL STRIP: NEGATIVE
BSA FOR ECHO PROCEDURE: 1.51 M2
BUN SERPL-MCNC: 53 MG/DL (ref 10–30)
BUN SERPL-MCNC: 62 MG/DL (ref 10–30)
BUN SERPL-MCNC: 68 MG/DL (ref 10–30)
BUN SERPL-MCNC: 71 MG/DL (ref 10–30)
BUN SERPL-MCNC: 72 MG/DL (ref 10–30)
BUN SERPL-MCNC: 80 MG/DL (ref 10–30)
BUN SERPL-MCNC: 82 MG/DL (ref 10–30)
CALCIUM SERPL-MCNC: 8.5 MG/DL (ref 8.7–10.5)
CALCIUM SERPL-MCNC: 8.5 MG/DL (ref 8.7–10.5)
CALCIUM SERPL-MCNC: 8.6 MG/DL (ref 8.7–10.5)
CALCIUM SERPL-MCNC: 8.8 MG/DL (ref 8.7–10.5)
CALCIUM SERPL-MCNC: 9 MG/DL (ref 8.7–10.5)
CALCIUM SERPL-MCNC: 9.3 MG/DL (ref 8.7–10.5)
CALCIUM SERPL-MCNC: 9.6 MG/DL (ref 8.7–10.5)
CHLORIDE SERPL-SCNC: 104 MMOL/L (ref 95–110)
CHLORIDE SERPL-SCNC: 107 MMOL/L (ref 95–110)
CHLORIDE SERPL-SCNC: 109 MMOL/L (ref 95–110)
CHLORIDE SERPL-SCNC: 110 MMOL/L (ref 95–110)
CHLORIDE SERPL-SCNC: 113 MMOL/L (ref 95–110)
CHOLEST SERPL-MCNC: 136 MG/DL (ref 120–199)
CHOLEST/HDLC SERPL: 3.3 {RATIO} (ref 2–5)
CK SERPL-CCNC: 7690 U/L (ref 20–180)
CLARITY UR: CLEAR
CO2 SERPL-SCNC: 16 MMOL/L (ref 23–29)
CO2 SERPL-SCNC: 17 MMOL/L (ref 23–29)
CO2 SERPL-SCNC: 18 MMOL/L (ref 23–29)
CO2 SERPL-SCNC: 18 MMOL/L (ref 23–29)
CO2 SERPL-SCNC: 20 MMOL/L (ref 23–29)
CO2 SERPL-SCNC: 21 MMOL/L (ref 23–29)
CO2 SERPL-SCNC: 21 MMOL/L (ref 23–29)
COLOR UR: YELLOW
COLOR UR: YELLOW
CREAT SERPL-MCNC: 2.3 MG/DL (ref 0.5–1.4)
CREAT SERPL-MCNC: 2.6 MG/DL (ref 0.5–1.4)
CREAT SERPL-MCNC: 2.9 MG/DL (ref 0.5–1.4)
CREAT SERPL-MCNC: 2.9 MG/DL (ref 0.5–1.4)
CREAT SERPL-MCNC: 3 MG/DL (ref 0.5–1.4)
CREAT SERPL-MCNC: 3.1 MG/DL (ref 0.5–1.4)
CREAT SERPL-MCNC: 3.2 MG/DL (ref 0.5–1.4)
CREAT UR-MCNC: 124 MG/DL (ref 15–325)
CV ECHO LV RWT: 0.39 CM
DIFFERENTIAL METHOD: ABNORMAL
DOP CALC AO PEAK VEL: 2.36 M/S
DOP CALC AO VTI: 36.62 CM
DOP CALC LVOT AREA: 3.1 CM2
DOP CALC LVOT DIAMETER: 1.99 CM
DOP CALC LVOT PEAK VEL: 0.97 M/S
DOP CALC LVOT STROKE VOLUME: 46.91 CM3
DOP CALCLVOT PEAK VEL VTI: 15.09 CM
E WAVE DECELERATION TIME: 135.59 MSEC
E/A RATIO: 0.62
E/E' RATIO: 15.33 M/S
ECHO LV POSTERIOR WALL: 0.86 CM (ref 0.6–1.1)
EOSINOPHIL # BLD AUTO: 0.1 K/UL (ref 0–0.5)
EOSINOPHIL NFR BLD: 0 % (ref 0–8)
EOSINOPHIL NFR BLD: 0 % (ref 0–8)
EOSINOPHIL NFR BLD: 0.6 % (ref 0–8)
ERYTHROCYTE [DISTWIDTH] IN BLOOD BY AUTOMATED COUNT: 13.8 % (ref 11.5–14.5)
ERYTHROCYTE [DISTWIDTH] IN BLOOD BY AUTOMATED COUNT: 14.2 % (ref 11.5–14.5)
ERYTHROCYTE [DISTWIDTH] IN BLOOD BY AUTOMATED COUNT: 14.5 % (ref 11.5–14.5)
EST. GFR  (AFRICAN AMERICAN): 14 ML/MIN/1.73 M^2
EST. GFR  (AFRICAN AMERICAN): 14 ML/MIN/1.73 M^2
EST. GFR  (AFRICAN AMERICAN): 15 ML/MIN/1.73 M^2
EST. GFR  (AFRICAN AMERICAN): 18 ML/MIN/1.73 M^2
EST. GFR  (AFRICAN AMERICAN): 21 ML/MIN/1.73 M^2
EST. GFR  (NON AFRICAN AMERICAN): 12 ML/MIN/1.73 M^2
EST. GFR  (NON AFRICAN AMERICAN): 12 ML/MIN/1.73 M^2
EST. GFR  (NON AFRICAN AMERICAN): 13 ML/MIN/1.73 M^2
EST. GFR  (NON AFRICAN AMERICAN): 15 ML/MIN/1.73 M^2
EST. GFR  (NON AFRICAN AMERICAN): 18 ML/MIN/1.73 M^2
ESTIMATED AVG GLUCOSE: 114 MG/DL (ref 68–131)
FRACTIONAL SHORTENING: 25 % (ref 28–44)
GLUCOSE SERPL-MCNC: 102 MG/DL (ref 70–110)
GLUCOSE SERPL-MCNC: 107 MG/DL (ref 70–110)
GLUCOSE SERPL-MCNC: 122 MG/DL (ref 70–110)
GLUCOSE SERPL-MCNC: 139 MG/DL (ref 70–110)
GLUCOSE SERPL-MCNC: 144 MG/DL (ref 70–110)
GLUCOSE SERPL-MCNC: 173 MG/DL (ref 70–110)
GLUCOSE SERPL-MCNC: 196 MG/DL (ref 70–110)
GLUCOSE SERPL-MCNC: 88 MG/DL (ref 70–110)
GLUCOSE UR QL STRIP: NEGATIVE
GLUCOSE UR QL STRIP: NEGATIVE
HBA1C MFR BLD HPLC: 5.6 % (ref 4–5.6)
HCT VFR BLD AUTO: 31.1 % (ref 37–48.5)
HCT VFR BLD AUTO: 37.7 % (ref 37–48.5)
HCT VFR BLD AUTO: 37.8 % (ref 37–48.5)
HDLC SERPL-MCNC: 41 MG/DL (ref 40–75)
HDLC SERPL: 30.1 % (ref 20–50)
HGB BLD-MCNC: 11.2 G/DL (ref 12–16)
HGB BLD-MCNC: 12.1 G/DL (ref 12–16)
HGB BLD-MCNC: 9.7 G/DL (ref 12–16)
HGB UR QL STRIP: ABNORMAL
HGB UR QL STRIP: NEGATIVE
HYALINE CASTS #/AREA URNS LPF: ABNORMAL /LPF
IMM GRANULOCYTES # BLD AUTO: 0.11 K/UL (ref 0–0.04)
IMM GRANULOCYTES # BLD AUTO: ABNORMAL K/UL (ref 0–0.04)
IMM GRANULOCYTES # BLD AUTO: ABNORMAL K/UL (ref 0–0.04)
IMM GRANULOCYTES NFR BLD AUTO: 0.6 % (ref 0–0.5)
IMM GRANULOCYTES NFR BLD AUTO: ABNORMAL % (ref 0–0.5)
IMM GRANULOCYTES NFR BLD AUTO: ABNORMAL % (ref 0–0.5)
INR PPP: 1 (ref 0.8–1.2)
INR PPP: 1.1 (ref 0.8–1.2)
INTERPRETATION UR IFE-IMP: NORMAL
INTERVENTRICULAR SEPTUM: 0.44 CM (ref 0.6–1.1)
KETONES UR QL STRIP: NEGATIVE
KETONES UR QL STRIP: NEGATIVE
LDLC SERPL CALC-MCNC: 76.4 MG/DL (ref 63–159)
LEFT ATRIUM SIZE: 2.13 CM
LEFT INTERNAL DIMENSION IN SYSTOLE: 3.31 CM (ref 2.1–4)
LEFT VENTRICLE DIASTOLIC VOLUME INDEX: 54.55 ML/M2
LEFT VENTRICLE DIASTOLIC VOLUME: 89.09 ML
LEFT VENTRICLE MASS INDEX: 52 G/M2
LEFT VENTRICLE SYSTOLIC VOLUME INDEX: 27.1 ML/M2
LEFT VENTRICLE SYSTOLIC VOLUME: 44.33 ML
LEFT VENTRICULAR INTERNAL DIMENSION IN DIASTOLE: 4.43 CM (ref 3.5–6)
LEFT VENTRICULAR MASS: 84.79 G
LEUKOCYTE ESTERASE UR QL STRIP: ABNORMAL
LEUKOCYTE ESTERASE UR QL STRIP: ABNORMAL
LV LATERAL E/E' RATIO: 11.5 M/S
LV SEPTAL E/E' RATIO: 23 M/S
LYMPHOCYTES # BLD AUTO: 2.6 K/UL (ref 1–4.8)
LYMPHOCYTES NFR BLD: 13.5 % (ref 18–48)
LYMPHOCYTES NFR BLD: 25 % (ref 18–48)
LYMPHOCYTES NFR BLD: 8 % (ref 18–48)
MAGNESIUM SERPL-MCNC: 2.4 MG/DL (ref 1.6–2.6)
MAGNESIUM SERPL-MCNC: 2.4 MG/DL (ref 1.6–2.6)
MCH RBC QN AUTO: 28.3 PG (ref 27–31)
MCH RBC QN AUTO: 28.6 PG (ref 27–31)
MCH RBC QN AUTO: 28.8 PG (ref 27–31)
MCHC RBC AUTO-ENTMCNC: 29.7 G/DL (ref 32–36)
MCHC RBC AUTO-ENTMCNC: 31.2 G/DL (ref 32–36)
MCHC RBC AUTO-ENTMCNC: 32 G/DL (ref 32–36)
MCV RBC AUTO: 90 FL (ref 82–98)
MCV RBC AUTO: 92 FL (ref 82–98)
MCV RBC AUTO: 95 FL (ref 82–98)
METAMYELOCYTES NFR BLD MANUAL: 9 %
MICROSCOPIC COMMENT: NORMAL
MONOCYTES # BLD AUTO: 1.7 K/UL (ref 0.3–1)
MONOCYTES NFR BLD: 2 % (ref 4–15)
MONOCYTES NFR BLD: 3 % (ref 4–15)
MONOCYTES NFR BLD: 9 % (ref 4–15)
MV A" WAVE DURATION": 148 MSEC
MV PEAK A VEL: 1.11 M/S
MV PEAK E VEL: 0.69 M/S
MV STENOSIS PRESSURE HALF TIME: 78.32 MS
MV VALVE AREA P 1/2 METHOD: 2.81 CM2
MYELOCYTES NFR BLD MANUAL: 2 %
NEUTROPHILS # BLD AUTO: 14.6 K/UL (ref 1.8–7.7)
NEUTROPHILS NFR BLD: 19 % (ref 38–73)
NEUTROPHILS NFR BLD: 60 % (ref 38–73)
NEUTROPHILS NFR BLD: 76 % (ref 38–73)
NEUTS BAND NFR BLD MANUAL: 30 %
NEUTS BAND NFR BLD MANUAL: 42 %
NITRITE UR QL STRIP: NEGATIVE
NITRITE UR QL STRIP: NEGATIVE
NONHDLC SERPL-MCNC: 95 MG/DL
NRBC BLD-RTO: 0 /100 WBC
PATHOLOGIST INTERPRETATION UIFE: NORMAL
PH UR STRIP: 6.5 [PH] (ref 5–8)
PH UR STRIP: 7 [PH] (ref 5–8)
PHOSPHATE SERPL-MCNC: 4.6 MG/DL (ref 2.7–4.5)
PISA MRMAX VEL: 0.05 M/S
PISA TR MAX VEL: 2.73 M/S
PLATELET # BLD AUTO: 132 K/UL (ref 150–350)
PLATELET # BLD AUTO: 133 K/UL (ref 150–350)
PLATELET # BLD AUTO: 165 K/UL (ref 150–350)
PLATELET BLD QL SMEAR: ABNORMAL
PLATELET BLD QL SMEAR: ABNORMAL
PMV BLD AUTO: 14.1 FL (ref 9.2–12.9)
PMV BLD AUTO: 14.7 FL (ref 9.2–12.9)
PMV BLD AUTO: ABNORMAL FL (ref 9.2–12.9)
POCT GLUCOSE: 139 MG/DL (ref 70–110)
POCT GLUCOSE: 156 MG/DL (ref 70–110)
POCT GLUCOSE: 173 MG/DL (ref 70–110)
POCT GLUCOSE: 247 MG/DL (ref 70–110)
POCT GLUCOSE: 61 MG/DL (ref 70–110)
POTASSIUM SERPL-SCNC: 5.2 MMOL/L (ref 3.5–5.1)
POTASSIUM SERPL-SCNC: 5.3 MMOL/L (ref 3.5–5.1)
POTASSIUM SERPL-SCNC: 5.5 MMOL/L (ref 3.5–5.1)
POTASSIUM SERPL-SCNC: 5.6 MMOL/L (ref 3.5–5.1)
POTASSIUM SERPL-SCNC: 5.7 MMOL/L (ref 3.5–5.1)
POTASSIUM SERPL-SCNC: 6 MMOL/L (ref 3.5–5.1)
POTASSIUM SERPL-SCNC: 6.3 MMOL/L (ref 3.5–5.1)
PROT SERPL-MCNC: 5.1 G/DL (ref 6–8.4)
PROT SERPL-MCNC: 6.1 G/DL (ref 6–8.4)
PROT SERPL-MCNC: 6.5 G/DL (ref 6–8.4)
PROT UR QL STRIP: ABNORMAL
PROT UR QL STRIP: ABNORMAL
PROT UR-MCNC: 90 MG/DL (ref 0–15)
PROTHROMBIN TIME: 11.4 SEC (ref 9–12.5)
PROTHROMBIN TIME: 11.8 SEC (ref 9–12.5)
PV PEAK VELOCITY: 0.79 CM/S
RA PRESSURE: 3 MMHG
RBC # BLD AUTO: 3.39 M/UL (ref 4–5.4)
RBC # BLD AUTO: 3.96 M/UL (ref 4–5.4)
RBC # BLD AUTO: 4.2 M/UL (ref 4–5.4)
RBC #/AREA URNS HPF: 3 /HPF (ref 0–4)
RBC #/AREA URNS HPF: ABNORMAL /HPF
SARS-COV-2 RDRP RESP QL NAA+PROBE: NEGATIVE
SODIUM SERPL-SCNC: 138 MMOL/L (ref 136–145)
SODIUM SERPL-SCNC: 139 MMOL/L (ref 136–145)
SODIUM SERPL-SCNC: 141 MMOL/L (ref 136–145)
SODIUM SERPL-SCNC: 142 MMOL/L (ref 136–145)
SODIUM SERPL-SCNC: 143 MMOL/L (ref 136–145)
SODIUM SERPL-SCNC: 144 MMOL/L (ref 136–145)
SODIUM SERPL-SCNC: 144 MMOL/L (ref 136–145)
SODIUM UR-SCNC: 36 MMOL/L (ref 20–250)
SP GR UR STRIP: 1.01 (ref 1–1.03)
SP GR UR STRIP: 1.02 (ref 1–1.03)
SQUAMOUS #/AREA URNS HPF: ABNORMAL /HPF
T4 FREE SERPL-MCNC: 1.12 NG/DL (ref 0.71–1.51)
TDI LATERAL: 0.06 M/S
TDI SEPTAL: 0.03 M/S
TDI: 0.05 M/S
TR MAX PG: 30 MMHG
TRIGL SERPL-MCNC: 93 MG/DL (ref 30–150)
TROPONIN I SERPL DL<=0.01 NG/ML-MCNC: 0.22 NG/ML (ref 0–0.03)
TROPONIN I SERPL DL<=0.01 NG/ML-MCNC: 0.4 NG/ML (ref 0–0.03)
TSH SERPL DL<=0.005 MIU/L-ACNC: 5.43 UIU/ML (ref 0.4–4)
TV REST PULMONARY ARTERY PRESSURE: 33 MMHG
URN SPEC COLLECT METH UR: ABNORMAL
UROBILINOGEN UR STRIP-ACNC: NEGATIVE EU/DL
WBC # BLD AUTO: 14.61 K/UL (ref 3.9–12.7)
WBC # BLD AUTO: 19.19 K/UL (ref 3.9–12.7)
WBC # BLD AUTO: 9.48 K/UL (ref 3.9–12.7)
WBC #/AREA URNS HPF: 4 /HPF (ref 0–5)
WBC #/AREA URNS HPF: ABNORMAL /HPF
WBC CLUMPS URNS QL MICRO: NORMAL

## 2020-01-01 PROCEDURE — 63600175 PHARM REV CODE 636 W HCPCS: Performed by: INTERNAL MEDICINE

## 2020-01-01 PROCEDURE — 97161 PT EVAL LOW COMPLEX 20 MIN: CPT

## 2020-01-01 PROCEDURE — 12000002 HC ACUTE/MED SURGE SEMI-PRIVATE ROOM

## 2020-01-01 PROCEDURE — 25000242 PHARM REV CODE 250 ALT 637 W/ HCPCS: Performed by: NURSE PRACTITIONER

## 2020-01-01 PROCEDURE — 51702 INSERT TEMP BLADDER CATH: CPT

## 2020-01-01 PROCEDURE — 93010 EKG 12-LEAD: ICD-10-PCS | Mod: ,,, | Performed by: INTERNAL MEDICINE

## 2020-01-01 PROCEDURE — 36415 COLL VENOUS BLD VENIPUNCTURE: CPT

## 2020-01-01 PROCEDURE — 99213 PR OFFICE/OUTPT VISIT, EST, LEVL III, 20-29 MIN: ICD-10-PCS | Mod: 25,S$GLB,, | Performed by: PHYSICIAN ASSISTANT

## 2020-01-01 PROCEDURE — 27000221 HC OXYGEN, UP TO 24 HOURS

## 2020-01-01 PROCEDURE — G0425 PR INPT TELEHEALTH CONSULT 30M: ICD-10-PCS | Mod: 95,G0,, | Performed by: PSYCHIATRY & NEUROLOGY

## 2020-01-01 PROCEDURE — 85007 BL SMEAR W/DIFF WBC COUNT: CPT

## 2020-01-01 PROCEDURE — 84156 ASSAY OF PROTEIN URINE: CPT

## 2020-01-01 PROCEDURE — 85610 PROTHROMBIN TIME: CPT

## 2020-01-01 PROCEDURE — 93010 ELECTROCARDIOGRAM REPORT: CPT | Mod: ,,, | Performed by: INTERNAL MEDICINE

## 2020-01-01 PROCEDURE — 94761 N-INVAS EAR/PLS OXIMETRY MLT: CPT

## 2020-01-01 PROCEDURE — 84443 ASSAY THYROID STIM HORMONE: CPT

## 2020-01-01 PROCEDURE — 99213 OFFICE O/P EST LOW 20 MIN: CPT | Mod: 25,S$GLB,, | Performed by: PHYSICIAN ASSISTANT

## 2020-01-01 PROCEDURE — 20610 LARGE JOINT ASPIRATION/INJECTION: R KNEE: ICD-10-PCS | Mod: RT,S$GLB,, | Performed by: PHYSICIAN ASSISTANT

## 2020-01-01 PROCEDURE — 80061 LIPID PANEL: CPT

## 2020-01-01 PROCEDURE — 85730 THROMBOPLASTIN TIME PARTIAL: CPT

## 2020-01-01 PROCEDURE — U0002 COVID-19 LAB TEST NON-CDC: HCPCS

## 2020-01-01 PROCEDURE — 86335 PATHOLOGIST INTERPRETATION UIFE: ICD-10-PCS | Mod: 26,,, | Performed by: PATHOLOGY

## 2020-01-01 PROCEDURE — 80048 BASIC METABOLIC PNL TOTAL CA: CPT

## 2020-01-01 PROCEDURE — 63600175 PHARM REV CODE 636 W HCPCS: Performed by: NURSE PRACTITIONER

## 2020-01-01 PROCEDURE — 83036 HEMOGLOBIN GLYCOSYLATED A1C: CPT

## 2020-01-01 PROCEDURE — 25000003 PHARM REV CODE 250: Performed by: NURSE PRACTITIONER

## 2020-01-01 PROCEDURE — 82550 ASSAY OF CK (CPK): CPT

## 2020-01-01 PROCEDURE — 80048 BASIC METABOLIC PNL TOTAL CA: CPT | Mod: 91

## 2020-01-01 PROCEDURE — G0425 INPT/ED TELECONSULT30: HCPCS | Mod: 95,G0,, | Performed by: PSYCHIATRY & NEUROLOGY

## 2020-01-01 PROCEDURE — 83735 ASSAY OF MAGNESIUM: CPT | Mod: 91

## 2020-01-01 PROCEDURE — 93005 ELECTROCARDIOGRAM TRACING: CPT

## 2020-01-01 PROCEDURE — 84300 ASSAY OF URINE SODIUM: CPT

## 2020-01-01 PROCEDURE — 85027 COMPLETE CBC AUTOMATED: CPT

## 2020-01-01 PROCEDURE — 86335 IMMUNFIX E-PHORSIS/URINE/CSF: CPT

## 2020-01-01 PROCEDURE — 20610 DRAIN/INJ JOINT/BURSA W/O US: CPT | Mod: 51,RT,S$GLB, | Performed by: PHYSICIAN ASSISTANT

## 2020-01-01 PROCEDURE — 85025 COMPLETE CBC W/AUTO DIFF WBC: CPT

## 2020-01-01 PROCEDURE — 84100 ASSAY OF PHOSPHORUS: CPT

## 2020-01-01 PROCEDURE — 80053 COMPREHEN METABOLIC PANEL: CPT

## 2020-01-01 PROCEDURE — 84439 ASSAY OF FREE THYROXINE: CPT

## 2020-01-01 PROCEDURE — 81000 URINALYSIS NONAUTO W/SCOPE: CPT

## 2020-01-01 PROCEDURE — 20610 DRAIN/INJ JOINT/BURSA W/O US: CPT | Mod: RT,S$GLB,, | Performed by: PHYSICIAN ASSISTANT

## 2020-01-01 PROCEDURE — 92610 EVALUATE SWALLOWING FUNCTION: CPT

## 2020-01-01 PROCEDURE — 99442 PR PHYSICIAN TELEPHONE EVALUATION 11-20 MIN: ICD-10-PCS | Mod: 95,,, | Performed by: FAMILY MEDICINE

## 2020-01-01 PROCEDURE — 96374 THER/PROPH/DIAG INJ IV PUSH: CPT

## 2020-01-01 PROCEDURE — 25000003 PHARM REV CODE 250: Performed by: INTERNAL MEDICINE

## 2020-01-01 PROCEDURE — 83735 ASSAY OF MAGNESIUM: CPT

## 2020-01-01 PROCEDURE — 82570 ASSAY OF URINE CREATININE: CPT

## 2020-01-01 PROCEDURE — 95816 EEG AWAKE AND DROWSY: CPT

## 2020-01-01 PROCEDURE — 99442 PR PHYSICIAN TELEPHONE EVALUATION 11-20 MIN: CPT | Mod: 95,,, | Performed by: FAMILY MEDICINE

## 2020-01-01 PROCEDURE — 29505 APPLICATION LONG LEG SPLINT: CPT | Mod: RT

## 2020-01-01 PROCEDURE — 95822 EEG COMA OR SLEEP ONLY: CPT | Mod: 26,,, | Performed by: PSYCHIATRY & NEUROLOGY

## 2020-01-01 PROCEDURE — 97166 OT EVAL MOD COMPLEX 45 MIN: CPT

## 2020-01-01 PROCEDURE — 63600175 PHARM REV CODE 636 W HCPCS: Performed by: EMERGENCY MEDICINE

## 2020-01-01 PROCEDURE — 11000001 HC ACUTE MED/SURG PRIVATE ROOM

## 2020-01-01 PROCEDURE — 99285 EMERGENCY DEPT VISIT HI MDM: CPT | Mod: 25

## 2020-01-01 PROCEDURE — 25000003 PHARM REV CODE 250: Performed by: EMERGENCY MEDICINE

## 2020-01-01 PROCEDURE — 97802 MEDICAL NUTRITION INDIV IN: CPT

## 2020-01-01 PROCEDURE — 84484 ASSAY OF TROPONIN QUANT: CPT

## 2020-01-01 PROCEDURE — 86335 IMMUNFIX E-PHORSIS/URINE/CSF: CPT | Mod: 26,,, | Performed by: PATHOLOGY

## 2020-01-01 PROCEDURE — 95822 PR EEG,COMA/SLEEP RECORD ONLY: ICD-10-PCS | Mod: 26,,, | Performed by: PSYCHIATRY & NEUROLOGY

## 2020-01-01 PROCEDURE — 82962 GLUCOSE BLOOD TEST: CPT

## 2020-01-01 RX ORDER — ACETAMINOPHEN 10 MG/ML
1000 INJECTION, SOLUTION INTRAVENOUS EVERY 8 HOURS
Status: COMPLETED | OUTPATIENT
Start: 2020-01-01 | End: 2020-01-01

## 2020-01-01 RX ORDER — ONDANSETRON 2 MG/ML
4 INJECTION INTRAMUSCULAR; INTRAVENOUS EVERY 12 HOURS PRN
Status: DISCONTINUED | OUTPATIENT
Start: 2020-01-01 | End: 2020-01-01 | Stop reason: HOSPADM

## 2020-01-01 RX ORDER — PRAVASTATIN SODIUM 10 MG/1
10 TABLET ORAL DAILY
Qty: 90 TABLET | Refills: 1 | Status: SHIPPED | OUTPATIENT
Start: 2020-01-01

## 2020-01-01 RX ORDER — PANTOPRAZOLE SODIUM 40 MG/1
40 TABLET, DELAYED RELEASE ORAL DAILY
Status: DISCONTINUED | OUTPATIENT
Start: 2020-01-01 | End: 2020-01-01 | Stop reason: SDUPTHER

## 2020-01-01 RX ORDER — SODIUM CHLORIDE 0.9 % (FLUSH) 0.9 %
10 SYRINGE (ML) INJECTION
Status: DISCONTINUED | OUTPATIENT
Start: 2020-01-01 | End: 2020-01-01 | Stop reason: HOSPADM

## 2020-01-01 RX ORDER — LISINOPRIL 20 MG/1
20 TABLET ORAL DAILY
Qty: 90 TABLET | Refills: 1 | Status: SHIPPED | OUTPATIENT
Start: 2020-01-01

## 2020-01-01 RX ORDER — SODIUM CHLORIDE 9 MG/ML
INJECTION, SOLUTION INTRAVENOUS CONTINUOUS
Status: DISCONTINUED | OUTPATIENT
Start: 2020-01-01 | End: 2020-01-01 | Stop reason: HOSPADM

## 2020-01-01 RX ORDER — LORAZEPAM 2 MG/ML
1 INJECTION INTRAMUSCULAR
Status: DISCONTINUED | OUTPATIENT
Start: 2020-01-01 | End: 2020-01-01

## 2020-01-01 RX ORDER — FLUTICASONE PROPIONATE 50 MCG
1 SPRAY, SUSPENSION (ML) NASAL DAILY
Status: DISCONTINUED | OUTPATIENT
Start: 2020-01-01 | End: 2020-01-01 | Stop reason: HOSPADM

## 2020-01-01 RX ORDER — SODIUM CHLORIDE 9 MG/ML
INJECTION, SOLUTION INTRAVENOUS CONTINUOUS
Status: DISCONTINUED | OUTPATIENT
Start: 2020-01-01 | End: 2020-01-01

## 2020-01-01 RX ORDER — PRAMIPEXOLE DIHYDROCHLORIDE 0.12 MG/1
0.12 TABLET ORAL NIGHTLY PRN
Status: DISCONTINUED | OUTPATIENT
Start: 2020-01-01 | End: 2020-01-01 | Stop reason: HOSPADM

## 2020-01-01 RX ORDER — METHYLPREDNISOLONE ACETATE 40 MG/ML
40 INJECTION, SUSPENSION INTRA-ARTICULAR; INTRALESIONAL; INTRAMUSCULAR; SOFT TISSUE
Status: DISCONTINUED | OUTPATIENT
Start: 2020-01-01 | End: 2020-01-01 | Stop reason: HOSPADM

## 2020-01-01 RX ORDER — FAMOTIDINE 20 MG/1
20 TABLET, FILM COATED ORAL DAILY
Status: DISCONTINUED | OUTPATIENT
Start: 2020-01-01 | End: 2020-01-01 | Stop reason: HOSPADM

## 2020-01-01 RX ORDER — HYDROXYZINE HYDROCHLORIDE 10 MG/1
10 TABLET, FILM COATED ORAL NIGHTLY PRN
Qty: 90 TABLET | Refills: 1 | Status: SHIPPED | OUTPATIENT
Start: 2020-01-01

## 2020-01-01 RX ORDER — SODIUM BICARBONATE 1 MEQ/ML
50 SYRINGE (ML) INTRAVENOUS ONCE
Status: COMPLETED | OUTPATIENT
Start: 2020-01-01 | End: 2020-01-01

## 2020-01-01 RX ORDER — FUROSEMIDE 10 MG/ML
40 INJECTION INTRAMUSCULAR; INTRAVENOUS ONCE
Status: COMPLETED | OUTPATIENT
Start: 2020-01-01 | End: 2020-01-01

## 2020-01-01 RX ORDER — CALCIUM GLUCONATE 20 MG/ML
1 INJECTION, SOLUTION INTRAVENOUS ONCE
Status: COMPLETED | OUTPATIENT
Start: 2020-01-01 | End: 2020-01-01

## 2020-01-01 RX ORDER — LORAZEPAM 2 MG/ML
1 INJECTION INTRAMUSCULAR
Status: DISCONTINUED | OUTPATIENT
Start: 2020-01-01 | End: 2020-01-01 | Stop reason: HOSPADM

## 2020-01-01 RX ORDER — ASPIRIN 81 MG/1
162 TABLET ORAL DAILY
Status: DISCONTINUED | OUTPATIENT
Start: 2020-01-01 | End: 2020-01-01 | Stop reason: HOSPADM

## 2020-01-01 RX ORDER — ONDANSETRON 2 MG/ML
4 INJECTION INTRAMUSCULAR; INTRAVENOUS
Status: COMPLETED | OUTPATIENT
Start: 2020-01-01 | End: 2020-01-01

## 2020-01-01 RX ORDER — SCOLOPAMINE TRANSDERMAL SYSTEM 1 MG/1
1 PATCH, EXTENDED RELEASE TRANSDERMAL
Status: CANCELLED | OUTPATIENT
Start: 2020-10-26

## 2020-01-01 RX ORDER — LORAZEPAM 2 MG/ML
1 INJECTION INTRAMUSCULAR
Status: CANCELLED | OUTPATIENT
Start: 2020-01-01

## 2020-01-01 RX ORDER — MUPIROCIN 20 MG/G
OINTMENT TOPICAL 2 TIMES DAILY
Status: DISCONTINUED | OUTPATIENT
Start: 2020-01-01 | End: 2020-01-01 | Stop reason: HOSPADM

## 2020-01-01 RX ORDER — PRAVASTATIN SODIUM 10 MG/1
10 TABLET ORAL DAILY
Qty: 90 TABLET | Refills: 1 | Status: SHIPPED | OUTPATIENT
Start: 2020-01-01 | End: 2020-01-01 | Stop reason: SDUPTHER

## 2020-01-01 RX ORDER — HYDROXYZINE HYDROCHLORIDE 10 MG/1
10 TABLET, FILM COATED ORAL NIGHTLY PRN
Status: DISCONTINUED | OUTPATIENT
Start: 2020-01-01 | End: 2020-01-01 | Stop reason: HOSPADM

## 2020-01-01 RX ORDER — SCOLOPAMINE TRANSDERMAL SYSTEM 1 MG/1
1 PATCH, EXTENDED RELEASE TRANSDERMAL
Status: DISCONTINUED | OUTPATIENT
Start: 2020-10-26 | End: 2020-01-01 | Stop reason: HOSPADM

## 2020-01-01 RX ORDER — LEVETIRACETAM 5 MG/ML
500 INJECTION INTRAVASCULAR EVERY 12 HOURS
Status: DISCONTINUED | OUTPATIENT
Start: 2020-01-01 | End: 2020-01-01 | Stop reason: HOSPADM

## 2020-01-01 RX ORDER — MORPHINE SULFATE 10 MG/ML
2 INJECTION INTRAMUSCULAR; INTRAVENOUS; SUBCUTANEOUS
Status: DISCONTINUED | OUTPATIENT
Start: 2020-01-01 | End: 2020-01-01 | Stop reason: SDUPTHER

## 2020-01-01 RX ORDER — PNV NO.95/FERROUS FUM/FOLIC AC 28MG-0.8MG
100 TABLET ORAL DAILY
Status: DISCONTINUED | OUTPATIENT
Start: 2020-01-01 | End: 2020-01-01 | Stop reason: HOSPADM

## 2020-01-01 RX ORDER — SCOLOPAMINE TRANSDERMAL SYSTEM 1 MG/1
1 PATCH, EXTENDED RELEASE TRANSDERMAL
Status: DISCONTINUED | OUTPATIENT
Start: 2020-01-01 | End: 2020-01-01 | Stop reason: HOSPADM

## 2020-01-01 RX ORDER — CALCIUM CHLORIDE INJECTION 100 MG/ML
1 INJECTION, SOLUTION INTRAVENOUS ONCE
Status: COMPLETED | OUTPATIENT
Start: 2020-01-01 | End: 2020-01-01

## 2020-01-01 RX ORDER — ATORVASTATIN CALCIUM 40 MG/1
40 TABLET, FILM COATED ORAL DAILY
Status: DISCONTINUED | OUTPATIENT
Start: 2020-01-01 | End: 2020-01-01 | Stop reason: HOSPADM

## 2020-01-01 RX ORDER — MORPHINE SULFATE 2 MG/ML
2 INJECTION, SOLUTION INTRAMUSCULAR; INTRAVENOUS
Status: DISCONTINUED | OUTPATIENT
Start: 2020-01-01 | End: 2020-01-01 | Stop reason: HOSPADM

## 2020-01-01 RX ORDER — BISACODYL 10 MG
10 SUPPOSITORY, RECTAL RECTAL ONCE
Status: COMPLETED | OUTPATIENT
Start: 2020-01-01 | End: 2020-01-01

## 2020-01-01 RX ORDER — FLUTICASONE PROPIONATE 50 MCG
1 SPRAY, SUSPENSION (ML) NASAL DAILY
Qty: 48 G | Refills: 1 | Status: SHIPPED | OUTPATIENT
Start: 2020-01-01

## 2020-01-01 RX ORDER — LABETALOL HYDROCHLORIDE 5 MG/ML
10 INJECTION, SOLUTION INTRAVENOUS EVERY 6 HOURS PRN
Status: DISCONTINUED | OUTPATIENT
Start: 2020-01-01 | End: 2020-01-01 | Stop reason: HOSPADM

## 2020-01-01 RX ORDER — LORAZEPAM 2 MG/ML
1 INJECTION INTRAMUSCULAR
Status: DISCONTINUED | OUTPATIENT
Start: 2020-01-01 | End: 2020-01-01 | Stop reason: SDUPTHER

## 2020-01-01 RX ORDER — DOCUSATE SODIUM 100 MG/1
100 CAPSULE, LIQUID FILLED ORAL 2 TIMES DAILY
Status: DISCONTINUED | OUTPATIENT
Start: 2020-01-01 | End: 2020-01-01 | Stop reason: HOSPADM

## 2020-01-01 RX ORDER — CIPROFLOXACIN 250 MG/1
250 TABLET, FILM COATED ORAL 2 TIMES DAILY
Qty: 6 TABLET | Refills: 0 | Status: SHIPPED | OUTPATIENT
Start: 2020-01-01 | End: 2020-01-01

## 2020-01-01 RX ORDER — LEVETIRACETAM 10 MG/ML
1000 INJECTION INTRAVASCULAR ONCE
Status: COMPLETED | OUTPATIENT
Start: 2020-01-01 | End: 2020-01-01

## 2020-01-01 RX ADMIN — CALCIUM CHLORIDE 1 G: 100 INJECTION INTRAVENOUS; INTRAVENTRICULAR at 09:10

## 2020-01-01 RX ADMIN — SCOPALAMINE 1 PATCH: 1 PATCH, EXTENDED RELEASE TRANSDERMAL at 04:10

## 2020-01-01 RX ADMIN — ACETAMINOPHEN 1000 MG: 10 INJECTION, SOLUTION INTRAVENOUS at 02:10

## 2020-01-01 RX ADMIN — SODIUM BICARBONATE 50 MEQ: 84 INJECTION, SOLUTION INTRAVENOUS at 12:10

## 2020-01-01 RX ADMIN — LORAZEPAM 1 MG: 2 INJECTION, SOLUTION INTRAMUSCULAR; INTRAVENOUS at 09:10

## 2020-01-01 RX ADMIN — DEXTROSE MONOHYDRATE 25 G: 25 INJECTION, SOLUTION INTRAVENOUS at 08:10

## 2020-01-01 RX ADMIN — SODIUM CHLORIDE: 0.9 INJECTION, SOLUTION INTRAVENOUS at 09:10

## 2020-01-01 RX ADMIN — ACETAMINOPHEN 1000 MG: 10 INJECTION, SOLUTION INTRAVENOUS at 05:10

## 2020-01-01 RX ADMIN — INSULIN HUMAN 10 UNITS: 100 INJECTION, SOLUTION PARENTERAL at 10:10

## 2020-01-01 RX ADMIN — LORAZEPAM 1 MG: 2 INJECTION, SOLUTION INTRAMUSCULAR; INTRAVENOUS at 02:10

## 2020-01-01 RX ADMIN — INSULIN HUMAN 10 UNITS: 100 INJECTION, SOLUTION PARENTERAL at 11:10

## 2020-01-01 RX ADMIN — MORPHINE SULFATE 2 MG: 2 INJECTION, SOLUTION INTRAMUSCULAR; INTRAVENOUS at 02:10

## 2020-01-01 RX ADMIN — METHYLPREDNISOLONE ACETATE 40 MG: 40 INJECTION, SUSPENSION INTRA-ARTICULAR; INTRALESIONAL; INTRAMUSCULAR; SOFT TISSUE at 08:05

## 2020-01-01 RX ADMIN — DEXTROSE MONOHYDRATE 25 G: 25 INJECTION, SOLUTION INTRAVENOUS at 11:10

## 2020-01-01 RX ADMIN — LEVETIRACETAM INJECTION 1000 MG: 10 INJECTION INTRAVENOUS at 06:10

## 2020-01-01 RX ADMIN — INSULIN HUMAN 10 UNITS: 100 INJECTION, SOLUTION PARENTERAL at 06:10

## 2020-01-01 RX ADMIN — ONDANSETRON 4 MG: 2 INJECTION INTRAMUSCULAR; INTRAVENOUS at 08:10

## 2020-01-01 RX ADMIN — SODIUM BICARBONATE 50 MEQ: 84 INJECTION, SOLUTION INTRAVENOUS at 06:10

## 2020-01-01 RX ADMIN — MORPHINE SULFATE 2 MG: 10 INJECTION INTRAVENOUS at 11:10

## 2020-01-01 RX ADMIN — ONDANSETRON 4 MG: 2 INJECTION INTRAMUSCULAR; INTRAVENOUS at 10:10

## 2020-01-01 RX ADMIN — DEXTROSE MONOHYDRATE 25 G: 25 INJECTION, SOLUTION INTRAVENOUS at 10:10

## 2020-01-01 RX ADMIN — SODIUM CHLORIDE 500 ML: 0.9 INJECTION, SOLUTION INTRAVENOUS at 07:10

## 2020-01-01 RX ADMIN — LORAZEPAM 1 MG: 2 INJECTION INTRAMUSCULAR; INTRAVENOUS at 05:10

## 2020-01-01 RX ADMIN — SODIUM CHLORIDE 1000 ML: 0.9 INJECTION, SOLUTION INTRAVENOUS at 08:10

## 2020-01-01 RX ADMIN — SODIUM CHLORIDE: 0.9 INJECTION, SOLUTION INTRAVENOUS at 11:10

## 2020-01-01 RX ADMIN — BISACODYL 10 MG: 10 SUPPOSITORY RECTAL at 02:10

## 2020-01-01 RX ADMIN — CALCIUM GLUCONATE 1000 MG: 20 INJECTION, SOLUTION INTRAVENOUS at 06:10

## 2020-01-01 RX ADMIN — MUPIROCIN: 20 OINTMENT TOPICAL at 08:10

## 2020-01-01 RX ADMIN — FLUTICASONE PROPIONATE 50 MCG: 50 SPRAY, METERED NASAL at 08:10

## 2020-01-01 RX ADMIN — SODIUM CHLORIDE: 0.9 INJECTION, SOLUTION INTRAVENOUS at 02:10

## 2020-01-01 RX ADMIN — CALCIUM GLUCONATE 1000 MG: 20 INJECTION, SOLUTION INTRAVENOUS at 11:10

## 2020-01-01 RX ADMIN — FUROSEMIDE 40 MG: 10 INJECTION, SOLUTION INTRAMUSCULAR; INTRAVENOUS at 09:10

## 2020-05-21 NOTE — PROGRESS NOTES
[facility]    Subjective:     Chief Complaint:   Chief Complaint   Patient presents with    Right Knee - Pain     Right knee pain/injection f/u. States that he injection she had on 11.21.19 did help and states that she would like to get another injection today.     Right Shoulder - Pain     Right shoulder pain x awhile states that she would like to see about getting an injection in her right knee today. States that her right shoulder is worse than her left.        Past Medical History:   Diagnosis Date    Anemia     Cataract     Disorder of kidney and ureter     Hyperlipidemia     Hypertension     Malnutrition     Osteoporosis     PUD (peptic ulcer disease)     Restless leg syndrome     Rheumatoid arthritis     TIA (transient ischemic attack) 1/23/12       Past Surgical History:   Procedure Laterality Date    ADENOIDECTOMY      FRACTURE SURGERY      left hip ORIF    HAND SURGERY      HYSTERECTOMY      complete    TONSILLECTOMY         Current Outpatient Medications   Medication Sig    aspirin (ECOTRIN) 81 MG EC tablet Take 162 mg by mouth once daily. 2 at night    cyanocobalamin (VITAMIN B-12) 1000 MCG tablet Take 100 mcg by mouth once daily.    docusate sodium (COLACE) 100 MG capsule Take 100 mg by mouth 2 (two) times daily.    fluticasone (FLONASE) 50 mcg/actuation nasal spray 1 spray (50 mcg total) by Each Nare route once daily.    ondansetron (ZOFRAN-ODT) 4 MG TbDL Take 1 tablet (4 mg total) by mouth every 6 (six) hours as needed (nausea).    pantoprazole (PROTONIX) 40 MG tablet Take 1 tablet (40 mg total) by mouth once daily.    pramipexole (MIRAPEX) 0.125 MG tablet Take 1 tablet (0.125 mg total) by mouth nightly as needed (rls).    pravastatin (PRAVACHOL) 10 MG tablet Take 1 tablet (10 mg total) by mouth once daily.    VITAMIN D2 50,000 unit capsule TAKE ONE CAPSULE BY MOUTH ONE TIME PER WEEK    lisinopril (PRINIVIL,ZESTRIL) 20 MG tablet Take 1 tablet (20 mg total) by  mouth once daily.    triamcinolone acetonide 0.1% (KENALOG) 0.1 % cream Apply topically 2 (two) times daily.     No current facility-administered medications for this visit.        Review of patient's allergies indicates:   Allergen Reactions    Antihistamines - alkylamine Other (See Comments)     Other reaction(s): Hallucinations    Hydrocodone Other (See Comments)     Confusion, isolated, abandon    Ibuprofen      Gi bleed, ckd    Plavix [clopidogrel] Hives    Prednisone Hallucinations     Other reaction(s): Hallucinations    Tramadol     Triamcinolone Other (See Comments)     hallucinations    Penicillins Rash     Other reaction(s): Unknown       Family History   Problem Relation Age of Onset    Heart disease Mother     Heart disease Father     Cancer Brother         stomach cancer    No Known Problems Paternal Grandmother     No Known Problems Paternal Grandfather     Cancer Brother         duodenal cancer    Alcohol abuse Brother     Parkinsonism Brother     Alcohol abuse Brother     No Known Problems Son        Social History     Socioeconomic History    Marital status:      Spouse name: Not on file    Number of children: Not on file    Years of education: Not on file    Highest education level: Not on file   Occupational History    Not on file   Social Needs    Financial resource strain: Not on file    Food insecurity:     Worry: Not on file     Inability: Not on file    Transportation needs:     Medical: Not on file     Non-medical: Not on file   Tobacco Use    Smoking status: Never Smoker    Smokeless tobacco: Never Used   Substance and Sexual Activity    Alcohol use: No    Drug use: No    Sexual activity: Never   Lifestyle    Physical activity:     Days per week: Not on file     Minutes per session: Not on file    Stress: Not on file   Relationships    Social connections:     Talks on phone: Not on file     Gets together: Not on file     Attends Tenriism service:  Not on file     Active member of club or organization: Not on file     Attends meetings of clubs or organizations: Not on file     Relationship status: Not on file   Other Topics Concern    Not on file   Social History Narrative    Not on file       History of present illness:  Patient returns to clinic today for her right knee her right shoulder.  Patient is well known to the clinic, she has severe rheumatoid arthritis.      Review of Systems:    Constitution: Negative for chills, fever, and sweats.  Negative for unexplained weight loss.    HENT:  Negative for headaches and blurry vision.    Cardiovascular:Negative for chest pain or irregular heart beat. Negative for hypertension.    Respiratory:  Negative for cough and shortness of breath.    Gastrointestinal: Negative for abdominal pain, heartburn, melena, nausea, and vomitting.    Genitourinary:  Negative bladder incontinence and dysuria.    Musculoskeletal:  See HPI for details.     Neurological: Negative for numbness.    Psychiatric/Behavioral: Negative for depression.  The patient is not nervous/anxious.      Endocrine: Negative for polyuria    Hematologic/Lymphatic: Negative for bleeding problem.  Does not bruise/bleed easily.    Skin: Negative for poor would healing and rash    Objective:      Physical Examination:    Vital Signs:    Vitals:    05/21/20 0853   BP: 108/76   Pulse: 78       Body mass index is 19.47 kg/m².    This a well-developed, well nourished patient in no acute distress.  They are alert and oriented and cooperative to examination.        Examination of the right knee, the patient has severely limited range of motion secondary to her rheumatoid arthritis.  Examination of the right shoulder, 1 patient also has severely limited range of motion in the right shoulder secondary to her arthritis.  Deformities of the joints of the fingers, hands and wrist as well are noted.  Consistent with her disease process.  Pertinent New Results:    XRAY  Report / Interpretation:   Single-view of the right shoulder completed today in the office demonstrates glenohumeral arthritis.  Mild acromioclavicular spurring.    Assessment/Plan:      Patient has severe arthritis of multiple joints.  Today we treated her right shoulder in her right knee.  I reviewed images of her right knee which show bone-on-bone arthritis of the right knee.  I injected her right knee today with lidocaine and Depo-Medrol.  Her right shoulder, arthritic but not severely arthritic.  I also injected her right shoulder today with lidocaine and Depo-Medrol.  Will see her back p.r.n..      This note was created using Dragon voice recognition software that occasionally misinterpreted phrases or words.

## 2020-05-21 NOTE — PROCEDURES
Large Joint Aspiration/Injection: R knee  Date/Time: 5/21/2020 8:45 AM  Performed by: SHRUTHI Jones  Authorized by: SHRUTHI Jones     Consent Done?:  Yes (Verbal)  Indications:  Pain  Site marked: the procedure site was marked    Timeout: prior to procedure the correct patient, procedure, and site was verified    Prep: patient was prepped and draped in usual sterile fashion      Local anesthesia used?: Yes    Local anesthetic:  Lidocaine 1% without epinephrine    Details:  Needle Size:  25 G  Ultrasonic Guidance for needle placement?: No    Location:  Knee  Site:  R knee  Medications:  40 mg methylPREDNISolone acetate 40 mg/mL; 40 mg methylPREDNISolone acetate 40 mg/mL  Patient tolerance:  Patient tolerated the procedure well with no immediate complications  Large Joint Aspiration/Injection: R subacromial bursa  Date/Time: 5/21/2020 8:45 AM  Performed by: SHRUTHI Jones  Authorized by: SHRUTHI Jones     Consent Done?:  Yes (Verbal)  Indications:  Pain  Site marked: the procedure site was marked    Timeout: prior to procedure the correct patient, procedure, and site was verified    Prep: patient was prepped and draped in usual sterile fashion      Local anesthesia used?: Yes    Local anesthetic:  Lidocaine 1% without epinephrine    Details:  Needle Size:  25 G  Ultrasonic Guidance for needle placement?: No    Location:  Shoulder  Site:  R subacromial bursa  Medications:  40 mg methylPREDNISolone acetate 40 mg/mL; 40 mg methylPREDNISolone acetate 40 mg/mL  Patient tolerance:  Patient tolerated the procedure well with no immediate complications

## 2020-05-29 NOTE — TELEPHONE ENCOUNTER
The patient's prescription has been approved and sent to   [facility]/pharmacy #[id number] - [place], [place] - [address]  [address] [address]  Phone: [phone] Fax: [phone]

## 2020-05-29 NOTE — TELEPHONE ENCOUNTER
----- Message from Sabrina Phan LPN sent at 5/29/2020  8:38 AM CDT -----      ----- Message -----  From: Radha Osullivan  Sent: 5/29/2020   8:36 AM CDT  To: Gagandeep Castellanos Staff    Pt needs 90 day refill on lisinopril  [facility] mo

## 2020-07-02 NOTE — PROGRESS NOTES
Subjective:        The chief complaint leading to consultation is:   The patient location is:    Visit type: Virtual visit with synchronous audio/video or audio only  This was a phone conversation in lieu of in-person visit due to the coronavirus emergency. Patient acknowledged and agreed to the telephone encounter.     HPI    Past Surgical History:   Procedure Laterality Date    ADENOIDECTOMY      FRACTURE SURGERY      left hip ORIF    HAND SURGERY      HYSTERECTOMY      complete    TONSILLECTOMY       Past Medical History:   Diagnosis Date    Anemia     Cataract     Disorder of kidney and ureter     Hyperlipidemia     Hypertension     Malnutrition     Osteoporosis     PUD (peptic ulcer disease)     Restless leg syndrome     Rheumatoid arthritis     TIA (transient ischemic attack) 1/23/12     Family History   Problem Relation Age of Onset    Heart disease Mother     Heart disease Father     Cancer Brother         stomach cancer    No Known Problems Paternal Grandmother     No Known Problems Paternal Grandfather     Cancer Brother         duodenal cancer    Alcohol abuse Brother     Parkinsonism Brother     Alcohol abuse Brother     No Known Problems Son         Social History:   Marital Status:   Alcohol History:  reports no history of alcohol use.  Tobacco History:  reports that she has never smoked. She has never used smokeless tobacco.  Drug History:  reports no history of drug use.    Review of patient's allergies indicates:   Allergen Reactions    Antihistamines - alkylamine Other (See Comments)     Other reaction(s): Hallucinations    Hydrocodone Other (See Comments)     Confusion, isolated, abandon    Ibuprofen      Gi bleed, ckd    Plavix [clopidogrel] Hives    Prednisone Hallucinations     Other reaction(s): Hallucinations    Tramadol     Triamcinolone Other (See Comments)     hallucinations    Penicillins Rash     Other reaction(s): Unknown       Current  Outpatient Medications   Medication Sig Dispense Refill    aspirin (ECOTRIN) 81 MG EC tablet Take 162 mg by mouth once daily. 2 at night      cyanocobalamin (VITAMIN B-12) 1000 MCG tablet Take 100 mcg by mouth once daily.      docusate sodium (COLACE) 100 MG capsule Take 100 mg by mouth 2 (two) times daily.      fluticasone (FLONASE) 50 mcg/actuation nasal spray 1 spray (50 mcg total) by Each Nare route once daily. 1 Bottle prn    lisinopriL (PRINIVIL,ZESTRIL) 20 MG tablet Take 1 tablet (20 mg total) by mouth once daily. 90 tablet 1    ondansetron (ZOFRAN-ODT) 4 MG TbDL Take 1 tablet (4 mg total) by mouth every 6 (six) hours as needed (nausea). 30 tablet 0    pantoprazole (PROTONIX) 40 MG tablet Take 1 tablet (40 mg total) by mouth once daily. 90 tablet 3    pramipexole (MIRAPEX) 0.125 MG tablet Take 1 tablet (0.125 mg total) by mouth nightly as needed (rls). 90 tablet 3    pravastatin (PRAVACHOL) 10 MG tablet Take 1 tablet (10 mg total) by mouth once daily. 90 tablet 1    triamcinolone acetonide 0.1% (KENALOG) 0.1 % cream Apply topically 2 (two) times daily. 80 g 0    VITAMIN D2 50,000 unit capsule TAKE ONE CAPSULE BY MOUTH ONE TIME PER WEEK 12 capsule 1     No current facility-administered medications for this visit.        Review of Systems      Objective:        Physical Exam:   Physical Exam         Assessment:       No diagnosis found.  Plan:   There are no diagnoses linked to this encounter.  No follow-ups on file.    Total time spent with patient: 0    Each patient to whom he or she provides medical services by telemedicine is:  (1) informed of the relationship between the physician and patient and the respective role of any other health care provider with respect to management of the patient; and (2) notified that he or she may decline to receive medical services by telemedicine and may withdraw from such care at any time.    This note was created using [employer] voice recognition software that  occasionally misinterprets phrases or words.

## 2020-08-03 NOTE — TELEPHONE ENCOUNTER
----- Message from Birgit Alvarenga sent at 8/3/2020  1:20 PM CDT -----  Pts daughter is here. She has a urine sample from the pt with her. She had Urine cup at home and it was sealed. She said its very hard to get her mother here, but she believes she has a UTI. What do you want me to do with the same she has?

## 2020-08-03 NOTE — TELEPHONE ENCOUNTER
----- Message from Jones Reed sent at 8/3/2020  4:02 PM CDT -----  Regarding: Test results  Contact: Caron norwood's daughter  Daughter would like to know the results of the pt's U/a  Please give Caron a call back # [phone]

## 2020-08-05 NOTE — TELEPHONE ENCOUNTER
----- Message from Kesha Pavon sent at 8/5/2020  9:21 AM CDT -----  Katie the patients daughter said she got a message. Can she do a audio visit?  Please call with u/a results that was dropped off yesterday.  Caron # [phone] GH

## 2020-08-05 NOTE — TELEPHONE ENCOUNTER
Spoke with pt - Pt states that pt has malathi down in the last 3 days.Pt can't hold a conversation, can't feed herself and this is all new for pt. Pt daughter wanting to know if it could be due to her having a uti and inquiring about u/a results from Monday.Pt has appt scheduled with Dr. Wells tomorrow but requesting u/a results today.  Please advise. MARIEL

## 2020-08-05 NOTE — TELEPHONE ENCOUNTER
ua shows likely uti. And this can be cause of symptoms. antibiotics called in to pharmacy and culture is pending

## 2020-08-05 NOTE — TELEPHONE ENCOUNTER
----- Message from Kesha Pavon sent at 8/5/2020  9:21 AM CDT -----  Katie the patients daughter said she got a message. Can she do a audio visit?  Please call with u/a results that was dropped off yesterday.  Caron # [phone] GH

## 2020-08-06 NOTE — PROGRESS NOTES
Subjective:        The chief complaint leading to consultation is: UTI  The patient location is:  Home  Visit type: Virtual visit with synchronous audio/video or audio only  This was a phone conversation in lieu of in-person visit due to the coronavirus emergency. Patient acknowledged and agreed to the telephone encounter.     Pt talked to in order to checkup on chronic conditions.  Daughter Cris present.    Pt much better now than she has been since Monday when she started being more lethargic. Daughter thought she had a UTI and brought in a sample.  Tuesday she was hallucinating, could barely swallow. Still lives by herself. Then Wednesday she became more coherent, started antibiotic last night and then she was fine this morning and back to baseline.     Doesn't take BP at home. Denies CP/SOB.    Has a hx of Dr. Hope for RA, no longer taking medicines. Has never taken a biologic  Has pains in the right knee, left shoulder, hands.  Sees Dr. Browning every 3 months for an injections in the right knee.    In [place] all day long, Still itching and now benadryl is used but it doesn't help much, which doesn't seem to make her more sleepy..      Suffers with constipation, takes stool softener, miralax and senna.     Stays at home by herself. Daughter-in-law, david comes over every day.  Uses wheelchair mostly, walks when DIL is in the house, daily, with walker. Able to feed herself. Drinking boost plus twice a day with meals because she doesn't eat them all.    No labs due to difficulty getting her out of the house.        Past Surgical History:   Procedure Laterality Date    ADENOIDECTOMY      FRACTURE SURGERY      left hip ORIF    HAND SURGERY      HYSTERECTOMY      complete    TONSILLECTOMY       Past Medical History:   Diagnosis Date    Anemia     Cataract     Colitis     Disorder of kidney and ureter     Gastric ulcer     Hyperlipidemia     Hypertension     Malnutrition     Osteoporosis     PUD  (peptic ulcer disease)     Restless leg syndrome     Rheumatoid arthritis     TIA (transient ischemic attack) 1/23/12     Family History   Problem Relation Age of Onset    Heart disease Mother     Heart disease Father     Cancer Brother         stomach cancer    No Known Problems Paternal Grandmother     No Known Problems Paternal Grandfather     Cancer Brother         duodenal cancer    Alcohol abuse Brother     Parkinsonism Brother     Alcohol abuse Brother     No Known Problems Son         Social History:   Marital Status:   Alcohol History:  reports no history of alcohol use.  Tobacco History:  reports that she has never smoked. She has never used smokeless tobacco.  Drug History:  reports no history of drug use.    Review of patient's allergies indicates:   Allergen Reactions    Antihistamines - alkylamine Other (See Comments)     Other reaction(s): Hallucinations    Hydrocodone Other (See Comments)     Confusion, isolated, abandon    Ibuprofen      Gi bleed, ckd    Plavix [clopidogrel] Hives    Prednisone Hallucinations     Other reaction(s): Hallucinations    Tramadol     Triamcinolone Other (See Comments)     hallucinations    Penicillins Rash     Other reaction(s): Unknown       Current Outpatient Medications   Medication Sig Dispense Refill    aspirin (ECOTRIN) 81 MG EC tablet Take 162 mg by mouth once daily. 2 at night      cyanocobalamin (VITAMIN B-12) 1000 MCG tablet Take 100 mcg by mouth once daily.      docusate sodium (COLACE) 100 MG capsule Take 100 mg by mouth 2 (two) times daily.      lisinopriL (PRINIVIL,ZESTRIL) 20 MG tablet Take 1 tablet (20 mg total) by mouth once daily. 90 tablet 1    pantoprazole (PROTONIX) 40 MG tablet Take 1 tablet (40 mg total) by mouth once daily. 90 tablet 3    pramipexole (MIRAPEX) 0.125 MG tablet Take 1 tablet (0.125 mg total) by mouth nightly as needed (rls). 90 tablet 3    pravastatin (PRAVACHOL) 10 MG tablet Take 1 tablet (10 mg  total) by mouth once daily. 90 tablet 1    triamcinolone acetonide 0.1% (KENALOG) 0.1 % cream Apply topically 2 (two) times daily. 80 g 0    fluticasone propionate (FLONASE) 50 mcg/actuation nasal spray 1 spray (50 mcg total) by Each Nostril route once daily. 48 g 1    hydrOXYzine HCL (ATARAX) 10 MG Tab Take 1 tablet (10 mg total) by mouth nightly as needed. 90 tablet 1     No current facility-administered medications for this visit.        Review of Systems   Constitutional: Negative for appetite change, fatigue, fever and unexpected weight change.   HENT: Positive for postnasal drip.    Eyes: Positive for visual disturbance (double vision that comes and goes.).   Respiratory: Negative for cough, chest tightness, shortness of breath and wheezing.    Cardiovascular: Negative for chest pain and leg swelling.   Gastrointestinal: Negative for abdominal pain, constipation, nausea and vomiting.        -heartburn   Genitourinary: Negative for difficulty urinating, dysuria, frequency and urgency.   Musculoskeletal: Positive for arthralgias. Negative for back pain, myalgias and neck pain.   Skin: Negative for rash.        Itching on back   Neurological: Negative for dizziness, weakness, numbness and headaches.   Hematological: Does not bruise/bleed easily.   Psychiatric/Behavioral: Negative for dysphoric mood, sleep disturbance and suicidal ideas. The patient is not nervous/anxious.    All other systems reviewed and are negative.        Objective:        Physical Exam:   Physical Exam         Assessment:       1. Acute cystitis without hematuria    2. Acute seasonal allergic rhinitis due to pollen    3. Mixed hyperlipidemia, baseline LDL not available    4. Itching    5. Walker as ambulation aid      Plan:   Acute cystitis without hematuria  Comments:  Resolving. To finish anbx. To contact office if symptoms worsen or do not continue to improve.    Acute seasonal allergic rhinitis due to pollen  Comments:  Symptomatic.  To resume flonaase. Will continue to monitor.  Orders:  -     fluticasone propionate (FLONASE) 50 mcg/actuation nasal spray; 1 spray (50 mcg total) by Each Nostril route once daily.  Dispense: 48 g; Refill: 1    Mixed hyperlipidemia, baseline LDL not available  Comments:  Active. To continue pravastatin, refilled.  Orders:  -     pravastatin (PRAVACHOL) 10 MG tablet; Take 1 tablet (10 mg total) by mouth once daily.  Dispense: 90 tablet; Refill: 1    Itching  Comments:  Active. To take atarax at nigtht and benadryl in the morning to control symptoms. Will reassess symptoms next visit.  Orders:  -     hydrOXYzine HCL (ATARAX) 10 MG Tab; Take 1 tablet (10 mg total) by mouth nightly as needed.  Dispense: 90 tablet; Refill: 1    Walker as ambulation aid  Comments:  Pt encouraged to use walker to prenent falls, injury      Follow up in about 3 months (around 11/6/2020) for Allergies, LABS.    Total time spent with patient: 15 min    Each patient to whom he or she provides medical services by telemedicine is:  (1) informed of the relationship between the physician and patient and the respective role of any other health care provider with respect to management of the patient; and (2) notified that he or she may decline to receive medical services by telemedicine and may withdraw from such care at any time.    This note was created using [employer] voice recognition software that occasionally misinterprets phrases or words.

## 2020-10-21 PROBLEM — I63.9 STROKE: Status: ACTIVE | Noted: 2020-01-01

## 2020-10-21 PROBLEM — N17.9 AKI (ACUTE KIDNEY INJURY): Status: ACTIVE | Noted: 2020-01-01

## 2020-10-21 NOTE — TELEPHONE ENCOUNTER
----- Message from Kesha Pavon sent at 10/21/2020  1:50 PM CDT -----  VM 1:49  Please call the patients daughter in law. She has some questions. Caron #   [phone] GH

## 2020-10-21 NOTE — ED PROVIDER NOTES
Encounter Date: 10/21/2020    SCRIBE #1 NOTE: Courtney RODRIGUEZ, yarelis scribing for, and in the presence of, Dr. Mooney.       History     Chief Complaint   Patient presents with    Extremity Weakness     left arm weakness.  right side gaze.     10/21/2020  6:59 PM     The patient is a 89 y.o. female  who presents with altered mental status. The patient was last seen normal this afternoon at 1 PM by family after a lift assist. Two hours ago, patient was found lethargic and vomiting. Pt is unable to move the left arm per EMS. Patient has a glucose >200. She had a fall last night. Patient's history is limited secondary to acute condition and is provided by EMS. The patient reports she developed acute weakness to the left arm and left leg yesterday afternoon. Patient is DNR. PMHx of TIA, HTN, HLD, anemia, osteoporosis, PUD, RA, RLS, colitis and gastric ulcer. SHx of hysterectomy, hand surgery and fracture surgery.    The history is provided by the EMS personnel.     Review of patient's allergies indicates:   Allergen Reactions    Antihistamines - alkylamine Other (See Comments)     Other reaction(s): Hallucinations    Hydrocodone Other (See Comments)     Confusion, isolated, abandon    Ibuprofen      Gi bleed, ckd    Plavix [clopidogrel] Hives    Prednisone Hallucinations     Other reaction(s): Hallucinations    Tramadol     Triamcinolone Other (See Comments)     hallucinations    Penicillins Rash     Other reaction(s): Unknown     Past Medical History:   Diagnosis Date    Anemia     Cataract     Colitis     Disorder of kidney and ureter     Gastric ulcer     Hyperlipidemia     Hypertension     Malnutrition     Osteoporosis     PUD (peptic ulcer disease)     Restless leg syndrome     Rheumatoid arthritis     Seizure disorder, status epilepticus, nonconvulsive     STEMI (ST elevation myocardial infarction) 10/23/2020    Stroke 10/21/2020    TIA (transient ischemic attack) 1/23/12     Past Surgical  History:   Procedure Laterality Date    ADENOIDECTOMY      FRACTURE SURGERY      left hip ORIF    HAND SURGERY      HYSTERECTOMY      complete    TONSILLECTOMY       Family History   Problem Relation Age of Onset    Heart disease Mother     Heart disease Father     Cancer Brother         stomach cancer    No Known Problems Paternal Grandmother     No Known Problems Paternal Grandfather     Cancer Brother         duodenal cancer    Alcohol abuse Brother     Parkinsonism Brother     Alcohol abuse Brother     No Known Problems Son      Social History     Tobacco Use    Smoking status: Never Smoker    Smokeless tobacco: Never Used   Substance Use Topics    Alcohol use: No    Drug use: No     Review of Systems   Unable to perform ROS: Acuity of condition (stroke)       Physical Exam     Initial Vitals   BP Pulse Resp Temp SpO2   10/21/20 1906 10/21/20 1906 10/21/20 1906 10/21/20 1906 10/21/20 1858   (!) 112/53 91 20 97.2 °F (36.2 °C) (!) 86 %      MAP       --                Physical Exam    Nursing note and vitals reviewed.  Constitutional: No distress.   HENT:   Head: Normocephalic and atraumatic.   Mouth/Throat: Mucous membranes are normal.   Eyes: Pupils are equal, round, and reactive to light.   Subtle right side gaze preference but patient is able to look to the left. No deviation of eye. Right external exophthalmos.    Neck: Normal range of motion.   Cardiovascular: Normal rate, regular rhythm, normal heart sounds and intact distal pulses. Exam reveals no gallop and no friction rub.    No murmur heard.  Pulmonary/Chest: Breath sounds normal. She has no wheezes. She has no rhonchi. She has no rales.   Abdominal: Soft. She exhibits no distension. There is no abdominal tenderness.   Old scar in the left lower abdomen.   Musculoskeletal: Normal range of motion. No edema.      Comments: Contractures to the bilateral hands and toes. No bony TTP to the left arm or leg.   Neurological: She is alert.    Delayed slowed speech. Weakness in the left arm and left leg. Numbness in the let arm.   Skin: Skin is dry. No rash noted. No erythema.         ED Course   Critical Care  Performed by: Sameer Mooney MD  Authorized by: Sameer Mooney MD   Direct patient critical care time: 30 minutes  Additional history critical care time: 5 minutes  Ordering / reviewing critical care time: 5 minutes  Documentation critical care time: 5 minutes  Consulting other physicians critical care time: 5 minutes  Consult with family critical care time: 5 minutes  Total critical care time (exclusive of procedural time) : 55 minutes  Critical care was necessary to treat or prevent imminent or life-threatening deterioration of the following conditions: sepsis and CNS failure or compromise.  Critical care was time spent personally by me on the following activities: discussions with consultants, evaluation of patient's response to treatment, obtaining history from patient or surrogate, ordering and review of laboratory studies, pulse oximetry, review of old charts, re-evaluation of patient's condition, ordering and review of radiographic studies, ordering and performing treatments and interventions and examination of patient.        Labs Reviewed   CBC W/ AUTO DIFFERENTIAL - Abnormal; Notable for the following components:       Result Value    WBC 19.19 (*)     Immature Granulocytes 0.6 (*)     Gran # (ANC) 14.6 (*)     Immature Grans (Abs) 0.11 (*)     Mono # 1.7 (*)     Gran% 76.0 (*)     Lymph% 13.5 (*)     All other components within normal limits   COMPREHENSIVE METABOLIC PANEL - Abnormal; Notable for the following components:    Potassium 5.7 (*)     CO2 16 (*)     Glucose 196 (*)     BUN, Bld 53 (*)     Creatinine 2.3 (*)     Albumin 3.1 (*)      (*)     ALT 74 (*)     Anion Gap 18 (*)     eGFR if  21 (*)     eGFR if non  18 (*)     All other components within normal limits   TSH - Abnormal;  Notable for the following components:    TSH 5.429 (*)     All other components within normal limits   URINALYSIS - Abnormal; Notable for the following components:    Protein, UA Trace (*)     Occult Blood UA 1+ (*)     Leukocytes, UA Trace (*)     All other components within normal limits   POCT GLUCOSE, HAND-HELD DEVICE - Abnormal; Notable for the following components:    POC Glucose 173 (*)     All other components within normal limits   POCT GLUCOSE - Abnormal; Notable for the following components:    POCT Glucose 173 (*)     All other components within normal limits   PROTIME-INR   LIPID PANEL   T4, FREE   SARS-COV-2 RNA AMPLIFICATION, QUAL   URINALYSIS MICROSCOPIC     EKG Readings: (Independently Interpreted)   Normal sinus rhythm with a rate of 93. Anterior fascicular block. Normal intervals. No ST segment elevation or depression.     ECG Results          ECG 12 lead (Final result)  Result time 10/22/20 20:01:38    Final result by Interface, Lab In [facility] (10/22/20 20:01:38)                 Narrative:    Test Reason : I63.9,    Vent. Rate : 093 BPM     Atrial Rate : 093 BPM     P-R Int : 202 ms          QRS Dur : 076 ms      QT Int : 372 ms       P-R-T Axes : 055 -71 057 degrees     QTc Int : 462 ms    Normal sinus rhythm  Left anterior fascicular block  Possible Lateral infarct ,age undetermined  Abnormal ECG  When compared with ECG of 28-APR-2017 13:57,  Borderline criteria for Lateral infarct are now Present  Nonspecific T wave abnormality no longer evident in Inferior leads  Confirmed by Elvin Foreman MD ([id number]) on 10/22/2020 8:01:17 PM    Referred By: [email or web address]   SELF           Confirmed By:Elvin Foreman MD                             EKG 12-LEAD (Final result)  Result time 10/22/20 16:28:01    Final result by Unknown User (10/22/20 16:28:01)                                Imaging Results          CT Knee Without Contrast Right (Final result)  Result time 10/21/20 23:29:16    Final result by Jian  JOSE Raphael MD (10/21/20 23:29:16)                 Impression:      1. Severely comminuted, impacted, angulated and displaced fracture of the distal femur, as described.  CT imaging better demonstrates extension of the fracture to the intercondylar notch.  2. No additional acute fractures identified.  3. Additional details, as per report body.      Electronically signed by: Jian Raphael  Date:    10/21/2020  Time:    23:29             Narrative:    EXAMINATION:  CT KNEE WITHOUT CONTRAST RIGHT    CLINICAL HISTORY:  distal femur fx;    TECHNIQUE:  Unenhanced CT imaging of the knee was performed with multiplanar reformats and bone and standard algorithm.    COMPARISON:  Radiographs of the right knee and femur performed 10/21/2020    FINDINGS:  Again seen is severely comminuted, impacted, and angulated fracture distal femoral diaphysis and metaphysis.  There is approximately 46 degrees of anterior angulation of the distal fracture fragment.  There is approximately 1.1 cm lateral and 1 cm anterior displacement of distal fracture fragment.  At least 1.2 cm of override of the impacted proximal distal fracture fragments.    Current CT examination better demonstrates fracture extension to the intercondylar notch.  No definite extension to the tibiofemoral joint space.  No definite acute fractures of the visualized proximal tibia or fibula.  No fracture of the patella identified.    Severe osteoarthritic changes of the medial and lateral joint space are noted.  Moderate joint space narrowing and osteophyte formation of the patellofemoral compartment.    Posttraumatic lipohemarthrosis is noted.  Posttraumatic findings are noted in the soft tissues about the knee joint and proximal tibia.                                X-Ray Femur Ap/Lat Right (Final result)  Result time 10/21/20 22:18:28    Final result by Jian Raphael MD (10/21/20 22:18:28)                 Impression:      Acute comminuted and displaced fracture of the  distal femoral metaphysis without definite intra-articular extension, as described.    This report was flagged in Epic as abnormal.      Electronically signed by: Jian Raphael  Date:    10/21/2020  Time:    22:18             Narrative:    EXAMINATION:  XR KNEE 3 VIEW RIGHT; XR FEMUR 2 VIEW RIGHT    CLINICAL HISTORY:  fall; Unspecified fall, initial encounter    TECHNIQUE:  AP, lateral, and Merchant views of the right knee were performed.    COMPARISON:  Right knee radiographs performed 10/24/2017 and 05/24/2018    FINDINGS:  Acute comminuted and displaced fracture of the distal femoral metaphysis.  There is approximately 1.3 cm of lateral displacement of the proximal fracture fragment.  There is approximately 27 degrees of anterior angulation of the proximal fracture fragment.  No definite extension to the articular surface.    No definite additional acute fractures are seen.  Osseous demineralization is identified.  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  Vascular calcifications.  The joint effusion is identified.  Soft tissue swelling about the knee joint.                                X-Ray Knee 3 View Right (Final result)  Result time 10/21/20 22:18:28   Procedure changed from X-Ray Knee 1 or 2 View Right     Final result by Jian Raphael MD (10/21/20 22:18:28)                 Impression:      Acute comminuted and displaced fracture of the distal femoral metaphysis without definite intra-articular extension, as described.    This report was flagged in Epic as abnormal.      Electronically signed by: Jian Raphael  Date:    10/21/2020  Time:    22:18             Narrative:    EXAMINATION:  XR KNEE 3 VIEW RIGHT; XR FEMUR 2 VIEW RIGHT    CLINICAL HISTORY:  fall; Unspecified fall, initial encounter    TECHNIQUE:  AP, lateral, and Merchant views of the right knee were performed.    COMPARISON:  Right knee radiographs performed 10/24/2017 and 05/24/2018    FINDINGS:  Acute comminuted and displaced  fracture of the distal femoral metaphysis.  There is approximately 1.3 cm of lateral displacement of the proximal fracture fragment.  There is approximately 27 degrees of anterior angulation of the proximal fracture fragment.  No definite extension to the articular surface.    No definite additional acute fractures are seen.  Osseous demineralization is identified.  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  Vascular calcifications.  The joint effusion is identified.  Soft tissue swelling about the knee joint.                               CT Chest Abdomen Pelvis Without Contrast (XPD) (Final result)  Result time 10/21/20 21:45:57    Final result by Jian Raphael MD (10/21/20 21:45:57)                 Impression:      1. No acute traumatic findings in the chest, abdomen, or pelvis.  2. Prominent stool within distended distal colon and rectum suggestive of fecal impaction.  Minimal surrounding inflammatory change raise the possibility of stercoral colitis/proctitis.  3. Indeterminate left renal lesions, as noted on prior CT imaging of 06/08/2018, for which dedicated renal ultrasound would be recommended, this is not been previously performed.  4. Additional incidental and chronic findings, as per report body.      Electronically signed by: Jian Raphael  Date:    10/21/2020  Time:    21:45             Narrative:    EXAMINATION:  CT CHEST ABDOMEN PELVIS WITHOUT CONTRAST(XPD)    CLINICAL HISTORY:  Chest-abdomen-pelvis trauma, blunt;fall,;    TECHNIQUE:  Low dose axial images, sagittal and coronal reformations were obtained from the thoracic inlet to the pubic symphysis    COMPARISON:  CT abdomen and pelvis performed 06/18/2018.  CT chest performed 03/02/2017.    FINDINGS:  This examination is limited due to lack of intravenous contrast.    CHEST    Support tubes and lines: None.    Aorta: Not dilated.  Atherosclerotic calcification.    Heart: Mildly enlarged.    Coronary arteries: Coronary artery and  valvular calcifications are noted.    Pericardium: Normal. No effusion, thickening, or calcification.    Central pulmonary arteries: Normal caliber.    Base of neck/thyroid: Unremarkable.    Lymph nodes: No supraclavicular, axillary, internal mammary, mediastinal, or hilar adenopathy.    Esophagus: Patulous and fluid-filled esophagus.    Pleura: Calcified pleural plaque is noted at the left lung apex.  No pneumothorax or pleural effusion.    Body wall: Unremarkable.    Airways: Unremarkable.    Lungs: Evaluation of the lung parenchyma is somewhat limited due to motion.  Additionally, comparison to prior dedicated chest CT of 03/02/2017 is limited due to differences in technique/slice thickness.  Again noted are peripheral and lower lobe predominant reticulation and bronchial wall thickening.  No evidence of honeycombing is noted.  Some degree of traction bronchiectasis is suggested at the lung apices associated with pleuroparenchymal scarring.  Elsewhere, there are areas of nonspecific mosaic attenuation, which may be on the basis of small vessel and/or small airways disease.  Overall pattern of findings is felt relatively similar to the prior study of 03/02/2017.    Bones: Osseous mineralization.  Multilevel mild height loss of several thoracic vertebral bodies without definite change since 03/02/2017 CT.    ABDOMEN/PELVIS    Liver: Normal contour.    Gallbladder and bile ducts: Unremarkable.    Pancreas: Normal contour.    Spleen: Normal contour.    Adrenals: Normal contour.    Kidneys: 2 hyperattenuating foci are noted in the right kidney, the larger of which is located in the midpole and measures approximately 16 mm (series 2, image 131 in the smaller which is located in the inferior pole measuring approximately 9 mm.  Simple cysts is noted in the superior to mid pole.  No concerning lesions are identified in the right kidney.    Lymph nodes: No abdominal or pelvic lymphadenopathy.    Bowel and mesentery: Small  hiatal hernia.  Pronounced amount of stool is noted within the rectum and distal colon.  No definite wall thickening of the involved bowel.  There may be minimal surrounding inflammatory change in the surrounding fat visualized small bowel colon are otherwise unremarkable.    Abdominal aorta: Heavy atherosclerotic calcification of the aorta and iliac arteries.    Inferior vena cava: Unremarkable.    Free fluid or free air: None.    Pelvis: The uterus appears to be surgically absent.    Urinary bladder: Unremarkable.    Body wall: Unremarkable.    Bones: Chronic deformity of the left proximal femur with suggestion of prior internal fixation.  No metallic hardware is identified on the current study.  Additionally, there is evidence of a chronic right superior and inferior pubic ramus fracture.  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  No definite acute thoracic or lumbar spine fracture is appreciated.                               CT Cervical Spine Without Contrast (Final result)  Result time 10/21/20 21:25:51    Final result by Jian Raphael MD (10/21/20 21:25:51)                 Impression:      1. No acute cervical spine fracture.  2. Degenerative findings in the cervical spine without critical spinal canal or foraminal narrowing      Electronically signed by: Jian Raphael  Date:    10/21/2020  Time:    21:25             Narrative:    EXAMINATION:  CT CERVICAL SPINE WITHOUT CONTRAST    CLINICAL HISTORY:  Neck trauma (Age => 65y);    TECHNIQUE:  Low dose axial images, sagittal and coronal reformations were performed though the cervical spine.  Contrast was not administered.    COMPARISON:  None    FINDINGS:  Fractures: No acute fractures    Alignment: No definite acute traumatic subluxation.  There is grade 1 anterolisthesis of C4 on C5 and C5 on C6.  Atlanto-axial and atlanto-occipital joints: Atlanto-axial and atlanto-occipital intervals are not widened.  Facet joints: There is no traumatic  facet joint widening.  Degenerative facet arthropathy.  Vertebral bodies: Osseous demineralization.  Degenerative plate change most advanced spanning the C5-6 through C7-T1 levels.  Discs: Degenerate disc osteophyte complexes.  Spinal canal and foraminal narrowing: Although CT does not optimally evaluate the soft tissue contents of the spinal canal and foramina, no critical stenosis is suggested.  Paraspinal soft tissues: Soft tissue atherosclerotic vascular calcifications are noted.    Upper Lungs:Nonspecific debris is noted within a patulous esophagus.  Fibrotic changes are noted in the lung apices.  There is bronchiectasis and suggestion of calcified pleural plaque on the left.                               CT Head Without Contrast (Final result)  Result time 10/21/20 19:13:37    Final result by Anuj Bob Jr., MD (10/21/20 19:13:37)                 Impression:      Moderate brain atrophy with deep white matter ischemic changes.  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  Acute CVA or hemorrhage is not identified on this study.      Electronically signed by: Anuj Bob MD  Date:    10/21/2020  Time:    19:13             Narrative:    EXAMINATION:  CT HEAD WITHOUT CONTRAST    CLINICAL HISTORY:  Neuro deficit, acute, stroke suspected;    TECHNIQUE:  Low dose axial images were obtained through the head.  Coronal and sagittal reformations were also performed. Contrast was not administered.    COMPARISON:  MRI brain of February 23, 2015.    FINDINGS:  No cranial fracture is identified.  Scalp edema or hematoma is not noted.    The basal cisterns are clear.  There is no mass effect or midline shift.  There is moderate enlargement of the lateral ventricles and sylvian fissures consistent with brain atrophy.  Hypodensity in the central white matter of the cerebrum bilaterally is consistent with deep white matter ischemic changes.  There is a 7 mm near CSF focal density of the right internal capsule  consistent with an old lacunar infarct.    Focal area of increased or decreased CT density consistent with tumor, edema, acute CVA or hemorrhage is not seen.  No intracranial hemorrhage or hematoma is noted.                                 Medical Decision Making:   History:   Old Medical Records: I decided to obtain old medical records.  Independently Interpreted Test(s):   I have ordered and independently interpreted EKG Reading(s) - see prior notes  Clinical Tests:   Lab Tests: Reviewed and Ordered  Radiological Study: Reviewed and Ordered  Medical Tests: Reviewed and Ordered  ED Management:  Code Stroke : 6:58 PM     Last known normal: 1:00 PM, onset of yesterday afternoon per patient.  Provider contact time: 6:55 PM   Head CT Read by MD: negative CT of head. No acute bleed. 7:13 PM per my read.  Neurology consult ordered by ED MD: 7:40 PM.    VAN positive? YES left side. (weakness plus any other cortical finding. If so, call stroke alert out to 24 hours):    Weakness: YES left side    Visual changes: NO   Aphasia: NO  Neglect: NO    NIH score: 6  TPa administered: NO, onset of symptoms yesterday afternoon per patient.  Unclear of the etiology of the patient's symptoms.  She will need further workup and admission.  She has weakness and falls.  She has leukocytosis with hyperkalemia and acute renal insufficiency.  Awaiting urinalysis at the time of admission.  Do not think she is a tPA candidate given onset of symptoms yesterday afternoon.  It is unclear if she has had a stroke and she will need MRI for further evaluation.  Unclear if this is sepsis at this time but still awaiting UA.  Care will be transferred to Dr. Peterson.  CT imaging pending .             Scribe Attestation:   Scribe #1: I performed the above scribed service and the documentation accurately describes the services I performed. I attest to the accuracy of the note.    I, Dr. Sameer Mooney personally performed the services described in this  documentation. All medical record entries made by the scribe were at my direction and in my presence.  I have reviewed the chart and agree that the record reflects my personal performance and is accurate and complete. Sameer Mooney MD.  7:34 AM 10/27/2020    DISCLAIMER: This note was prepared with Dragon NaturallySpeaking voice recognition transcription software. Garbled syntax, mangled pronouns, and other bizarre constructions may be attributed to that software system         ED Course as of Oct 27 0734   Wed Oct 21, 2020   1959 Patient is complaining of back pain.  She has acute renal insufficiency with elevated liver function test.  She fell yesterday.  She has a white count of 20.  Should be given IV fluids.  Awaiting urinalysis look for urinary tract infection.  Could be sepsis.  She is a DNR.    [JS]      ED Course User Index  [JS] Sameer Mooney MD            Clinical Impression:     ICD-10-CM ICD-9-CM   1. Stroke  I63.9 434.91   2. Fall  W19.XXXA E888.9   3.      4.      5.                             ED Disposition Condition    Admit                             Sameer Mooney MD  10/27/20 0739       Sameer Mooney MD  12/08/20 1046

## 2020-10-22 PROBLEM — N17.9 ACUTE KIDNEY INJURY SUPERIMPOSED ON CHRONIC KIDNEY DISEASE: Status: ACTIVE | Noted: 2020-01-01

## 2020-10-22 PROBLEM — S72.8X1A OTHER FRACTURE OF RIGHT FEMUR, INITIAL ENCOUNTER FOR CLOSED FRACTURE: Status: ACTIVE | Noted: 2020-01-01

## 2020-10-22 PROBLEM — E44.0 MODERATE MALNUTRITION: Status: ACTIVE | Noted: 2020-01-01

## 2020-10-22 PROBLEM — Z71.89 GOALS OF CARE, COUNSELING/DISCUSSION: Status: ACTIVE | Noted: 2020-01-01

## 2020-10-22 PROBLEM — N18.9 ACUTE KIDNEY INJURY SUPERIMPOSED ON CHRONIC KIDNEY DISEASE: Status: ACTIVE | Noted: 2020-01-01

## 2020-10-22 PROBLEM — S72.90XA FRACTURE OF FEMUR: Status: ACTIVE | Noted: 2020-01-01

## 2020-10-22 NOTE — ED TRIAGE NOTES
Per EMS, called to residence this AM for fall assist.  At that time, left arm was weak.  Now patient is altered.  Left sided weakness in arm and leg. Right gaze.

## 2020-10-22 NOTE — NURSING
Results for MEGAN RAJPUT (MRN [id number]) as of 10/22/2020 17:20   Ref. Range 10/22/2020 15:55   Potassium Latest Ref Range: 3.5 - 5.1 mmol/L 5.6 (H)     Attempted to notify Dr. Rm via phone with no answer. Will try again    1738- Dr. Rm notified. New orders received. Will monitor.

## 2020-10-22 NOTE — PT/OT/SLP EVAL
"Occupational Therapy   Evaluation    Name: Melody Dupont  MRN: [id number]  Admitting Diagnosis:  Stroke      Recommendations:     Discharge Recommendations: nursing facility, skilled  Discharge Equipment Recommendations:  (TBD)  Barriers to discharge:  Decreased caregiver support    Assessment:     Melody Dupont is a 89 y.o. female with a medical diagnosis of stroke and a subsequent fall.    MRI BRAIN WITHOUT CONTRAST shows a moderate volume loss and nonspecific white matter change with a chronic lacunar infarction in the right basal ganglia.  Additionally, there are 2 small acute nonhemorrhagic infarctions in the superior and posterior lateral right temporal lobe.     CT KNEE WITHOUT CONTRAST RIGHT shows:   Severely comminuted, impacted, angulated and displaced fracture of the distal femur, as described.  CT imaging better demonstrates extension of the fracture to the intercondylar notch.     She presents with a decline in functional status due to the listed impairments, impacting ADLs and functional mobility.Pt found supine in bed, very lethargic and intermittently responsive to verbal and tactile stimuli (cold washcloth placed on her forehead). R gaze preference with L neglect noted. Pt was able to state her first name, stated, "bad" when asked how she felt, correctly stated, "three" when asked how many fingers OT was displaying and "blue" when asked what color OT's glove was. Pt did not respond to any motor commands. HOB raised to 45* with pt tolerating it well.    Caron, her daughter-in-law and caregiver reported that pt slept a lot, sat in her lift chair all day watching TV and needed total assist for all bathing, dressing and toileting as well as tranfers. Caron stated that pt has been unable for several months to fully stand due to her severe rheumatoid arthritis. She was able to grasp and sip through a straw from a cup with a handle, could eat finger foods and could perform simple grooming tasks " "with set-up. Recommend OT treatment to maximize endurance, safety & independence with ADL's.    PLOF information obtained from speaking to pt's caregiver, Caron. . Performance deficits affecting function: weakness, decreased upper extremity function, gait instability, decreased ROM, impaired endurance, impaired balance, decreased lower extremity function, impaired coordination, impaired joint extensibility, visual deficits, decreased safety awareness, impaired fine motor, impaired self care skills, impaired cognition, pain, impaired functional mobilty, orthopedic precautions, impaired sensation.    Pt will benefit from further inpatient therapy to maximize return to prior level of function, due to pt currently needing increased assistance for ADLs and functional mobility.      Rehab Prognosis: Fair; patient would benefit from acute skilled OT services to address these deficits and reach maximum level of function.       Plan:     Patient to be seen 3 x/week to address the above listed problems via self-care/home management, therapeutic activities, therapeutic exercises, neuromuscular re-education  · Plan of Care Expires: 11/11/20  · Plan of Care Reviewed with: patient, other (see comments)(daughter-in-law, Caron)    Subjective     Chief Complaint: "Ouch"  Patient/Family Comments/goals: Unable to state    Occupational Profile:  Living Environment: Pt lived alone and her caregiver came 3x/day to assist pt with ADL's and transfers.  Previous level of function: Total assist for squat pivot transfers, all bathing, dressing and toileting and limited assistance for self feeding and grooming/oral hygiene tasks at wheelchair level.  Roles and Routines: Pt slept a lot and enjoyed watching game shows, [employer] and the [employer].  Equipment Used at Home:  bedside commode, rollator, wheelchair(lift chair)  Assistance upon Discharge: Patient will need 24 hour assistance and supervision for safety.    Pain/Comfort:  · Pain " "Rating 1: (unable to rate, "yes")  · Location - Side 1: Right  · Location 1: leg  · Pain Rating 2: (not rated; stated "ouch" with L UE PROM)  · Location - Side 2: Left  · Location 2: arm  · Pain Addressed 2: Cessation of Activity, Reposition    Patients cultural, spiritual, [employer]alism conflicts given the current situation:      Objective:     Communicated with: Adelaida nurse prior to session.  Patient found supine with bed alarm, peripheral IV, telemetry upon OT entry to room.    General Precautions: Standard, aphasia, aspiration, NPO, fall   Orthopedic Precautions:RLE non weight bearing   Braces: (posterior right leg splint)     Occupational Performance:    Bed Mobility:      Activities of Daily Living:  · Dependent for all self care tasks    Cognitive/Visual Perceptual:  Cognitive/Psychosocial Skills:     -       Oriented to: Person   -       Follows Commands/attention:Inattentive  -       Communication: non-verbal other than a few one word responses to questions with low volume  -       Safety awareness/insight to disability: impaired   -       Mood/Affect/Coping skills/emotional control: Lethargic  Visual/Perceptual:      -Intact  acuity   -Impaired  visual field (Left neglect/right gaze preference)    Physical Exam:  Postural examination/scapula alignment:    -       Rounded shoulders  -       Forward head  Skin integrity: Visible skin intact  Edema:  None noted  Dominant hand:    -       right  Upper Extremity Range of Motion:     -       Right Upper Extremity: grossly impaired due to severe arthritic deformities, especially her hands  -       Left Upper Extremity: no active movement observed with severe arthritic deformities noted, especially her hands  Upper Extremity Strength:  Unable to assess due to pt not following motor commands   Strength:  Impaired  Fine Motor Coordination:    -       Impaired  Left hand, finger to nose  , Right hand, finger to nose  , Left hand thumb/finger opposition skills  , " Right hand thumb/finger opposition skills  , Left hand, manipulation of objects  , Right hand, manipulation of objects  , Left hand, graphomotor skills   and Right hand, graphomotor skills    Gross motor coordination:   hemiplegia/paresis    AMPAC 6 Click ADL:  AMPAC Total Score: 6    Treatment & Education:  OT oriented pt to time, place & situation. Multimodal stimuli presented to pt from her left side to increase awareness of L side.  HOB raised to 45* to increase endurance with pt tolerating well.  OT ed pt's caregiver/dtr-in-law on OT role & POC as well as discharge recommendations.        Education:    Patient left HOB elevated with MRI technicians present to take pt for testing    GOALS:   Multidisciplinary Problems     Occupational Therapy Goals        Problem: Occupational Therapy Goal    Goal Priority Disciplines Outcome Interventions   Occupational Therapy Goal     OT, PT/OT Ongoing, Progressing    Description: Goals to be met by: 11/11/2020     Patient will increase functional independence with ADLs by performing:    Feeding with Set-up Assistance, Maximum Assistance, and Assistive Devices as needed.  Grooming while seated with Set-up Assistance, Maximum Assistance, and Assistive Devices as needed.  Upper extremity exercise program x 5 reps with assistance as needed.  Follow 80% of 1-step commands.                     History:     Past Medical History:   Diagnosis Date    Anemia     Cataract     Colitis     Disorder of kidney and ureter     Gastric ulcer     Hyperlipidemia     Hypertension     Malnutrition     Osteoporosis     PUD (peptic ulcer disease)     Restless leg syndrome     Rheumatoid arthritis     Stroke 10/21/2020    TIA (transient ischemic attack) 1/23/12       Past Surgical History:   Procedure Laterality Date    ADENOIDECTOMY      FRACTURE SURGERY      left hip ORIF    HAND SURGERY      HYSTERECTOMY      complete    TONSILLECTOMY         Time Tracking:     OT Date of  Treatment: 10/22/20  OT Start Time: 0959  OT Stop Time: 1013  OT Total Time (min): 14 min    Billable Minutes:Evaluation 14    NED Ardon  10/22/2020

## 2020-10-22 NOTE — CONSULTS
[facility]  Neurology  Consult Note    Patient Name: Melody Dupont  MRN: [id number]  Admission Date: 10/21/2020  Hospital Length of Stay: 1 days  Code Status: DNR   Attending Provider: Natalia Rm MD   Consulting Provider: Gabby Thompson NP  Primary Care Physician: Larisa Wells MD  Principal Problem:Stroke    Consults  Subjective:     Chief Complaint   Extremity Weakness       left arm weakness.  right side gaze.         HPI: Melody Dupont is a 89 y/o female with a past medical history significant for HTN, HLD, TIA, anemia, RA, RLS, and GERD who presented to the ED with c/o altered mental status.  Pt is accompanied by her daughter in law who provides the history.  Per pt's daughter in law, pt lives alone.  Her daughter in law visits her 3 times daily to assist her with ADL's.  Pt was noted to be in her normal state of health this afternoon around 1 pm.  This evening when pt's daughter in law went back to check on her and help her get ready for bed, she found the patient to be lethargic and vomiting.  Pt was unable to move her left side.  She further relays that pt had an unwitnessed fall yesterday and her right knee is much more swollen than normal.   Pt was assessed for acute stroke while in the ED and determined not to be a candidate for tPA.  Labs are significant for transaminitis, hyperkalemia of 5.7 and JAS with creatinine of 2.3.  CT head was negative for anything acute.  CT C-spine was negative for fracture.  CT chest/abd/pelvis was significant for fecal impaction.  An xray of the patient's right knee was obtained and she is found to have an acute distal femur fracture.  The case was discussed with Dr. Curtis, orthopedic surgeon, and further recommendations were noted.  The patient will be admitted to the service of hospital medicine for continued treatment.       Neurological Consult:  Patient seen and examined with Dr. Junior Parr.  Patient of the eyes closed.  She has  limited cooperation with exam.  Patient back from her MRI which showed chronic lacunar infarct in the right basal ganglia and 2 small acute nonhemorrhagic infarctions superior and posterior lateral right temporal lobe.  Patient is on aspirin and pravastatin at home.  She has a history of a TIA.  Past Medical History:   Diagnosis Date    Anemia     Cataract     Colitis     Disorder of kidney and ureter     Gastric ulcer     Hyperlipidemia     Hypertension     Malnutrition     Osteoporosis     PUD (peptic ulcer disease)     Restless leg syndrome     Rheumatoid arthritis     Stroke 10/21/2020    TIA (transient ischemic attack) 1/23/12       Past Surgical History:   Procedure Laterality Date    ADENOIDECTOMY      FRACTURE SURGERY      left hip ORIF    HAND SURGERY      HYSTERECTOMY      complete    TONSILLECTOMY         Review of patient's allergies indicates:   Allergen Reactions    Antihistamines - alkylamine Other (See Comments)     Other reaction(s): Hallucinations    Hydrocodone Other (See Comments)     Confusion, isolated, abandon    Ibuprofen      Gi bleed, ckd    Plavix [clopidogrel] Hives    Prednisone Hallucinations     Other reaction(s): Hallucinations    Tramadol     Triamcinolone Other (See Comments)     hallucinations    Penicillins Rash     Other reaction(s): Unknown       Current Neurological Medications:  Aspirin and statin    No current facility-administered medications on file prior to encounter.      Current Outpatient Medications on File Prior to Encounter   Medication Sig    aspirin (ECOTRIN) 81 MG EC tablet Take 162 mg by mouth once daily. 2 at night    cyanocobalamin (VITAMIN B-12) 1000 MCG tablet Take 100 mcg by mouth once daily.    docusate sodium (COLACE) 100 MG capsule Take 100 mg by mouth 2 (two) times daily.    fluticasone propionate (FLONASE) 50 mcg/actuation nasal spray 1 spray (50 mcg total) by Each Nostril route once daily.    hydrOXYzine HCL (ATARAX) 10  MG Tab Take 1 tablet (10 mg total) by mouth nightly as needed.    lisinopriL (PRINIVIL,ZESTRIL) 20 MG tablet Take 1 tablet (20 mg total) by mouth once daily.    pantoprazole (PROTONIX) 40 MG tablet Take 1 tablet (40 mg total) by mouth once daily.    pramipexole (MIRAPEX) 0.125 MG tablet Take 1 tablet (0.125 mg total) by mouth nightly as needed (rls).    pravastatin (PRAVACHOL) 10 MG tablet Take 1 tablet (10 mg total) by mouth once daily.    triamcinolone acetonide 0.1% (KENALOG) 0.1 % cream Apply topically 2 (two) times daily.      Family History     Problem Relation (Age of Onset)    Alcohol abuse Brother, Brother    Cancer Brother, Brother    Heart disease Mother, Father    No Known Problems Paternal Grandmother, Paternal Grandfather, Son    Parkinsonism Brother        Tobacco Use    Smoking status: Never Smoker    Smokeless tobacco: Never Used   Substance and Sexual Activity    Alcohol use: No    Drug use: No    Sexual activity: Never     Review of Systems   Unable to perform ROS: Mental status change     Objective:     Vital Signs (Most Recent):  Temp: 97.2 °F (36.2 °C) (10/22/20 0739)  Pulse: 94 (10/22/20 0745)  Resp: 18 (10/22/20 0745)  BP: (!) 116/55 (10/22/20 0739)  SpO2: 95 % (10/22/20 0745) Vital Signs (24h Range):  Temp:  [97.2 °F (36.2 °C)-97.4 °F (36.3 °C)] 97.2 °F (36.2 °C)  Pulse:  [] 94  Resp:  [17-20] 18  SpO2:  [80 %-100 %] 95 %  BP: (101-144)/(53-77) 116/55     Weight: 57.8 kg (127 lb 6.8 oz)  Body mass index is 21.2 kg/m².    Physical Exam  HENT:      Head: Normocephalic.      Mouth/Throat:      Mouth: Mucous membranes are moist.   Cardiovascular:      Rate and Rhythm: Normal rate.   Pulmonary:      Effort: Pulmonary effort is normal.   Musculoskeletal:      Comments: limted ROM   Skin:     General: Skin is warm.   Neurological:      Mental Status: She is lethargic.   Psychiatric:      Comments: Limited cooperation/pt lethargic         NEUROLOGICAL EXAMINATION:     MENTAL STATUS    Speech: (No verbal response at this time)  Level of consciousness: responsive to painful stimuli ,  arousable by tactile stimuli  Unable to name object. Unable to repeat.     CRANIAL NERVES     CN V   Facial sensation intact.     MOTOR EXAM        Limited cooperation with exam       Significant Labs:   Lab Results   Component Value Date    WBC 14.61 (H) 10/22/2020    HGB 11.2 (L) 10/22/2020    HCT 37.7 10/22/2020    MCV 95 10/22/2020     (L) 10/22/2020       CMP  Sodium   Date Value Ref Range Status   10/22/2020 139 136 - 145 mmol/L Final     Potassium   Date Value Ref Range Status   10/22/2020 6.0 (H) 3.5 - 5.1 mmol/L Final     Chloride   Date Value Ref Range Status   10/22/2020 107 95 - 110 mmol/L Final     CO2   Date Value Ref Range Status   10/22/2020 17 (L) 23 - 29 mmol/L Final     Glucose   Date Value Ref Range Status   10/22/2020 144 (H) 70 - 110 mg/dL Final     BUN, Bld   Date Value Ref Range Status   10/22/2020 62 (H) 10 - 30 mg/dL Final     Creatinine   Date Value Ref Range Status   10/22/2020 2.6 (H) 0.5 - 1.4 mg/dL Final     Calcium   Date Value Ref Range Status   10/22/2020 9.0 8.7 - 10.5 mg/dL Final     Total Protein   Date Value Ref Range Status   10/22/2020 6.1 6.0 - 8.4 g/dL Final     Albumin   Date Value Ref Range Status   10/22/2020 2.7 (L) 3.5 - 5.2 g/dL Final     Total Bilirubin   Date Value Ref Range Status   10/22/2020 0.4 0.1 - 1.0 mg/dL Final     Comment:     For infants and newborns, interpretation of results should be based  on gestational age, weight and in agreement with clinical  observations.  Premature Infant recommended reference ranges:  Up to 24 hours.............<8.0 mg/dL  Up to 48 hours............<12.0 mg/dL  3-5 days..................<15.0 mg/dL  6-29 days.................<15.0 mg/dL       Alkaline Phosphatase   Date Value Ref Range Status   10/22/2020 71 55 - 135 U/L Final     AST   Date Value Ref Range Status   10/22/2020 179 (H) 10 - 40 U/L Final     ALT   Date  Value Ref Range Status   10/22/2020 74 (H) 10 - 44 U/L Final     Anion Gap   Date Value Ref Range Status   10/22/2020 15 8 - 16 mmol/L Final     eGFR if    Date Value Ref Range Status   10/22/2020 18 (A) >60 mL/min/1.73 m^2 Final     eGFR if non    Date Value Ref Range Status   10/22/2020 15 (A) >60 mL/min/1.73 m^2 Final     Comment:     Calculation used to obtain the estimated glomerular filtration  rate (eGFR) is the CKD-EPI equation.        .lastlipds    Significant Imaging: MRA Neck without contrast  Narrative: EXAMINATION:  MRA NECK WITHOUT CONTRAST    CLINICAL HISTORY:  Stroke, follow up;    TECHNIQUE:  2D TOF MRA of the carotid and vertebral arteries was performed with 3D reconstructions according to the standard neck MRA protocol.  Contrast was not administered.  Internal carotid artery stenosis is evaluated using NASCET criteria.    COMPARISON:  Brain MRI and head MRA obtained concurrently    FINDINGS:  Moderate to marked patient motion limits evaluation of the neck arteries.  There is flow related signal intensity identified within the vertebral and carotid arteries.  The left vertebral artery is dominant and patent.  The hypoplastic right vertebral artery is patent as well.  There is no critical stenosis or occlusion.  Any subtle or mild nonocclusive stenosis is suboptimally evaluated due to moderate to marked patient motion.  Impression: 1. As above    Electronically signed by: Lc Valdez MD  Date:    10/22/2020  Time:    11:14  MRA Brain  Narrative: EXAMINATION:  MRA BRAIN WITHOUT CONTRAST    CLINICAL HISTORY:  Stroke, follow up;    TECHNIQUE:  3D Time-of-flight Images were performed over the brain.  MIP/MPR 3D  reformatted images were created.  Contrast was not administered    COMPARISON:  Brain MRI obtained concurrently    FINDINGS:  The study is motion degraded which limits evaluation of the intracranial arteries.  There is however normal flow related signal  intensity within the vertebral and basilar arteries as well as in the petrous and cavernous segments of the internal carotid arteries.  The carotid terminus is normal.  The basilar tip is normal and there is normal branching into the anterior middle cerebral arteries as well as the posterior circulation vessels.  Any subtle nonocclusive stenosis of the more distal vessels is suboptimally evaluated due to patient motion.  Impression: 1. Patient motion limits evaluation.  There is no large vessel occlusion or critical stenosis suspected.  Any nonocclusive stenosis of the more distal branches of the anterior and posterior circulation is not adequately evaluated due to patient motion.    Electronically signed by: Lc Valdez MD  Date:    10/22/2020  Time:    10:56  MRI BRAIN WITHOUT CONTRAST  Narrative: EXAM:  MRI BRAIN WITHOUT CONTRAST    CLINICAL HISTORY:  Stroke, follow up; .    COMPARISON:  Head CT without contrast dated 10/21/2020    FINDINGS:  Intracranial contents:The study is moderate to markedly motion degraded.  There is moderate volume loss and nonspecific periventricular and scattered subcortical white matter FLAIR and T2 hyperintense signal.  These changes correspond to regions of decreased attenuation on head CT.  These findings likely reflect sequelae of chronic small vessel disease.  There is a chronic lacunar infarction in the right basal ganglia.  There is no hydrocephalus or midline shift.  There is no acute hemorrhage.  There are however 2 small foci of restricted diffusion in the superior and posterior lateral right temporal lobe.  There is no large vascular territory infarction.  There is no abnormal extra-axial fluid collection.  The basilar cisterns are open.  Cerebellar tonsils are in normal position.    Extracranial contents, calvarium, soft tissues:Baseline marrow signal is normal.  The paranasal sinuses and mastoid air cells on the right are clear.  There is partial opacification of the  left mastoid air cells inferiorly representing a small mastoid effusion.  Impression: 1. The study is moderate to markedly motion degraded which somewhat limits evaluation.  There is moderate volume loss and nonspecific white matter change with a chronic lacunar infarction in the right basal ganglia.  Additionally, there are 2 small acute nonhemorrhagic infarctions in the superior and posterior lateral right temporal lobe.  There is no large vascular territory infarction or mass effect.  This report was flagged in Epic as abnormal.    Electronically signed by: Lc Valdez MD  Date:    10/22/2020  Time:    10:51        Assessment and Plan:    AMS  -Stroke Confirmed on MRI:  Two small acute nonhemorrhagic infarctions right temporal lobe, chronic lacunar basal ganglia infarction on the right  -MRA brain/neck:  No large vessel occlusion  -ECHO: pending  -Lipids: 135 chol 76 ldl  -Permissive /110  On day 3, begin decreasing BP by 20%  -continue aspirin and statin: pt allergic to plavix  -A1c: ordered  -TSH 5.429  T4 1.12  PT/OT/SLP    JAS  -Cr 2.3  -IM Managing    -RLS  -continue mirapex    HTN  -chronic    PLAN: ECHO Pending. Continue therapy. No plavix due to allergy. Will follow up.      [phone] days from discharge.     Stroke education was provided including stroke risk factors modification and any acute neurological changes including weakness, confusion, visual changes to come straight to the ER.     All questions were answered.                                        Active Diagnoses:    Diagnosis Date Noted POA    PRINCIPAL PROBLEM:  Stroke [I63.9] 10/21/2020 Yes    Acute kidney injury superimposed on chronic kidney disease [N17.9, N18.9] 10/22/2020 Yes    Other fracture of right femur, initial encounter for closed fracture [S72.8X1A] 10/22/2020 Yes    RLS (restless legs syndrome) [G25.81] 04/28/2017 Yes    Rheumatoid arthritis  involving multiple sites with positive rheumatoid factor, onset 1972 [M05.79] 12/01/2015 Yes    Gastroesophageal reflux disease without esophagitis [K21.9] 11/16/2015 Yes    Mixed hyperlipidemia, baseline LDL not available [E78.2] 02/20/2015 Yes    HTN (hypertension), onset 2014 [I10] 03/14/2012 Yes      Problems Resolved During this Admission:       VTE Risk Mitigation (From admission, onward)         Ordered     IP VTE HIGH RISK PATIENT  Once      10/22/20 0043     Place sequential compression device  Until discontinued      10/22/20 0043     Reason for No Pharmacological VTE Prophylaxis  Once     Question:  Reasons:  Answer:  Physician Provided (leave comment)    10/22/20 0043                Thank you for your consult. I will follow-up with patient. Please contact us if you have any additional questions.    Gabby Thompson NP  Neurology  [facility]

## 2020-10-22 NOTE — NURSING
Pt noted to only have 45cc of urine in [place]. Bladder scan done- ~17mL of urine noted in bladder. Dr. Rm notified. Ordered to continue IV fluids for now. Will monitor.

## 2020-10-22 NOTE — PROGRESS NOTES
Famotidine therapy for Melody Dupont [id number] has been evaluated according to the pharmacy practice protocol.      Based on the patient's Estimated Creatinine Clearance: 13.8 mL/min (A) (based on SCr of 2.3 mg/dL (H))., famotidine therapy has been adjusted to 20 mg once daily.    Thank you,  Betty Acevedo

## 2020-10-22 NOTE — PLAN OF CARE
Cm unable to complete the assessment. Student nurse and instructor with pt. Cm will follow-up.     10/22/20 0929   Discharge Assessment   Assessment Type Discharge Planning Assessment

## 2020-10-22 NOTE — ASSESSMENT & PLAN NOTE
Chronic; stable.  Hold chronic antihypertensives for now to allow for permissive HTN.  Resume chronic medications when clinically appropriate.

## 2020-10-22 NOTE — PT/OT/SLP EVAL
Speech Language Pathology Evaluation  Bedside Swallow    Patient Name:  Melody Dupont   MRN:  [id number]  Admitting Diagnosis: Stroke    Recommendations:                 General Recommendations:  Speech language evaluation, Cognitive-linguistic evaluation and Ongoing swallow evaluation  Diet recommendations:  NPO, NPO   Aspiration Precautions: Frequent oral care   General Precautions: Standard, aphasia, fall, aspiration, NPO  Communication strategies:  provide increased time to answer    History:     Past Medical History:   Diagnosis Date    Anemia     Cataract     Colitis     Disorder of kidney and ureter     Gastric ulcer     Hyperlipidemia     Hypertension     Malnutrition     Osteoporosis     PUD (peptic ulcer disease)     Restless leg syndrome     Rheumatoid arthritis     Stroke 10/21/2020    TIA (transient ischemic attack) 1/23/12       Past Surgical History:   Procedure Laterality Date    ADENOIDECTOMY      FRACTURE SURGERY      left hip ORIF    HAND SURGERY      HYSTERECTOMY      complete    TONSILLECTOMY         Social History: Patient lives alone. Daughter in law visits 3x/day to assist with ADLs.    Prior Intubation HX:  None this admit    Modified Barium Swallow: None in Epic    Imaging:  MRA Neck without contrast   Final Result      1. As above         Electronically signed by: Lc Valdez MD   Date:    10/22/2020   Time:    11:14      MRA Brain   Final Result      1. Patient motion limits evaluation.  There is no large vessel occlusion or critical stenosis suspected.  Any nonocclusive stenosis of the more distal branches of the anterior and posterior circulation is not adequately evaluated due to patient motion.         Electronically signed by: Lc Valdez MD   Date:    10/22/2020   Time:    10:56      MRI BRAIN WITHOUT CONTRAST   Final Result   Abnormal      1. The study is moderate to markedly motion degraded which somewhat limits evaluation.  There is moderate  volume loss and nonspecific white matter change with a chronic lacunar infarction in the right basal ganglia.  Additionally, there are 2 small acute nonhemorrhagic infarctions in the superior and posterior lateral right temporal lobe.  There is no large vascular territory infarction or mass effect.   This report was flagged in Epic as abnormal.         Electronically signed by: Lc Valdez MD   Date:    10/22/2020   Time:    10:51      CT Knee Without Contrast Right   Final Result      1. Severely comminuted, impacted, angulated and displaced fracture of the distal femur, as described.  CT imaging better demonstrates extension of the fracture to the intercondylar notch.   2. No additional acute fractures identified.   3. Additional details, as per report body.         Electronically signed by: iJan Raphael   Date:    10/21/2020   Time:    23:29      X-Ray Femur Ap/Lat Right   Final Result   Abnormal      Acute comminuted and displaced fracture of the distal femoral metaphysis without definite intra-articular extension, as described.      This report was flagged in Epic as abnormal.         Electronically signed by: Jian Raphael   Date:    10/21/2020   Time:    22:18      X-Ray Knee 3 View Right   Final Result   Abnormal      Acute comminuted and displaced fracture of the distal femoral metaphysis without definite intra-articular extension, as described.      This report was flagged in Epic as abnormal.         Electronically signed by: Jian Raphael   Date:    10/21/2020   Time:    22:18      CT Chest Abdomen Pelvis Without Contrast (XPD)   Final Result      1. No acute traumatic findings in the chest, abdomen, or pelvis.   2. Prominent stool within distended distal colon and rectum suggestive of fecal impaction.  Minimal surrounding inflammatory change raise the possibility of stercoral colitis/proctitis.   3. Indeterminate left renal lesions, as noted on prior CT imaging of 06/08/2018, for which  "dedicated renal ultrasound would be recommended, this is not been previously performed.   4. Additional incidental and chronic findings, as per report body.         Electronically signed by: Jian Raphael   Date:    10/21/2020   Time:    21:45      CT Cervical Spine Without Contrast   Final Result      1. No acute cervical spine fracture.   2. Degenerative findings in the cervical spine without critical spinal canal or foraminal narrowing         Electronically signed by: Jian Raphael   Date:    10/21/2020   Time:    21:25      CT Head Without Contrast   Final Result      Moderate brain atrophy with deep white matter ischemic changes.  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  Acute CVA or hemorrhage is not identified on this study.         Electronically signed by: Anuj Bob MD   Date:    10/21/2020   Time:    19:13      US Carotid Bilateral    (Results Pending)      Occupation/hobbies/homemaking: unknown at this time.    Subjective     "Valerie." (otherwise nonverbal t/o evaluation)    Objective:   Pt seen for clinical swallow evaluation. Nsg and MD present upon arrival. Pt opens eyes to repeated verbal/tactile stimulation but difficulty maintaining alertness. L neglect noted. She is lethargic, does not follow commands, does not respond to simple Y/N Q and no attempts at automatic speech tasks. No family at bedside.     Oral Musculature Evaluation  · Oral Musculature: unable to assess due to poor participation/comprehension    Bedside Swallow Eval:   Consistencies Assessed:  · Thin liquids ---Ice chip circled to lips     Oral Phase:   · Pt did not open lips or lick lips with ice circled to lips. Sealed lips closed.     Pharyngeal Phase:   · DNT    Assessment:   Clinical swallow evaluation completed. Pt presents with poor oral readiness for PO diet. REC pt remain NPO. Will follow for ongoing swallow and cognitive communication evaluations.    Goals:   Multidisciplinary Problems     SLP Goals  "       Problem: SLP Goal    Goal Priority Disciplines Outcome   SLP Goal     SLP Ongoing, Progressing   Description:   1) Pt will participate in ongoing swallow and cognitive communication evaluations                   Plan:     · Patient to be seen:  5 x/week   · Plan of Care expires:  11/05/20  · Plan of Care reviewed with:  patient   · SLP Follow-Up:  Yes       Discharge recommendations:  (Pending progres)   Barriers to Discharge:  None and 11    Time Tracking:     SLP Treatment Date:   10/22/20  Speech Start Time:  1150  Speech Stop Time:  1201     Speech Total Time (min):  11 min    Billable Minutes: Eval Swallow and Oral Function 11 and Total Time 11    Dior Min CCC-SLP  10/22/2020

## 2020-10-22 NOTE — PLAN OF CARE
POC reviewed with pt's family, understanding verbalized. Pt very lethargic. Oriented to person, refused other orientation questions. Scheduled IV tylenol for pain. Pt strict NPO. IV fluids infusing per MD order. Closely monitoring K+ level. VSS. Safety maintained. Will monitor.

## 2020-10-22 NOTE — PLAN OF CARE
Problem: Occupational Therapy Goal  Goal: Occupational Therapy Goal  Description: Goals to be met by: 11/11/2020     Patient will increase functional independence with ADLs by performing:    Feeding with Set-up Assistance, Maximum Assistance, and Assistive Devices as needed.  Grooming while seated with Set-up Assistance, Maximum Assistance, and Assistive Devices as needed.  Upper extremity exercise program x 5 reps with assistance as needed.  Follow 80% of 1-step commands.    Outcome: Ongoing, Progressing  OT evaluation completed today. Goals & care plan established.  NED Drake  10/22/2020

## 2020-10-22 NOTE — PLAN OF CARE
POC reviewed with patient, patient verbalized understanding, however need reinforcement, AA, disoriented to time and situation, VSS, RA, no c/o or signs of sob, tylenol IV administration for pain, NPO maintained pending speech evaluation, Q4 neuro checks maintained, aspiration and fall precautions maintained, IVF maintained, buchanan care maintained, tele and safety maintained, bed in lowest position, side rails up X2, call light and table within reach, bed alarm on and audible, no distress noted, will continue to monitor.

## 2020-10-22 NOTE — ASSESSMENT & PLAN NOTE
Advance Care Planning     Living Will  During this visit, I engaged the daughter in law (POA) - Caron Dupont  in the advance care planning process.  The patient and I reviewed the role for advance directives and their purpose in directing future healthcare if the patient's unable to speak for him/herself.  At this point in time, the patient does have full decision-making capacity.  We discussed different extreme health states that she could experience, and reviewed what kind of medical care she would want in those situations.  The daughter in law (POA) communicated that if she were comatose and had little chance of a meaningful recovery, she would not want machines/life-sustaining treatments used. I spent a total of 20 minutes engaging the patient in this advance care planning discussion - 10/22/2020

## 2020-10-22 NOTE — ASSESSMENT & PLAN NOTE
Follow Neurology recommendations.  MRI results reviewed with patient's family.  Neuro checks q4h  Permissive HTN <220/<120 - will hold home antihypertensives and restart when clinically appropriate  Follow Carotid U/S and ECHO  Check fasting lipid profile.  Continue chronic ASA  DVT prophylaxis with DUY's, SCD's.  Consider Lovenox if ok with neurology  Aspiration precautions, fall precautions

## 2020-10-22 NOTE — ASSESSMENT & PLAN NOTE
Likely 2/2 IVVD  Gentle IVF hydration.  Follow renal panel and electrolytes closely.  Adjust renal dose medications for Estimated Creatinine Clearance: 13.8 mL/min (A) (based on SCr of 2.3 mg/dL (H)).  Avoid NSAIDs, Castro-II inhibitors, ACE-I, Angiotensin Receptor Blockers, or Aminoglycosides.  Urine analysis.  Check  Urine Na, Cr, Protein.

## 2020-10-22 NOTE — NURSING
Results for MEGAN RAJPUT (MRN [id number]) as of 10/22/2020 08:52   Ref. Range 10/22/2020 07:54   Sodium Latest Ref Range: 136 - 145 mmol/L 139   Potassium Latest Ref Range: 3.5 - 5.1 mmol/L 6.0 (H)   Chloride Latest Ref Range: 95 - 110 mmol/L 107   CO2 Latest Ref Range: 23 - 29 mmol/L 17 (L)   Anion Gap Latest Ref Range: 8 - 16 mmol/L 15   BUN, Bld Latest Ref Range: 10 - 30 mg/dL 62 (H)   Creatinine Latest Ref Range: 0.5 - 1.4 mg/dL 2.6 (H)   eGFR if non African American Latest Ref Range: >60 mL/min/1.73 m^2 15 (A)   eGFR if African American Latest Ref Range: >60 mL/min/1.73 m^2 18 (A)   Glucose Latest Ref Range: 70 - 110 mg/dL 144 (H)   Calcium Latest Ref Range: 8.7 - 10.5 mg/dL 9.0   Phosphorus Latest Ref Range: 2.7 - 4.5 mg/dL 4.6 (H)   Magnesium Latest Ref Range: 1.6 - 2.6 mg/dL 2.4   Alkaline Phosphatase Latest Ref Range: 55 - 135 U/L 71   PROTEIN TOTAL Latest Ref Range: 6.0 - 8.4 g/dL 6.1   Albumin Latest Ref Range: 3.5 - 5.2 g/dL 2.7 (L)   BILIRUBIN TOTAL Latest Ref Range: 0.1 - 1.0 mg/dL 0.4   AST Latest Ref Range: 10 - 40 U/L 179 (H)   ALT Latest Ref Range: 10 - 44 U/L 74 (H)     Results for MEGAN RAJPUT (MRN [id number]) as of 10/22/2020 08:55   Ref. Range 10/22/2020 07:54   Troponin I Latest Ref Range: 0.000 - 0.026 ng/mL 0.403 (H)     Results for MEGAN RAJPUT (MRN [id number]) as of 10/22/2020 09:28   Ref. Range 10/22/2020 07:54   CPK Latest Ref Range: 20 - 180 U/L 7690 (H)       Dr. Rm notified of abnormal labs. Awaiting new orders. Will monitor.

## 2020-10-22 NOTE — ED NOTES
Notified NICHOLAS White NP of swelling to patient's right knee and c/o pain.  Per daughter in law, knee is twice normal size.  New orders placed.

## 2020-10-22 NOTE — ASSESSMENT & PLAN NOTE
Consult orthopedic surgeon-Spoke with Dr. Curtis.  Recommends long leg posterior splint with stirrups if possible; otherwise, knee immobilizer.  CT right knee obtained per his request.  Lovenox OK from his perspective-will defer decision to neurology.  He does not expect immediate surgical intervention given pt's hx stroke.  Will consult PT/OT when appropriate.  Pain control acutely-will utilize IV tylenol with caution given transaminitis.  Pt's daughter in law reports that pt hallucinates with narcotics.

## 2020-10-22 NOTE — SUBJECTIVE & OBJECTIVE
Interval History:  Elderly frail female, who is not interactive, has a right gaze preference.  Left upper extremity is weak.  Patient denies any subjective complaints.  No chest pain or shortness of breath reported.    Review of Systems   Unable to perform ROS: Mental status change     Objective:     Vital Signs (Most Recent):  Temp: 97.2 °F (36.2 °C) (10/22/20 0739)  Pulse: 94 (10/22/20 0745)  Resp: 18 (10/22/20 0745)  BP: (!) 116/55 (10/22/20 0739)  SpO2: 95 % (10/22/20 0745) Vital Signs (24h Range):  Temp:  [97.2 °F (36.2 °C)-97.4 °F (36.3 °C)] 97.2 °F (36.2 °C)  Pulse:  [] 94  Resp:  [17-20] 18  SpO2:  [80 %-100 %] 95 %  BP: (101-144)/(53-77) 116/55     Weight: 57.8 kg (127 lb 6.8 oz)  Body mass index is 21.2 kg/m².    Intake/Output Summary (Last 24 hours) at 10/22/2020 1005  Last data filed at 10/22/2020 0600  Gross per 24 hour   Intake 523.33 ml   Output 125 ml   Net 398.33 ml      Physical Exam  Vitals signs and nursing note reviewed.   Constitutional:       Appearance: She is well-developed. She is ill-appearing.   HENT:      Head: Normocephalic and atraumatic.      Mouth/Throat:      Mouth: Mucous membranes are dry.   Eyes:      Conjunctiva/sclera: Conjunctivae normal.      Pupils: Pupils are equal, round, and reactive to light.   Neck:      Musculoskeletal: Normal range of motion and neck supple.   Cardiovascular:      Rate and Rhythm: Regular rhythm. Tachycardia present.      Pulses: Normal pulses.   Pulmonary:      Effort: Pulmonary effort is normal. No respiratory distress.      Breath sounds: Normal breath sounds.   Abdominal:      General: Bowel sounds are normal. There is no distension.      Palpations: Abdomen is soft.      Tenderness: There is no abdominal tenderness.   Genitourinary:     Comments: Deferred    Musculoskeletal:         General: Swelling (right knee) and tenderness (right knee) present.   Skin:     General: Skin is warm and dry.      Capillary Refill: Capillary refill takes 2  to 3 seconds.      Coloration: Skin is pale.      Findings: No rash.   Neurological:      Mental Status: She is lethargic.      GCS: GCS eye subscore is 3. GCS verbal subscore is 4. GCS motor subscore is 6.   Psychiatric:         Mood and Affect: Affect is flat.         Behavior: Behavior normal.         Cognition and Memory: Cognition is impaired.         Significant Labs:   CBC:   Recent Labs   Lab 10/21/20  1920 10/22/20  0754   WBC 19.19* 14.61*   HGB 12.1 11.2*   HCT 37.8 37.7    132*     CMP:   Recent Labs   Lab 10/21/20  1920 10/22/20  0754    139   K 5.7* 6.0*    107   CO2 16* 17*   * 144*   BUN 53* 62*   CREATININE 2.3* 2.6*   CALCIUM 9.6 9.0   PROT 6.5 6.1   ALBUMIN 3.1* 2.7*   BILITOT 0.4 0.4   ALKPHOS 75 71   * 179*   ALT 74* 74*   ANIONGAP 18* 15   EGFRNONAA 18* 15*     Lipid Panel:   Recent Labs   Lab 10/21/20  1920   CHOL 136   HDL 41   LDLCALC 76.4   TRIG 93   CHOLHDL 30.1     Urine Studies:   Recent Labs   Lab 10/21/20  2117   COLORU Yellow   APPEARANCEUA Clear   PHUR 7.0   SPECGRAV 1.015   PROTEINUA Trace*   GLUCUA Negative   KETONESU Negative   BILIRUBINUA Negative   OCCULTUA 1+*   NITRITE Negative   UROBILINOGEN Negative   LEUKOCYTESUR Trace*   RBCUA 3   WBCUA 4     CPK> 7 K    Significant Imaging:   Imaging Results          CT Knee Without Contrast Right (Final result)  Result time 10/21/20 23:29:16    Final result by Jian Raphael MD (10/21/20 23:29:16)                 Impression:      1. Severely comminuted, impacted, angulated and displaced fracture of the distal femur, as described.  CT imaging better demonstrates extension of the fracture to the intercondylar notch.  2. No additional acute fractures identified.  3. Additional details, as per report body.      Electronically signed by: Jian Raphael  Date:    10/21/2020  Time:    23:29             Narrative:    EXAMINATION:  CT KNEE WITHOUT CONTRAST RIGHT    CLINICAL HISTORY:  distal femur  fx;    TECHNIQUE:  Unenhanced CT imaging of the knee was performed with multiplanar reformats and bone and standard algorithm.    COMPARISON:  Radiographs of the right knee and femur performed 10/21/2020    FINDINGS:  Again seen is severely comminuted, impacted, and angulated fracture distal femoral diaphysis and metaphysis.  There is approximately 46 degrees of anterior angulation of the distal fracture fragment.  There is approximately 1.1 cm lateral and 1 cm anterior displacement of distal fracture fragment.  At least 1.2 cm of override of the impacted proximal distal fracture fragments.    Current CT examination better demonstrates fracture extension to the intercondylar notch.  No definite extension to the tibiofemoral joint space.  No definite acute fractures of the visualized proximal tibia or fibula.  No fracture of the patella identified.    Severe osteoarthritic changes of the medial and lateral joint space are noted.  Moderate joint space narrowing and osteophyte formation of the patellofemoral compartment.    Posttraumatic lipohemarthrosis is noted.  Posttraumatic findings are noted in the soft tissues about the knee joint and proximal tibia.                                X-Ray Femur Ap/Lat Right (Final result)  Result time 10/21/20 22:18:28    Final result by Jian Raphael MD (10/21/20 22:18:28)                 Impression:      Acute comminuted and displaced fracture of the distal femoral metaphysis without definite intra-articular extension, as described.    This report was flagged in Epic as abnormal.      Electronically signed by: Jian Raphael  Date:    10/21/2020  Time:    22:18             Narrative:    EXAMINATION:  XR KNEE 3 VIEW RIGHT; XR FEMUR 2 VIEW RIGHT    CLINICAL HISTORY:  fall; Unspecified fall, initial encounter    TECHNIQUE:  AP, lateral, and Merchant views of the right knee were performed.    COMPARISON:  Right knee radiographs performed 10/24/2017 and  05/24/2018    FINDINGS:  Acute comminuted and displaced fracture of the distal femoral metaphysis.  There is approximately 1.3 cm of lateral displacement of the proximal fracture fragment.  There is approximately 27 degrees of anterior angulation of the proximal fracture fragment.  No definite extension to the articular surface.    No definite additional acute fractures are seen.  Osseous demineralization is identified.  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  Vascular calcifications.  The joint effusion is identified.  Soft tissue swelling about the knee joint.                                X-Ray Knee 3 View Right (Final result)  Result time 10/21/20 22:18:28   Procedure changed from X-Ray Knee 1 or 2 View Right     Final result by Jian Raphael MD (10/21/20 22:18:28)                 Impression:      Acute comminuted and displaced fracture of the distal femoral metaphysis without definite intra-articular extension, as described.    This report was flagged in Epic as abnormal.      Electronically signed by: Jian Raphael  Date:    10/21/2020  Time:    22:18             Narrative:    EXAMINATION:  XR KNEE 3 VIEW RIGHT; XR FEMUR 2 VIEW RIGHT    CLINICAL HISTORY:  fall; Unspecified fall, initial encounter    TECHNIQUE:  AP, lateral, and Merchant views of the right knee were performed.    COMPARISON:  Right knee radiographs performed 10/24/2017 and 05/24/2018    FINDINGS:  Acute comminuted and displaced fracture of the distal femoral metaphysis.  There is approximately 1.3 cm of lateral displacement of the proximal fracture fragment.  There is approximately 27 degrees of anterior angulation of the proximal fracture fragment.  No definite extension to the articular surface.    No definite additional acute fractures are seen.  Osseous demineralization is identified.  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  Vascular calcifications.  The joint effusion is identified.  Soft tissue swelling about the  knee joint.                               CT Chest Abdomen Pelvis Without Contrast (XPD) (Final result)  Result time 10/21/20 21:45:57    Final result by Jian Raphael MD (10/21/20 21:45:57)                 Impression:      1. No acute traumatic findings in the chest, abdomen, or pelvis.  2. Prominent stool within distended distal colon and rectum suggestive of fecal impaction.  Minimal surrounding inflammatory change raise the possibility of stercoral colitis/proctitis.  3. Indeterminate left renal lesions, as noted on prior CT imaging of 06/08/2018, for which dedicated renal ultrasound would be recommended, this is not been previously performed.  4. Additional incidental and chronic findings, as per report body.      Electronically signed by: Jian Raphael  Date:    10/21/2020  Time:    21:45             Narrative:    EXAMINATION:  CT CHEST ABDOMEN PELVIS WITHOUT CONTRAST(XPD)    CLINICAL HISTORY:  Chest-abdomen-pelvis trauma, blunt;fall,;    TECHNIQUE:  Low dose axial images, sagittal and coronal reformations were obtained from the thoracic inlet to the pubic symphysis    COMPARISON:  CT abdomen and pelvis performed 06/18/2018.  CT chest performed 03/02/2017.    FINDINGS:  This examination is limited due to lack of intravenous contrast.    CHEST    Support tubes and lines: None.    Aorta: Not dilated.  Atherosclerotic calcification.    Heart: Mildly enlarged.    Coronary arteries: Coronary artery and valvular calcifications are noted.    Pericardium: Normal. No effusion, thickening, or calcification.    Central pulmonary arteries: Normal caliber.    Base of neck/thyroid: Unremarkable.    Lymph nodes: No supraclavicular, axillary, internal mammary, mediastinal, or hilar adenopathy.    Esophagus: Patulous and fluid-filled esophagus.    Pleura: Calcified pleural plaque is noted at the left lung apex.  No pneumothorax or pleural effusion.    Body wall: Unremarkable.    Airways: Unremarkable.    Lungs:  Evaluation of the lung parenchyma is somewhat limited due to motion.  Additionally, comparison to prior dedicated chest CT of 03/02/2017 is limited due to differences in technique/slice thickness.  Again noted are peripheral and lower lobe predominant reticulation and bronchial wall thickening.  No evidence of honeycombing is noted.  Some degree of traction bronchiectasis is suggested at the lung apices associated with pleuroparenchymal scarring.  Elsewhere, there are areas of nonspecific mosaic attenuation, which may be on the basis of small vessel and/or small airways disease.  Overall pattern of findings is felt relatively similar to the prior study of 03/02/2017.    Bones: Osseous mineralization.  Multilevel mild height loss of several thoracic vertebral bodies without definite change since 03/02/2017 CT.    ABDOMEN/PELVIS    Liver: Normal contour.    Gallbladder and bile ducts: Unremarkable.    Pancreas: Normal contour.    Spleen: Normal contour.    Adrenals: Normal contour.    Kidneys: 2 hyperattenuating foci are noted in the right kidney, the larger of which is located in the midpole and measures approximately 16 mm (series 2, image 131 in the smaller which is located in the inferior pole measuring approximately 9 mm.  Simple cysts is noted in the superior to mid pole.  No concerning lesions are identified in the right kidney.    Lymph nodes: No abdominal or pelvic lymphadenopathy.    Bowel and mesentery: Small hiatal hernia.  Pronounced amount of stool is noted within the rectum and distal colon.  No definite wall thickening of the involved bowel.  There may be minimal surrounding inflammatory change in the surrounding fat visualized small bowel colon are otherwise unremarkable.    Abdominal aorta: Heavy atherosclerotic calcification of the aorta and iliac arteries.    Inferior vena cava: Unremarkable.    Free fluid or free air: None.    Pelvis: The uterus appears to be surgically absent.    Urinary bladder:  Unremarkable.    Body wall: Unremarkable.    Bones: Chronic deformity of the left proximal femur with suggestion of prior internal fixation.  No metallic hardware is identified on the current study.  Additionally, there is evidence of a chronic right superior and inferior pubic ramus fracture.  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  No definite acute thoracic or lumbar spine fracture is appreciated.                               CT Cervical Spine Without Contrast (Final result)  Result time 10/21/20 21:25:51    Final result by Jian Raphael MD (10/21/20 21:25:51)                 Impression:      1. No acute cervical spine fracture.  2. Degenerative findings in the cervical spine without critical spinal canal or foraminal narrowing      Electronically signed by: Jian Raphael  Date:    10/21/2020  Time:    21:25             Narrative:    EXAMINATION:  CT CERVICAL SPINE WITHOUT CONTRAST    CLINICAL HISTORY:  Neck trauma (Age => 65y);    TECHNIQUE:  Low dose axial images, sagittal and coronal reformations were performed though the cervical spine.  Contrast was not administered.    COMPARISON:  None    FINDINGS:  Fractures: No acute fractures    Alignment: No definite acute traumatic subluxation.  There is grade 1 anterolisthesis of C4 on C5 and C5 on C6.  Atlanto-axial and atlanto-occipital joints: Atlanto-axial and atlanto-occipital intervals are not widened.  Facet joints: There is no traumatic facet joint widening.  Degenerative facet arthropathy.  Vertebral bodies: Osseous demineralization.  Degenerative plate change most advanced spanning the C5-6 through C7-T1 levels.  Discs: Degenerate disc osteophyte complexes.  Spinal canal and foraminal narrowing: Although CT does not optimally evaluate the soft tissue contents of the spinal canal and foramina, no critical stenosis is suggested.  Paraspinal soft tissues: Soft tissue atherosclerotic vascular calcifications are noted.    Upper  Lungs:Nonspecific debris is noted within a patulous esophagus.  Fibrotic changes are noted in the lung apices.  There is bronchiectasis and suggestion of calcified pleural plaque on the left.                               CT Head Without Contrast (Final result)  Result time 10/21/20 19:13:37    Final result by Anuj Bob Jr., MD (10/21/20 19:13:37)                 Impression:      Moderate brain atrophy with deep white matter ischemic changes.  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  Acute CVA or hemorrhage is not identified on this study.      Electronically signed by: Anuj Bob MD  Date:    10/21/2020  Time:    19:13             Narrative:    EXAMINATION:  CT HEAD WITHOUT CONTRAST    CLINICAL HISTORY:  Neuro deficit, acute, stroke suspected;    TECHNIQUE:  Low dose axial images were obtained through the head.  Coronal and sagittal reformations were also performed. Contrast was not administered.    COMPARISON:  MRI brain of February 23, 2015.    FINDINGS:  No cranial fracture is identified.  Scalp edema or hematoma is not noted.    The basal cisterns are clear.  There is no mass effect or midline shift.  There is moderate enlargement of the lateral ventricles and sylvian fissures consistent with brain atrophy.  Hypodensity in the central white matter of the cerebrum bilaterally is consistent with deep white matter ischemic changes.  There is a 7 mm near CSF focal density of the right internal capsule consistent with an old lacunar infarct.    Focal area of increased or decreased CT density consistent with tumor, edema, acute CVA or hemorrhage is not seen.  No intracranial hemorrhage or hematoma is noted.

## 2020-10-22 NOTE — PLAN OF CARE
"Cm completed the assessment with pt's daughter-in-law Caron ( [phone]).  Pt lives alone and Katie goes over three times a day to check on pt and assist pt with food and transfer her from bed to Lift chair and wheelchair.  PCP is Dr. Wells. Insurance verified as Medicare/[facility](Medicare Supplement).  Caron denies diabetes, dialysis dn coumadin.  Disposition:  Cm communicated with Caron about discharging planning. She does not want pt live in the nursing home, but okay with her going for Rehab.  She verbalized, "She will make a decision as soon as she finds out more on pt's condition."  She will wait to sign the disclosure form.   Pharmacy of choice is [facility].   No other needs verbalized at this time. Cm will continue to follow-up.       10/22/20 1219   Discharge Assessment   Assessment Type Discharge Planning Assessment   Confirmed/corrected address and phone number on facesheet? Yes   Assessment information obtained from? Caregiver   Expected Length of Stay (days) 3   Communicated expected length of stay with patient/caregiver yes   Prior to hospitilization cognitive status: Alert/Oriented   Prior to hospitalization functional status: Needs Assistance;Wheelchair Bound   Current cognitive status: Alert/Oriented   Current Functional Status: Wheelchair Bound;Needs Assistance   Facility Arrived From: home   Lives With alone   Able to Return to Prior Arrangements no   Is patient able to care for self after discharge? No   Who are your caregiver(s) and their phone number(s)? peggy-in-law Caron Dupont - [phone]   Patient's perception of discharge disposition home or selfcare;home health   Readmission Within the Last 30 Days no previous admission in last 30 days   Patient currently being followed by outpatient case management? No   Patient currently receives any other outside agency services? No   Equipment Currently Used at Home wheelchair;lift device  (Lift Chair)   Do you have any problems affording any " of your prescribed medications? No   Is the patient taking medications as prescribed? yes   Does the patient have transportation home? Yes   Transportation Anticipated family or friend will provide   Dialysis Name and Scheduled days na   Does the patient receive services at the [facility]madin Clinic? No   Discharge Plan A Skilled Nursing Facility   Discharge Plan B Skilled Nursing Facility   DME Needed Upon Discharge  none   Patient/Family in Agreement with Plan yes

## 2020-10-22 NOTE — PT/OT/SLP PROGRESS
Physical Therapy      Patient Name:  Melody Dupont   MRN:  [id number]    Patient not seen today secondary to CT/MRI. Will follow-up in afternoon on 10/22/2020.    Chris Megilligan, PT

## 2020-10-22 NOTE — ASSESSMENT & PLAN NOTE
Likely 2/2 IVVD. Gentle IVF hydration.  Follow renal panel and electrolytes closely.  Adjust renal dose medications for Estimated Creatinine Clearance: 12.2 mL/min (A) (based on SCr of 2.6 mg/dL (H)).  Avoid NSAIDs, Castro-II inhibitors, ACE-I, Angiotensin Receptor Blockers, or Aminoglycosides.  Urine analysis.

## 2020-10-22 NOTE — HPI
Melody Dupont is a 89 y/o female with a past medical history significant for HTN, HLD, TIA, anemia, RA, RLS, and GERD who presented to the ED with c/o altered mental status.  Pt is accompanied by her daughter in law who provides the history.  Per pt's daughter in law, pt lives alone.  Her daughter in law visits her 3 times daily to assist her with ADL's.  Pt was noted to be in her normal state of health this afternoon around 1 pm.  This evening when pt's daughter in law went back to check on her and help her get ready for bed, she found the patient to be lethargic and vomiting.  Pt was unable to move her left side.  She further relays that pt had an unwitnessed fall yesterday and her right knee is much more swollen than normal.   Pt was assessed for acute stroke while in the ED and determined not to be a candidate for tPA.  Labs are significant for transaminitis, hyperkalemia of 5.7 and JAS with creatinine of 2.3.  CT head was negative for anything acute.  CT C-spine was negative for fracture.  CT chest/abd/pelvis was significant for fecal impaction.  An xray of the patient's right knee was obtained and she is found to have an acute distal femur fracture.  The case was discussed with Dr. Curtis, orthopedic surgeon, and further recommendations were noted.  The patient will be admitted to the service of hospital medicine for continued treatment.

## 2020-10-22 NOTE — CONSULTS
[facility]  Vascular Neurology  Comprehensive Stroke Center  Tele-Consultation Note      Consults    Consulting Provider: BRITTNEY LINO  Current Providers  No providers found    Patient Location:  [facility] EMERGENCY DEPARTMENT Emergency Department  Spoke hospital nurse at bedside with patient assisting consultant.     Patient information was obtained from patient and ED nurse.         Assessment/Plan:   89 y/o with HTN, HLD, TIA, RLS, brought in due to altered mental status, L sided weakness, and partial R gaze.  She is DNR.  NIHSS 10, VAN+. CT head without definitive acute abnormality.  Suspect R hemispheric infarct but patient out of the window for treatment with iv alteplase (LKW yesterday, unknown time) . Similarly, she is not a suitable candidate for endovascular intervention and therefore did not recommend STAT vascular imaging.  Recommended admission, MRI/MRA brain, TTE, neurology, PT/OT and speech consults in am.  ASA.          STROKE DOCUMENTATION     Acute Stroke Times:   Acute Stroke Times   Last Known Normal Date: 10/20/20  Last Known Normal Time: (unclear)  Symptom Onset Date: 10/20/20  Symptom Onset Time: 0900(unclear)  Stroke Team Called Date: 10/21/20  Stroke Team Called Time: 1927  Stroke Team Arrival Date: 10/21/20  Stroke Team Arrival Time: 1932  CT Interpretation Time: 1932  Decision to Treat Time for Alteplase: (No alteplase)  Decision to Treat Time for IR: (No IR)    NIH Scale:  Interval: baseline  1a. Level of Consciousness: 0-->Alert, keenly responsive  1b. LOC Questions: 1-->Answers one question correctly  1c. LOC Commands: 0-->Performs both tasks correctly  2. Best Gaze: 1-->Partial gaze palsy, gaze is abnormal in one or both eyes, but forced deviation or total gaze paresis is not present  3. Visual: 0-->No visual loss  4. Facial Palsy: 0-->Normal symmetrical movements  5a. Motor Arm, Left: 4-->No movement  5b. Motor Arm, Right: 0-->No drift, limb holds  "90 (or 45) degrees for full 10 secs  6a. Motor Leg, Left: 4-->No movement  6b. Motor Leg, Right: 0-->No drift, leg holds 30 degree position for full 5 secs  7. Limb Ataxia: 0-->Absent  8. Sensory: 0-->Normal, no sensory loss  9. Best Language: 0-->No aphasia, normal  10. Dysarthria: 0-->Normal  11. Extinction and Inattention (formerly Neglect): 0-->No abnormality  Total (NIH Stroke Scale): 10     Modified [place] Score: 1  [place] Coma Scale:14   ABCD2 Score:    [id number] Score:   HAS -BLED Score:   ICH Score:   Hunt [employer] Johnson Classification:       Diagnoses: acute R brain infarct.  No new Assessment & Plan notes have been filed under this hospital service since the last note was generated.  Service: Vascular Neurology      Blood pressure (!) 112/53, pulse 91, temperature 97.2 °F (36.2 °C), temperature source Oral, resp. rate 20, height 5' 5" (1.651 m), weight 49.9 kg (110 lb), SpO2 100 %.  Alteplase Eligible?: No  Alteplase Recommendation: Alteplase not recommended due to Outside of treatment window   Possible Interventional Revascularization Candidate? symptomatic >24 h, DNR,     Disposition Recommendation: admit to inpatient    Subjective:     History of Present Illness: 89 y/o with HTN, HLD, TIA, RLS, brought in due to altered mental status, L sided weakness, and partial R gaze.  Patient reportedly had had L sided weakness since yesterday, and this morning at 6 am EMS was called after she had a fall.      No notes on file      Woke up with symptoms?: no    Recent bleeding noted: no  Does the patient take any Blood Thinners? no  Medications: Antiplatelets:  none      Past Medical History: hypertension, hyperlipidemia, tia and RLS    Past Surgical History: no major surgeries within the last 2 weeks    Family History: no relevant history    Social History: no smoking, no drinking, no drugs    Allergies: Antihistamines - Alkylamine  Hydrocodone  Ibuprofen  Plavix " "[Clopidogrel]  Prednisone  Tramadol  Triamcinolone  Penicillins No known drug allergies    Review of Systems   Unable to perform ROS: Mental status change     Objective:   Vitals: Blood pressure (!) 112/53, pulse 91, temperature 97.2 °F (36.2 °C), temperature source Oral, resp. rate 20, height 5' 5" (1.651 m), weight 49.9 kg (110 lb), SpO2 100 %. BP: 112/53, Respiratory Rate: 17 and Heart Rate: 78    CT READ: Yes  No hemmorhage. No mass effect. No early infarct signs.     Physical Exam  Vitals signs and nursing note reviewed.   Constitutional:       General: She is not in acute distress.     Appearance: Normal appearance.   HENT:      Head: Normocephalic and atraumatic.      Nose: Nose normal.   Eyes:      Comments: Partial gaze to the R that can be overcome   Neck:      Musculoskeletal: Normal range of motion.   Cardiovascular:      Rate and Rhythm: Normal rate and regular rhythm.   Pulmonary:      Effort: No respiratory distress.   Abdominal:      General: There is no distension.   Genitourinary:     Comments: No performed  Musculoskeletal:      Right lower leg: No edema.      Left lower leg: No edema.   Skin:     Findings: No erythema or rash.   Neurological:      Mental Status: She is alert.      Comments: Disoriented to place and year. Follows commands, no aphasia or dysarthria, partial gaze to the R, dense L hemiplegia.   Psychiatric:         Mood and Affect: Mood normal.         Behavior: Behavior normal.               Recommended the emergency room physician to have a brief discussion with the patient and/or family if available regarding the risks and benefits of treatment, and to briefly document the occurrence of that discussion in his clinical encounter note.     The attending portion of this evaluation, treatment, and documentation was performed per Leander Serna MD via audiovisual.    Billing code:  (moderate to severe stroke, large areas of edema, some mimics)    · This patient has a critical " neurological condition/illness, with high morbidity and mortality.  · There is a high probability for acute neurological change leading to clinical and possibly life-threatening deterioration requiring highest level of physician preparedness for urgent intervention.  · Care was coordinated with other physicians involved in the patient's care.  · Radiologic studies and laboratory data were reviewed and interpreted, and plan of care was re-assessed based on the results.  · Diagnosis, treatment options and prognosis may have been discussed with the patient and/or family members or caregiver.  · Further advanced medical management and further evaluation is warranted for his care.      In your opinion, this was a: Tier 2 VAN +    Consult End Time: 745 PM     Leander Serna MD  Comprehensive Stroke Center  Vascular Neurology   [facility]

## 2020-10-22 NOTE — PT/OT/SLP EVAL
Physical Therapy Evaluation    Patient Name:  Melody Dupont   MRN:  [id number]    Recommendations:     Discharge Recommendations:  nursing facility, skilled   Discharge Equipment Recommendations: other (see comments)(unclear at this time)   Barriers to discharge: None    Assessment:     Melody Dupont is a 89 y.o. female admitted with a medical diagnosis of Stroke.  She presents with the following impairments/functional limitations:  weakness, impaired endurance, impaired functional mobilty, gait instability, impaired balance, impaired cognition, decreased lower extremity function, decreased safety awareness, pain, decreased ROM .  Patient did not speak at all during evaluation today.Patient presented supine in bed and required total assist to transfer to sit and then max assist to maintain sitting EOB.    Rehab Prognosis: Fair; patient would benefit from acute skilled PT services to address these deficits and reach maximum level of function.    Recent Surgery: * No surgery found *      Plan:     During this hospitalization, patient to be seen 5 x/week to address the identified rehab impairments via gait training, therapeutic activities, therapeutic exercises and progress toward the following goals:    · Plan of Care Expires:  11/27/20    Subjective     Chief Complaint: none given  Patient/Family Comments/goals: none given  Pain/Comfort:  ·      Patients cultural, spiritual, [employer]al conflicts given the current situation:      Living Environment:  Currently was living alone.  Prior to admission, patients level of function was modified independent.  Equipment used at home: other (see comments)(unclear at this time).  DME owned (not currently used): none.  Upon discharge, patient will have assistance from family.    Objective:     Communicated with nurse prior to session.  Patient found supine with bed alarm, peripheral IV  upon PT entry to room.    General Precautions: Standard, fall   Orthopedic  Precautions:RLE non weight bearing   Braces:       Exams:  · RLE ROM: Deficits: no AROM at any joint  · RLE Strength: Deficits: 0/5  · LLE ROM: Deficits: no AROM at any joint  · LLE Strength: Deficits: 0/5    Functional Mobility:  · Bed Mobility:     · Supine to Sit: total assistance  · Sit to Supine: total assistance  · Balance: sitting EOB with max assist    Therapeutic Activities and Exercises:   none given    AM-PAC 6 CLICK MOBILITY  Total Score:      Patient left supine with call button in reach, bed alarm on and nurse notified.    GOALS:   Multidisciplinary Problems     Physical Therapy Goals        Problem: Physical Therapy Goal    Goal Priority Disciplines Outcome Goal Variances Interventions   Physical Therapy Goal     PT, PT/OT Ongoing, Progressing     Description: Goals to be met by: 2020    Patient will increase functional independence with mobility by performin. Supine to sit with Moderate Assistance  2. Sit to supine with Moderate Assistance  3. Bed to chair transfer with Maximum Assistance using Rolling Walker  5. Sitting at edge of bed x5 minutes with Minimal Assistance                     History:     Past Medical History:   Diagnosis Date    Anemia     Cataract     Colitis     Disorder of kidney and ureter     Gastric ulcer     Hyperlipidemia     Hypertension     Malnutrition     Osteoporosis     PUD (peptic ulcer disease)     Restless leg syndrome     Rheumatoid arthritis     Stroke 10/21/2020    TIA (transient ischemic attack) 12       Past Surgical History:   Procedure Laterality Date    ADENOIDECTOMY      FRACTURE SURGERY      left hip ORIF    HAND SURGERY      HYSTERECTOMY      complete    TONSILLECTOMY         Time Tracking:     PT Received On: 10/22/20  PT Start Time: 1328     PT Stop Time: 1345  PT Total Time (min): 17 min     Billable Minutes: Evaluation 17      Chris Megilligan, PT  10/22/2020

## 2020-10-22 NOTE — PLAN OF CARE
Intervention: collaboration with care providers     Recommendation:  1) Advance PO diet to regular, texture per SLP- no salt packets   -renal if K and phos remain elevated  2) Add boost Plus BID- chocolate or strawberry   -or novasource if renal function does not improve    3) If unable to advance PO diet in < 4 days rec. PEG placement and initiate TF:   Isosource 1.5 @ 20 ml/hr advancing to goal of 40 ml/hr + 130 ml flush q 4 hr   ( provides 1440 kcal ( 99% EEN), 65 g protein ( 100% EPN), and 729 ml free water)     4) weigh pt weekly    Goals: 1) PO diet advanced or nutrition support initiated in < 4 days  Nutrition Goal Status: new  Communication of RD Recs: (POC, sticky note)

## 2020-10-22 NOTE — PLAN OF CARE
Problem: SLP Goal  Goal: SLP Goal  Description:   1) Pt will participate in ongoing swallow and cognitive communication evaluations  Outcome: Ongoing, Progressing     Clinical swallow evaluation completed. Pt presents with poor oral readiness for PO diet. REC pt remain NPO. Will follow for ongoing swallow and cognitive communication evaluations.

## 2020-10-22 NOTE — PLAN OF CARE
Problem: Physical Therapy Goal  Goal: Physical Therapy Goal  Description: Goals to be met by: 2020    Patient will increase functional independence with mobility by performin. Supine to sit with Moderate Assistance  2. Sit to supine with Moderate Assistance  3. Bed to chair transfer with Maximum Assistance using Rolling Walker  5. Sitting at edge of bed x5 minutes with Minimal Assistance    Outcome: Ongoing, Progressing

## 2020-10-22 NOTE — PROGRESS NOTES
[facility] Hospital Medicine  Progress Note    Patient Name: Melody Dupont  MRN: [id number]  Patient Class: IP- Inpatient   Admission Date: 10/21/2020  Length of Stay: 1 days  Attending Physician: Natalia Rm MD  Primary Care Provider: Larisa Wells MD        Subjective:     Principal Problem:Stroke        HPI:  Melody Dupont is a 89 y/o female with a past medical history significant for HTN, HLD, TIA, anemia, RA, RLS, and GERD who presented to the ED with c/o altered mental status.  Pt is accompanied by her daughter in law who provides the history.  Per pt's daughter in law, pt lives alone.  Her daughter in law visits her 3 times daily to assist her with ADL's.  Pt was noted to be in her normal state of health this afternoon around 1 pm.  This evening when pt's daughter in law went back to check on her and help her get ready for bed, she found the patient to be lethargic and vomiting.  Pt was unable to move her left side.  She further relays that pt had an unwitnessed fall yesterday and her right knee is much more swollen than normal.   Pt was assessed for acute stroke while in the ED and determined not to be a candidate for tPA.  Labs are significant for transaminitis, hyperkalemia of 5.7 and JAS with creatinine of 2.3.  CT head was negative for anything acute.  CT C-spine was negative for fracture.  CT chest/abd/pelvis was significant for fecal impaction.  An xray of the patient's right knee was obtained and she is found to have an acute distal femur fracture.  The case was discussed with Dr. Curtis, orthopedic surgeon, and further recommendations were noted.  The patient will be admitted to the service of hospital medicine for continued treatment.           Overview/Hospital Course:  No notes on file    Interval History:  Elderly frail female, who is not interactive, has a right gaze preference.  Left upper extremity is weak.  Patient denies any subjective complaints.  No chest  pain or shortness of breath reported.    Review of Systems   Unable to perform ROS: Mental status change     Objective:     Vital Signs (Most Recent):  Temp: 97.2 °F (36.2 °C) (10/22/20 0739)  Pulse: 94 (10/22/20 0745)  Resp: 18 (10/22/20 0745)  BP: (!) 116/55 (10/22/20 0739)  SpO2: 95 % (10/22/20 0745) Vital Signs (24h Range):  Temp:  [97.2 °F (36.2 °C)-97.4 °F (36.3 °C)] 97.2 °F (36.2 °C)  Pulse:  [] 94  Resp:  [17-20] 18  SpO2:  [80 %-100 %] 95 %  BP: (101-144)/(53-77) 116/55     Weight: 57.8 kg (127 lb 6.8 oz)  Body mass index is 21.2 kg/m².    Intake/Output Summary (Last 24 hours) at 10/22/2020 1005  Last data filed at 10/22/2020 0600  Gross per 24 hour   Intake 523.33 ml   Output 125 ml   Net 398.33 ml      Physical Exam  Vitals signs and nursing note reviewed.   Constitutional:       Appearance: She is well-developed. She is ill-appearing.   HENT:      Head: Normocephalic and atraumatic.      Mouth/Throat:      Mouth: Mucous membranes are dry.   Eyes:      Conjunctiva/sclera: Conjunctivae normal.      Pupils: Pupils are equal, round, and reactive to light.   Neck:      Musculoskeletal: Normal range of motion and neck supple.   Cardiovascular:      Rate and Rhythm: Regular rhythm. Tachycardia present.      Pulses: Normal pulses.   Pulmonary:      Effort: Pulmonary effort is normal. No respiratory distress.      Breath sounds: Normal breath sounds.   Abdominal:      General: Bowel sounds are normal. There is no distension.      Palpations: Abdomen is soft.      Tenderness: There is no abdominal tenderness.   Genitourinary:     Comments: Deferred    Musculoskeletal:         General: Swelling (right knee) and tenderness (right knee) present.   Skin:     General: Skin is warm and dry.      Capillary Refill: Capillary refill takes 2 to 3 seconds.      Coloration: Skin is pale.      Findings: No rash.   Neurological:      Mental Status: She is lethargic.      GCS: GCS eye subscore is 3. GCS verbal subscore is  4. GCS motor subscore is 6.   Psychiatric:         Mood and Affect: Affect is flat.         Behavior: Behavior normal.         Cognition and Memory: Cognition is impaired.         Significant Labs:   CBC:   Recent Labs   Lab 10/21/20  1920 10/22/20  0754   WBC 19.19* 14.61*   HGB 12.1 11.2*   HCT 37.8 37.7    132*     CMP:   Recent Labs   Lab 10/21/20  1920 10/22/20  0754    139   K 5.7* 6.0*    107   CO2 16* 17*   * 144*   BUN 53* 62*   CREATININE 2.3* 2.6*   CALCIUM 9.6 9.0   PROT 6.5 6.1   ALBUMIN 3.1* 2.7*   BILITOT 0.4 0.4   ALKPHOS 75 71   * 179*   ALT 74* 74*   ANIONGAP 18* 15   EGFRNONAA 18* 15*     Lipid Panel:   Recent Labs   Lab 10/21/20  1920   CHOL 136   HDL 41   LDLCALC 76.4   TRIG 93   CHOLHDL 30.1     Urine Studies:   Recent Labs   Lab 10/21/20  2117   COLORU Yellow   APPEARANCEUA Clear   PHUR 7.0   SPECGRAV 1.015   PROTEINUA Trace*   GLUCUA Negative   KETONESU Negative   BILIRUBINUA Negative   OCCULTUA 1+*   NITRITE Negative   UROBILINOGEN Negative   LEUKOCYTESUR Trace*   RBCUA 3   WBCUA 4     CPK> 7 K    Significant Imaging:   Imaging Results          CT Knee Without Contrast Right (Final result)  Result time 10/21/20 23:29:16    Final result by Jian aRphael MD (10/21/20 23:29:16)                 Impression:      1. Severely comminuted, impacted, angulated and displaced fracture of the distal femur, as described.  CT imaging better demonstrates extension of the fracture to the intercondylar notch.  2. No additional acute fractures identified.  3. Additional details, as per report body.      Electronically signed by: Jian Raphael  Date:    10/21/2020  Time:    23:29             Narrative:    EXAMINATION:  CT KNEE WITHOUT CONTRAST RIGHT    CLINICAL HISTORY:  distal femur fx;    TECHNIQUE:  Unenhanced CT imaging of the knee was performed with multiplanar reformats and bone and standard algorithm.    COMPARISON:  Radiographs of the right knee and femur performed  10/21/2020    FINDINGS:  Again seen is severely comminuted, impacted, and angulated fracture distal femoral diaphysis and metaphysis.  There is approximately 46 degrees of anterior angulation of the distal fracture fragment.  There is approximately 1.1 cm lateral and 1 cm anterior displacement of distal fracture fragment.  At least 1.2 cm of override of the impacted proximal distal fracture fragments.    Current CT examination better demonstrates fracture extension to the intercondylar notch.  No definite extension to the tibiofemoral joint space.  No definite acute fractures of the visualized proximal tibia or fibula.  No fracture of the patella identified.    Severe osteoarthritic changes of the medial and lateral joint space are noted.  Moderate joint space narrowing and osteophyte formation of the patellofemoral compartment.    Posttraumatic lipohemarthrosis is noted.  Posttraumatic findings are noted in the soft tissues about the knee joint and proximal tibia.                                X-Ray Femur Ap/Lat Right (Final result)  Result time 10/21/20 22:18:28    Final result by Jian Raphael MD (10/21/20 22:18:28)                 Impression:      Acute comminuted and displaced fracture of the distal femoral metaphysis without definite intra-articular extension, as described.    This report was flagged in Epic as abnormal.      Electronically signed by: Jian Raphael  Date:    10/21/2020  Time:    22:18             Narrative:    EXAMINATION:  XR KNEE 3 VIEW RIGHT; XR FEMUR 2 VIEW RIGHT    CLINICAL HISTORY:  fall; Unspecified fall, initial encounter    TECHNIQUE:  AP, lateral, and Merchant views of the right knee were performed.    COMPARISON:  Right knee radiographs performed 10/24/2017 and 05/24/2018    FINDINGS:  Acute comminuted and displaced fracture of the distal femoral metaphysis.  There is approximately 1.3 cm of lateral displacement of the proximal fracture fragment.  There is approximately 27  degrees of anterior angulation of the proximal fracture fragment.  No definite extension to the articular surface.    No definite additional acute fractures are seen.  Osseous demineralization is identified.  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  Vascular calcifications.  The joint effusion is identified.  Soft tissue swelling about the knee joint.                                X-Ray Knee 3 View Right (Final result)  Result time 10/21/20 22:18:28   Procedure changed from X-Ray Knee 1 or 2 View Right     Final result by Jian Raphael MD (10/21/20 22:18:28)                 Impression:      Acute comminuted and displaced fracture of the distal femoral metaphysis without definite intra-articular extension, as described.    This report was flagged in Epic as abnormal.      Electronically signed by: Jian Raphael  Date:    10/21/2020  Time:    22:18             Narrative:    EXAMINATION:  XR KNEE 3 VIEW RIGHT; XR FEMUR 2 VIEW RIGHT    CLINICAL HISTORY:  fall; Unspecified fall, initial encounter    TECHNIQUE:  AP, lateral, and Merchant views of the right knee were performed.    COMPARISON:  Right knee radiographs performed 10/24/2017 and 05/24/2018    FINDINGS:  Acute comminuted and displaced fracture of the distal femoral metaphysis.  There is approximately 1.3 cm of lateral displacement of the proximal fracture fragment.  There is approximately 27 degrees of anterior angulation of the proximal fracture fragment.  No definite extension to the articular surface.    No definite additional acute fractures are seen.  Osseous demineralization is identified.  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  Vascular calcifications.  The joint effusion is identified.  Soft tissue swelling about the knee joint.                               CT Chest Abdomen Pelvis Without Contrast (XPD) (Final result)  Result time 10/21/20 21:45:57    Final result by Jian Raphael MD (10/21/20 21:45:57)                  Impression:      1. No acute traumatic findings in the chest, abdomen, or pelvis.  2. Prominent stool within distended distal colon and rectum suggestive of fecal impaction.  Minimal surrounding inflammatory change raise the possibility of stercoral colitis/proctitis.  3. Indeterminate left renal lesions, as noted on prior CT imaging of 06/08/2018, for which dedicated renal ultrasound would be recommended, this is not been previously performed.  4. Additional incidental and chronic findings, as per report body.      Electronically signed by: Jian Raphael  Date:    10/21/2020  Time:    21:45             Narrative:    EXAMINATION:  CT CHEST ABDOMEN PELVIS WITHOUT CONTRAST(XPD)    CLINICAL HISTORY:  Chest-abdomen-pelvis trauma, blunt;fall,;    TECHNIQUE:  Low dose axial images, sagittal and coronal reformations were obtained from the thoracic inlet to the pubic symphysis    COMPARISON:  CT abdomen and pelvis performed 06/18/2018.  CT chest performed 03/02/2017.    FINDINGS:  This examination is limited due to lack of intravenous contrast.    CHEST    Support tubes and lines: None.    Aorta: Not dilated.  Atherosclerotic calcification.    Heart: Mildly enlarged.    Coronary arteries: Coronary artery and valvular calcifications are noted.    Pericardium: Normal. No effusion, thickening, or calcification.    Central pulmonary arteries: Normal caliber.    Base of neck/thyroid: Unremarkable.    Lymph nodes: No supraclavicular, axillary, internal mammary, mediastinal, or hilar adenopathy.    Esophagus: Patulous and fluid-filled esophagus.    Pleura: Calcified pleural plaque is noted at the left lung apex.  No pneumothorax or pleural effusion.    Body wall: Unremarkable.    Airways: Unremarkable.    Lungs: Evaluation of the lung parenchyma is somewhat limited due to motion.  Additionally, comparison to prior dedicated chest CT of 03/02/2017 is limited due to differences in technique/slice thickness.  Again noted are  peripheral and lower lobe predominant reticulation and bronchial wall thickening.  No evidence of honeycombing is noted.  Some degree of traction bronchiectasis is suggested at the lung apices associated with pleuroparenchymal scarring.  Elsewhere, there are areas of nonspecific mosaic attenuation, which may be on the basis of small vessel and/or small airways disease.  Overall pattern of findings is felt relatively similar to the prior study of 03/02/2017.    Bones: Osseous mineralization.  Multilevel mild height loss of several thoracic vertebral bodies without definite change since 03/02/2017 CT.    ABDOMEN/PELVIS    Liver: Normal contour.    Gallbladder and bile ducts: Unremarkable.    Pancreas: Normal contour.    Spleen: Normal contour.    Adrenals: Normal contour.    Kidneys: 2 hyperattenuating foci are noted in the right kidney, the larger of which is located in the midpole and measures approximately 16 mm (series 2, image 131 in the smaller which is located in the inferior pole measuring approximately 9 mm.  Simple cysts is noted in the superior to mid pole.  No concerning lesions are identified in the right kidney.    Lymph nodes: No abdominal or pelvic lymphadenopathy.    Bowel and mesentery: Small hiatal hernia.  Pronounced amount of stool is noted within the rectum and distal colon.  No definite wall thickening of the involved bowel.  There may be minimal surrounding inflammatory change in the surrounding fat visualized small bowel colon are otherwise unremarkable.    Abdominal aorta: Heavy atherosclerotic calcification of the aorta and iliac arteries.    Inferior vena cava: Unremarkable.    Free fluid or free air: None.    Pelvis: The uterus appears to be surgically absent.    Urinary bladder: Unremarkable.    Body wall: Unremarkable.    Bones: Chronic deformity of the left proximal femur with suggestion of prior internal fixation.  No metallic hardware is identified on the current study.  Additionally,  there is evidence of a chronic right superior and inferior pubic ramus fracture.  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  No definite acute thoracic or lumbar spine fracture is appreciated.                               CT Cervical Spine Without Contrast (Final result)  Result time 10/21/20 21:25:51    Final result by Jian Raphael MD (10/21/20 21:25:51)                 Impression:      1. No acute cervical spine fracture.  2. Degenerative findings in the cervical spine without critical spinal canal or foraminal narrowing      Electronically signed by: Jian Raphael  Date:    10/21/2020  Time:    21:25             Narrative:    EXAMINATION:  CT CERVICAL SPINE WITHOUT CONTRAST    CLINICAL HISTORY:  Neck trauma (Age => 65y);    TECHNIQUE:  Low dose axial images, sagittal and coronal reformations were performed though the cervical spine.  Contrast was not administered.    COMPARISON:  None    FINDINGS:  Fractures: No acute fractures    Alignment: No definite acute traumatic subluxation.  There is grade 1 anterolisthesis of C4 on C5 and C5 on C6.  Atlanto-axial and atlanto-occipital joints: Atlanto-axial and atlanto-occipital intervals are not widened.  Facet joints: There is no traumatic facet joint widening.  Degenerative facet arthropathy.  Vertebral bodies: Osseous demineralization.  Degenerative plate change most advanced spanning the C5-6 through C7-T1 levels.  Discs: Degenerate disc osteophyte complexes.  Spinal canal and foraminal narrowing: Although CT does not optimally evaluate the soft tissue contents of the spinal canal and foramina, no critical stenosis is suggested.  Paraspinal soft tissues: Soft tissue atherosclerotic vascular calcifications are noted.    Upper Lungs:Nonspecific debris is noted within a patulous esophagus.  Fibrotic changes are noted in the lung apices.  There is bronchiectasis and suggestion of calcified pleural plaque on the left.                               CT  Head Without Contrast (Final result)  Result time 10/21/20 19:13:37    Final result by Anuj Bob Jr., MD (10/21/20 19:13:37)                 Impression:      Moderate brain atrophy with deep white matter ischemic changes.  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  Acute CVA or hemorrhage is not identified on this study.      Electronically signed by: Anuj Bob MD  Date:    10/21/2020  Time:    19:13             Narrative:    EXAMINATION:  CT HEAD WITHOUT CONTRAST    CLINICAL HISTORY:  Neuro deficit, acute, stroke suspected;    TECHNIQUE:  Low dose axial images were obtained through the head.  Coronal and sagittal reformations were also performed. Contrast was not administered.    COMPARISON:  MRI brain of February 23, 2015.    FINDINGS:  No cranial fracture is identified.  Scalp edema or hematoma is not noted.    The basal cisterns are clear.  There is no mass effect or midline shift.  There is moderate enlargement of the lateral ventricles and sylvian fissures consistent with brain atrophy.  Hypodensity in the central white matter of the cerebrum bilaterally is consistent with deep white matter ischemic changes.  There is a 7 mm near CSF focal density of the right internal capsule consistent with an old lacunar infarct.    Focal area of increased or decreased CT density consistent with tumor, edema, acute CVA or hemorrhage is not seen.  No intracranial hemorrhage or hematoma is noted.                                    Assessment/Plan:      * Stroke  Follow Neurology recommendations.  MRI results reviewed with patient's family.  Neuro checks q4h  Permissive HTN <220/<120 - will hold home antihypertensives and restart when clinically appropriate  Follow Carotid U/S and ECHO  Check fasting lipid profile.  Continue chronic ASA  DVT prophylaxis with DUY's, SCD's.  Consider Lovenox if ok with neurology  Aspiration precautions, fall precautions        Goals of care,  counseling/discussion  Advance Care Planning     Living Will  During this visit, I engaged the daughter in law (POA) - Caron Dupont  in the advance care planning process.  The patient and I reviewed the role for advance directives and their purpose in directing future healthcare if the patient's unable to speak for him/herself.  At this point in time, the patient does have full decision-making capacity.  We discussed different extreme health states that she could experience, and reviewed what kind of medical care she would want in those situations.  The daughter in law (POA) communicated that if she were comatose and had little chance of a meaningful recovery, she would not want machines/life-sustaining treatments used. I spent a total of 20 minutes engaging the patient in this advance care planning discussion - 10/22/2020              Other fracture of right femur, initial encounter for closed fracture  Consult orthopedic surgeon-Spoke with Dr. Curtis.  Recommends long leg posterior splint with stirrups if possible; otherwise, knee immobilizer.  CT right knee obtained per his request.  Lovenox OK from his perspective-will defer decision to neurology.  He does not expect immediate surgical intervention given pt's hx stroke.  Will consult PT/OT when appropriate.  Pain control acutely-will utilize IV tylenol with caution given transaminitis.  Pt's daughter in law reports that pt hallucinates with narcotics.       Acute kidney injury superimposed on chronic kidney disease  Likely 2/2 IVVD. Gentle IVF hydration.  Follow renal panel and electrolytes closely.  Adjust renal dose medications for Estimated Creatinine Clearance: 12.2 mL/min (A) (based on SCr of 2.6 mg/dL (H)).  Avoid NSAIDs, Castro-II inhibitors, ACE-I, Angiotensin Receptor Blockers, or Aminoglycosides.  Urine analysis.                  RLS (restless legs syndrome)  Chronic; continue mirapex.      Rheumatoid arthritis involving multiple sites with positive  rheumatoid factor, onset 1972  History noted.      Gastroesophageal reflux disease without esophagitis  Chronic; will utilize pepcid acutely.      Mixed hyperlipidemia, baseline LDL not available  Chronic; continue statin.      HTN (hypertension), onset 2014  Chronic; stable.  Hold chronic antihypertensives for now to allow for permissive HTN.  Resume chronic medications when clinically appropriate.     I discussed with patient's daughter-in-law miss Caron Dupont.  Answered all questions.  She is aware of patient's multiple medical condition and poor prognosis due to advanced age.  She is not interested to put patient under orthopedic surgery unless she really needs.  Will wait for per recommendations from Dr. Curtis.  She confirm DNR code status.    VTE Risk Mitigation (From admission, onward)         Ordered     IP VTE HIGH RISK PATIENT  Once .  Avoiding anticoagulation therapy at this time due to possibility of hemorrhagic conversion of CVA.      10/22/20 0043     Place sequential compression device  Until discontinued      10/22/20 0043     Reason for No Pharmacological VTE Prophylaxis  Once     Question:  Reasons:  Answer:  Physician Provided (leave comment)    10/22/20 0043                Discharge Planning   AYDE:      Code Status: DNR   Is the patient medically ready for discharge?:     Reason for patient still in hospital (select all that apply): Patient trending condition  Discharge Plan A: Skilled Nursing Facility        Natalia Rm MD  Department of Hospital Medicine   [facility]

## 2020-10-22 NOTE — CARE UPDATE
10/22/20 0745   PRE-TX-O2   O2 Device (Oxygen Therapy) room air   SpO2 95 %   Pulse Oximetry Type Intermittent   $ Pulse Oximetry - Multiple Charge Pulse Oximetry - Multiple   Pulse 94   Resp 18

## 2020-10-22 NOTE — CONSULTS
"  [facility]  Adult Nutrition  Consult Note    SUMMARY    Intervention: collaboration with care providers     Recommendation:  1) Advance PO diet to regular, texture per SLP- no salt packets   -renal if K and phos remain elevated  2) Add boost Plus BID- chocolate or strawberry   -or novasource if renal function does not improve    3) If unable to advance PO diet in < 4 days rec. PEG placement and initiate TF:   Isosource 1.5 @ 20 ml/hr advancing to goal of 40 ml/hr + 130 ml flush q 4 hr   ( provides 1440 kcal ( 99% EEN), 65 g protein ( 100% EPN), and 729 ml free water)     4) weigh pt weekly    Goals: 1) PO diet advanced or nutrition support initiated in < 4 days  Nutrition Goal Status: new  Communication of RD Recs: (POC, sticky note)    Reason for Assessment    Reason For Assessment: consult  Diagnosis: (stroke)  Relevant Medical History: HTN, HLD, TIA, anemia, GERD, CKD  Interdisciplinary Rounds: attended    General Information Comments: 89 y/o female admitted with stroke, femur fracture, and JAS on CKD. Pt lethargic x 2 days, not answering questions at time of visit. SLP rec. pt remain NPO for now. Spoke with pt's son and he say pt lives shaye but children come in the morning, make breakfast and leave food out ( like bananas/ snack foods) for the middle of the day and then come back and make dinner. Recently pt has been sleeping most of the day and they just had to switch to baby food and start spon feeding her. She drinks 2 Ensure daily when family is around. NFPE done 10/22/20 mild wasting seen. Wt gain per chart review. Educated son on heart healthy diet, left information for other children.    Nutrition Discharge Planning: to be determined- Cardiac diet, texture per SLP+ boost Plus BID- yael. or strawberry    Nutrition Risk Screen    Nutrition Risk Screen: no indicators present    Nutrition/Diet History    Patient Reported Diet/Restrictions/Preferences: general  Food Preferences: " very " "picky" per son spits food out when she doesn't like it  Spiritual, Cultural Beliefs, Sabianism Practices, Values that Affect Care: no  Food Allergies: NKFA  Factors Affecting Nutritional Intake: NPO, difficulty/impaired swallowing, impaired cognitive status/motor control    Anthropometrics    Temp: 96.6 °F (35.9 °C)  Height Method: Measured(office 20)  Height: 5' 5"  Height (inches): 65 in  Weight Method: Bed Scale  Weight: 57.8 kg (127 lb 6.8 oz)  Weight (lb): 127.43 lb  Ideal Body Weight (IBW), Female: 125 lb  % Ideal Body Weight, Female (lb): 101.94 %  BMI (Calculated): 21.2  BMI Grade: 18.5-24.9 - normal(underweight for age)  Usual Body Weight (UBW), k.1 kg(20)  % Usual Body Weight: 109.08  % Weight Change From Usual Weight: 8.85 %       Lab/Procedures/Meds    Pertinent Labs Reviewed: reviewed  BMP  Lab Results   Component Value Date     10/22/2020    K 6.0 (H) 10/22/2020     10/22/2020    CO2 17 (L) 10/22/2020    BUN 62 (H) 10/22/2020    CREATININE 2.6 (H) 10/22/2020    CALCIUM 9.0 10/22/2020    ANIONGAP 15 10/22/2020    ESTGFRAFRICA 18 (A) 10/22/2020    EGFRNONAA 15 (A) 10/22/2020     Lab Results   Component Value Date    CALCIUM 9.0 10/22/2020    PHOS 4.6 (H) 10/22/2020     Lab Results   Component Value Date    ALBUMIN 2.7 (L) 10/22/2020       Pertinent Medications Reviewed: reviewed  Pertinent Medications Comments: statin, B12, zofran, NS @ 100 ml/hr    Estimated/Assessed Needs    Weight Used For Calorie Calculations: 57.8 kg (127 lb 6.8 oz)  Energy Calorie Requirements (kcal): 25-30 kcal/kg ( JAS vs. wt gain) = 7447-6805 kcal  Energy Need Method: [facility]  Protein Requirements: 1.0-1.2 g protein/kg ( JAS vs. wasting/age) = 57-69 g protein  Weight Used For Protein Calculations: 57.8 kg (127 lb 6.8 oz)  Fluid Requirements (mL): 1500 ml or per MD  Estimated Fluid Requirement Method: RDA Method  CHO Requirement: N/A      Nutrition Prescription Ordered    Current Diet Order: NPO x " 1 day    Evaluation of Received Nutrient/Fluid Intake    Energy Calories Required: not meeting needs  Protein Required: not meeting needs  Fluid Required: exceeds needs  Total Fluid Intake (mL/kg): NS @ 100 ml/hr  Tolerance: other (see comments)(NPO)  % Intake of Estimated Energy Needs: 0%  % Meal Intake: 0%    Nutrition Risk    Level of Risk/Frequency of Follow-up: moderate 2 x weekly    Assessment and Plan    Moderate chronic condition related malnutrition  R/t decreased appetite and ability to feed self  As evidenced by   1) PO intakes < 75% EEN x > 1 month  2) mild muscle/fat wasting as charted below  Intervention: above  New    Monitor and Evaluation    Food and Nutrient Intake: energy intake, food and beverage intake  Food and Nutrient Adminstration: diet order  Anthropometric Measurements: weight  Biochemical Data, Medical Tests and Procedures: electrolyte and renal panel     Malnutrition Assessment     Skin (Micronutrient): (Hon[place] = 14)  Teeth (Micronutrient): (Missing some)   Micronutrient Evaluation: suspected deficiency, suspected toxicity  Micronutrient Evaluation Comments: Vitamin D/ K toxicity   Energy Intake (Malnutrition): less than 75% for greater than or equal to 1 month   Orbital Region (Subcutaneous Fat Loss): mild depletion  Upper Arm Region (Subcutaneous Fat Loss): mild depletion  Sabianism Region (Muscle Loss): mild depletion  Scapular Bone Region (Muscle Loss): mild depletion  Dorsal Hand (Muscle Loss): moderate depletion  Patellar Region (Muscle Loss): well nourished  Anterior Thigh Region (Muscle Loss): well nourished  Posterior Calf Region (Muscle Loss): well nourished       Subcutaneous Fat Loss (Final Summary): mild protein-calorie malnutrition  Muscle Loss Evaluation (Final Summary): mild protein-calorie malnutrition         Nutrition Follow-Up    RD Follow-up?: Yes

## 2020-10-22 NOTE — H&P
[facility] Hospital Medicine  History & Physical    Patient Name: Melody Dupont  MRN: [id number]  Admission Date: 10/21/2020  Attending Physician: Natalia Rm MD   Primary Care Provider: Larisa Wells MD         Patient information was obtained from relative(s), past medical records and ER records.     Subjective:     Principal Problem:Stroke    Chief Complaint:   Chief Complaint   Patient presents with    Extremity Weakness     left arm weakness.  right side gaze.        HPI: Melody Dupont is a 89 y/o female with a past medical history significant for HTN, HLD, TIA, anemia, RA, RLS, and GERD who presented to the ED with c/o altered mental status.  Pt is accompanied by her daughter in law who provides the history.  Per pt's daughter in law, pt lives alone.  Her daughter in law visits her 3 times daily to assist her with ADL's.  Pt was noted to be in her normal state of health this afternoon around 1 pm.  This evening when pt's daughter in law went back to check on her and help her get ready for bed, she found the patient to be lethargic and vomiting.  Pt was unable to move her left side.  She further relays that pt had an unwitnessed fall yesterday and her right knee is much more swollen than normal.   Pt was assessed for acute stroke while in the ED and determined not to be a candidate for tPA.  Labs are significant for transaminitis, hyperkalemia of 5.7 and JAS with creatinine of 2.3.  CT head was negative for anything acute.  CT C-spine was negative for fracture.  CT chest/abd/pelvis was significant for fecal impaction.  An xray of the patient's right knee was obtained and she is found to have an acute distal femur fracture.  The case was discussed with Dr. Curtis, orthopedic surgeon, and further recommendations were noted.  The patient will be admitted to the service of hospital medicine for continued treatment.           Past Medical History:   Diagnosis Date    Anemia      Cataract     Colitis     Disorder of kidney and ureter     Gastric ulcer     Hyperlipidemia     Hypertension     Malnutrition     Osteoporosis     PUD (peptic ulcer disease)     Restless leg syndrome     Rheumatoid arthritis     Stroke 10/21/2020    TIA (transient ischemic attack) 1/23/12       Past Surgical History:   Procedure Laterality Date    ADENOIDECTOMY      FRACTURE SURGERY      left hip ORIF    HAND SURGERY      HYSTERECTOMY      complete    TONSILLECTOMY         Review of patient's allergies indicates:   Allergen Reactions    Antihistamines - alkylamine Other (See Comments)     Other reaction(s): Hallucinations    Hydrocodone Other (See Comments)     Confusion, isolated, abandon    Ibuprofen      Gi bleed, ckd    Plavix [clopidogrel] Hives    Prednisone Hallucinations     Other reaction(s): Hallucinations    Tramadol     Triamcinolone Other (See Comments)     hallucinations    Penicillins Rash     Other reaction(s): Unknown       No current facility-administered medications on file prior to encounter.      Current Outpatient Medications on File Prior to Encounter   Medication Sig    aspirin (ECOTRIN) 81 MG EC tablet Take 162 mg by mouth once daily. 2 at night    cyanocobalamin (VITAMIN B-12) 1000 MCG tablet Take 100 mcg by mouth once daily.    docusate sodium (COLACE) 100 MG capsule Take 100 mg by mouth 2 (two) times daily.    fluticasone propionate (FLONASE) 50 mcg/actuation nasal spray 1 spray (50 mcg total) by Each Nostril route once daily.    hydrOXYzine HCL (ATARAX) 10 MG Tab Take 1 tablet (10 mg total) by mouth nightly as needed.    lisinopriL (PRINIVIL,ZESTRIL) 20 MG tablet Take 1 tablet (20 mg total) by mouth once daily.    pantoprazole (PROTONIX) 40 MG tablet Take 1 tablet (40 mg total) by mouth once daily.    pramipexole (MIRAPEX) 0.125 MG tablet Take 1 tablet (0.125 mg total) by mouth nightly as needed (rls).    pravastatin (PRAVACHOL) 10 MG tablet Take 1  tablet (10 mg total) by mouth once daily.    triamcinolone acetonide 0.1% (KENALOG) 0.1 % cream Apply topically 2 (two) times daily.     Family History     Problem Relation (Age of Onset)    Alcohol abuse Brother, Brother    Cancer Brother, Brother    Heart disease Mother, Father    No Known Problems Paternal Grandmother, Paternal Grandfather, Son    Parkinsonism Brother        Tobacco Use    Smoking status: Never Smoker    Smokeless tobacco: Never Used   Substance and Sexual Activity    Alcohol use: No    Drug use: No    Sexual activity: Never     Review of Systems   Unable to perform ROS: Mental status change     Objective:     Vital Signs (Most Recent):  Temp: 97.3 °F (36.3 °C) (10/22/20 0036)  Pulse: 107 (10/22/20 0036)  Resp: 17 (10/22/20 0036)  BP: (!) 126/59 (10/22/20 0036)  SpO2: 98 % (10/22/20 0036) Vital Signs (24h Range):  Temp:  [97.2 °F (36.2 °C)-97.3 °F (36.3 °C)] 97.3 °F (36.3 °C)  Pulse:  [] 107  Resp:  [17-20] 17  SpO2:  [80 %-100 %] 98 %  BP: (101-144)/(53-77) 126/59     Weight: 57.8 kg (127 lb 6.8 oz)  Body mass index is 21.2 kg/m².    Physical Exam  Vitals signs and nursing note reviewed.   Constitutional:       Appearance: She is well-developed. She is ill-appearing.   HENT:      Head: Normocephalic and atraumatic.      Mouth/Throat:      Mouth: Mucous membranes are dry.   Eyes:      Conjunctiva/sclera: Conjunctivae normal.      Pupils: Pupils are equal, round, and reactive to light.   Neck:      Musculoskeletal: Normal range of motion and neck supple.   Cardiovascular:      Rate and Rhythm: Regular rhythm. Tachycardia present.      Pulses: Normal pulses.   Pulmonary:      Effort: Pulmonary effort is normal. No respiratory distress.      Breath sounds: Normal breath sounds.   Abdominal:      General: Bowel sounds are normal. There is no distension.      Palpations: Abdomen is soft.      Tenderness: There is no abdominal tenderness.   Genitourinary:     Comments:  Deferred    Musculoskeletal:         General: Swelling (right knee) and tenderness (right knee) present.   Skin:     General: Skin is warm and dry.      Capillary Refill: Capillary refill takes 2 to 3 seconds.      Coloration: Skin is pale.      Findings: No rash.   Neurological:      Mental Status: She is lethargic.      GCS: GCS eye subscore is 3. GCS verbal subscore is 4. GCS motor subscore is 6.   Psychiatric:         Mood and Affect: Affect is flat.         Behavior: Behavior normal.         Cognition and Memory: Cognition is impaired.           CRANIAL NERVES     CN III, IV, VI   Pupils are equal, round, and reactive to light.       Significant Labs:   CBC:   Recent Labs   Lab 10/21/20  1920   WBC 19.19*   HGB 12.1   HCT 37.8        CMP:   Recent Labs   Lab 10/21/20  1920      K 5.7*      CO2 16*   *   BUN 53*   CREATININE 2.3*   CALCIUM 9.6   PROT 6.5   ALBUMIN 3.1*   BILITOT 0.4   ALKPHOS 75   *   ALT 74*   ANIONGAP 18*   EGFRNONAA 18*     Lipid Panel:   Recent Labs   Lab 10/21/20  1920   CHOL 136   HDL 41   LDLCALC 76.4   TRIG 93   CHOLHDL 30.1     TSH:   Recent Labs   Lab 10/21/20  1920   TSH 5.429*     Urine Studies:   Recent Labs   Lab 10/21/20  2117   COLORU Yellow   APPEARANCEUA Clear   PHUR 7.0   SPECGRAV 1.015   PROTEINUA Trace*   GLUCUA Negative   KETONESU Negative   BILIRUBINUA Negative   OCCULTUA 1+*   NITRITE Negative   UROBILINOGEN Negative   LEUKOCYTESUR Trace*   RBCUA 3   WBCUA 4       Significant Imaging: CT head: Moderate brain atrophy with deep white matter ischemic changes.  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  Acute CVA or hemorrhage is not identified on this study.    CT cervical spine:1. No acute cervical spine fracture.  2. Degenerative findings in the cervical spine without critical spinal canal or foraminal narrowing    CT chest/abd/pelvis:1. No acute traumatic findings in the chest, abdomen, or pelvis.  2. Prominent stool within  distended distal colon and rectum suggestive of fecal impaction.  Minimal surrounding inflammatory change raise the possibility of stercoral colitis/proctitis.  3. Indeterminate left renal lesions, as noted on prior CT imaging of 06/08/2018, for which dedicated renal ultrasound would be recommended, this is not been previously performed.  4. Additional incidental and chronic findings, as per report body    Xray R knee:Acute comminuted and displaced fracture of the distal femoral metaphysis without definite intra-articular extension, as described.    CT right knee:1. Severely comminuted, impacted, angulated and displaced fracture of the distal femur, as described.  CT imaging better demonstrates extension of the fracture to the intercondylar notch.  2. No additional acute fractures identified.  3. Additional details, as per report body.          Assessment/Plan:     * Stroke  Consult Neurology  Neuro checks q4h  Permissive HTN <220/<120 - will hold home antihypertensives and restart when clinically appropriate  MRI of brain  Carotid U/S  ECHO  Trend CBC, CMP, Mg and Phos  Fasting lipid panel  HgA1C  Consult PT/OT  Continue chronic ASA  DVT prophylaxis with DUY's, SCD's.  Consider Lovenox if ok with neurology  Aspiration precautions, fall precautions        Acute kidney injury superimposed on chronic kidney disease  Likely 2/2 IVVD  Gentle IVF hydration.  Follow renal panel and electrolytes closely.  Adjust renal dose medications for Estimated Creatinine Clearance: 13.8 mL/min (A) (based on SCr of 2.3 mg/dL (H)).  Avoid NSAIDs, Castro-II inhibitors, ACE-I, Angiotensin Receptor Blockers, or Aminoglycosides.  Urine analysis.  Check  Urine Na, Cr, Protein.      Other fracture of right femur, initial encounter for closed fracture  Consult orthopedic surgeon-Spoke with Dr. Curtis.  Recommends long leg posterior splint with stirrups if possible; otherwise, knee immobilizer.  CT right knee obtained per his request.  Lovenox OK  from his perspective-will defer decision to neurology.  He does not expect immediate surgical intervention given pt's hx stroke.  Will consult PT/OT when appropriate.  Pain control acutely-will utilize IV tylenol with caution given transaminitis.  Pt's daughter in law reports that pt hallucinates with narcotics.       RLS (restless legs syndrome)  Chronic; continue mirapex.      Rheumatoid arthritis involving multiple sites with positive rheumatoid factor, onset 1972  History noted.      Gastroesophageal reflux disease without esophagitis  Chronic; will utilize pepcid acutely.      Mixed hyperlipidemia, baseline LDL not available  Chronic; continue statin.      HTN (hypertension), onset 2014  Chronic; stable.  Hold chronic antihypertensives for now to allow for permissive HTN.  Resume chronic medications when clinically appropriate.        VTE Risk Mitigation (From admission, onward)         Ordered     IP VTE HIGH RISK PATIENT  Once      10/22/20 0043     Place sequential compression device  Until discontinued      10/22/20 0043     Reason for No Pharmacological VTE Prophylaxis  Once     Question:  Reasons:  Answer:  Physician Provided (leave comment)    10/22/20 0043                   REBECCA More  Department of Hospital Medicine   [facility]

## 2020-10-22 NOTE — SUBJECTIVE & OBJECTIVE
Past Medical History:   Diagnosis Date    Anemia     Cataract     Colitis     Disorder of kidney and ureter     Gastric ulcer     Hyperlipidemia     Hypertension     Malnutrition     Osteoporosis     PUD (peptic ulcer disease)     Restless leg syndrome     Rheumatoid arthritis     Stroke 10/21/2020    TIA (transient ischemic attack) 1/23/12       Past Surgical History:   Procedure Laterality Date    ADENOIDECTOMY      FRACTURE SURGERY      left hip ORIF    HAND SURGERY      HYSTERECTOMY      complete    TONSILLECTOMY         Review of patient's allergies indicates:   Allergen Reactions    Antihistamines - alkylamine Other (See Comments)     Other reaction(s): Hallucinations    Hydrocodone Other (See Comments)     Confusion, isolated, abandon    Ibuprofen      Gi bleed, ckd    Plavix [clopidogrel] Hives    Prednisone Hallucinations     Other reaction(s): Hallucinations    Tramadol     Triamcinolone Other (See Comments)     hallucinations    Penicillins Rash     Other reaction(s): Unknown       No current facility-administered medications on file prior to encounter.      Current Outpatient Medications on File Prior to Encounter   Medication Sig    aspirin (ECOTRIN) 81 MG EC tablet Take 162 mg by mouth once daily. 2 at night    cyanocobalamin (VITAMIN B-12) 1000 MCG tablet Take 100 mcg by mouth once daily.    docusate sodium (COLACE) 100 MG capsule Take 100 mg by mouth 2 (two) times daily.    fluticasone propionate (FLONASE) 50 mcg/actuation nasal spray 1 spray (50 mcg total) by Each Nostril route once daily.    hydrOXYzine HCL (ATARAX) 10 MG Tab Take 1 tablet (10 mg total) by mouth nightly as needed.    lisinopriL (PRINIVIL,ZESTRIL) 20 MG tablet Take 1 tablet (20 mg total) by mouth once daily.    pantoprazole (PROTONIX) 40 MG tablet Take 1 tablet (40 mg total) by mouth once daily.    pramipexole (MIRAPEX) 0.125 MG tablet Take 1 tablet (0.125 mg total) by mouth nightly as needed  (rls).    pravastatin (PRAVACHOL) 10 MG tablet Take 1 tablet (10 mg total) by mouth once daily.    triamcinolone acetonide 0.1% (KENALOG) 0.1 % cream Apply topically 2 (two) times daily.     Family History     Problem Relation (Age of Onset)    Alcohol abuse Brother, Brother    Cancer Brother, Brother    Heart disease Mother, Father    No Known Problems Paternal Grandmother, Paternal Grandfather, Son    Parkinsonism Brother        Tobacco Use    Smoking status: Never Smoker    Smokeless tobacco: Never Used   Substance and Sexual Activity    Alcohol use: No    Drug use: No    Sexual activity: Never     Review of Systems   Unable to perform ROS: Mental status change     Objective:     Vital Signs (Most Recent):  Temp: 97.3 °F (36.3 °C) (10/22/20 0036)  Pulse: 107 (10/22/20 0036)  Resp: 17 (10/22/20 0036)  BP: (!) 126/59 (10/22/20 0036)  SpO2: 98 % (10/22/20 0036) Vital Signs (24h Range):  Temp:  [97.2 °F (36.2 °C)-97.3 °F (36.3 °C)] 97.3 °F (36.3 °C)  Pulse:  [] 107  Resp:  [17-20] 17  SpO2:  [80 %-100 %] 98 %  BP: (101-144)/(53-77) 126/59     Weight: 57.8 kg (127 lb 6.8 oz)  Body mass index is 21.2 kg/m².    Physical Exam  Vitals signs and nursing note reviewed.   Constitutional:       Appearance: She is well-developed. She is ill-appearing.   HENT:      Head: Normocephalic and atraumatic.      Mouth/Throat:      Mouth: Mucous membranes are dry.   Eyes:      Conjunctiva/sclera: Conjunctivae normal.      Pupils: Pupils are equal, round, and reactive to light.   Neck:      Musculoskeletal: Normal range of motion and neck supple.   Cardiovascular:      Rate and Rhythm: Regular rhythm. Tachycardia present.      Pulses: Normal pulses.   Pulmonary:      Effort: Pulmonary effort is normal. No respiratory distress.      Breath sounds: Normal breath sounds.   Abdominal:      General: Bowel sounds are normal. There is no distension.      Palpations: Abdomen is soft.      Tenderness: There is no abdominal  tenderness.   Genitourinary:     Comments: Deferred    Musculoskeletal:         General: Swelling (right knee) and tenderness (right knee) present.   Skin:     General: Skin is warm and dry.      Capillary Refill: Capillary refill takes 2 to 3 seconds.      Coloration: Skin is pale.      Findings: No rash.   Neurological:      Mental Status: She is lethargic.      GCS: GCS eye subscore is 3. GCS verbal subscore is 4. GCS motor subscore is 6.   Psychiatric:         Mood and Affect: Affect is flat.         Behavior: Behavior normal.         Cognition and Memory: Cognition is impaired.           CRANIAL NERVES     CN III, IV, VI   Pupils are equal, round, and reactive to light.       Significant Labs:   CBC:   Recent Labs   Lab 10/21/20  1920   WBC 19.19*   HGB 12.1   HCT 37.8        CMP:   Recent Labs   Lab 10/21/20  1920      K 5.7*      CO2 16*   *   BUN 53*   CREATININE 2.3*   CALCIUM 9.6   PROT 6.5   ALBUMIN 3.1*   BILITOT 0.4   ALKPHOS 75   *   ALT 74*   ANIONGAP 18*   EGFRNONAA 18*     Lipid Panel:   Recent Labs   Lab 10/21/20  1920   CHOL 136   HDL 41   LDLCALC 76.4   TRIG 93   CHOLHDL 30.1     TSH:   Recent Labs   Lab 10/21/20  1920   TSH 5.429*     Urine Studies:   Recent Labs   Lab 10/21/20  2117   COLORU Yellow   APPEARANCEUA Clear   PHUR 7.0   SPECGRAV 1.015   PROTEINUA Trace*   GLUCUA Negative   KETONESU Negative   BILIRUBINUA Negative   OCCULTUA 1+*   NITRITE Negative   UROBILINOGEN Negative   LEUKOCYTESUR Trace*   RBCUA 3   WBCUA 4       Significant Imaging: CT head: Moderate brain atrophy with deep white matter ischemic changes.  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  Acute CVA or hemorrhage is not identified on this study.    CT cervical spine:1. No acute cervical spine fracture.  2. Degenerative findings in the cervical spine without critical spinal canal or foraminal narrowing    CT chest/abd/pelvis:1. No acute traumatic findings in the chest, abdomen,  or pelvis.  2. Prominent stool within distended distal colon and rectum suggestive of fecal impaction.  Minimal surrounding inflammatory change raise the possibility of stercoral colitis/proctitis.  3. Indeterminate left renal lesions, as noted on prior CT imaging of 06/08/2018, for which dedicated renal ultrasound would be recommended, this is not been previously performed.  4. Additional incidental and chronic findings, as per report body    Xray R knee:Acute comminuted and displaced fracture of the distal femoral metaphysis without definite intra-articular extension, as described.    CT right knee:1. Severely comminuted, impacted, angulated and displaced fracture of the distal femur, as described.  CT imaging better demonstrates extension of the fracture to the intercondylar notch.  2. No additional acute fractures identified.  3. Additional details, as per report body.

## 2020-10-22 NOTE — RESPIRATORY THERAPY
10/22/20 0343   Patient Assessment/Suction   Level of Consciousness (AVPU) alert   Respiratory Effort Unlabored   PRE-TX-O2   O2 Device (Oxygen Therapy) room air   SpO2 98 %   Pulse Oximetry Type Intermittent   $ Pulse Oximetry - Multiple Charge Pulse Oximetry - Multiple

## 2020-10-22 NOTE — SUBJECTIVE & OBJECTIVE
"  Woke up with symptoms?: no    Recent bleeding noted: no  Does the patient take any Blood Thinners? no  Medications: Antiplatelets:  none      Past Medical History: hypertension, hyperlipidemia, tia and RLS    Past Surgical History: no major surgeries within the last 2 weeks    Family History: no relevant history    Social History: no smoking, no drinking, no drugs    Allergies: Antihistamines - Alkylamine  Hydrocodone  Ibuprofen  Plavix [Clopidogrel]  Prednisone  Tramadol  Triamcinolone  Penicillins No known drug allergies    Review of Systems   Unable to perform ROS: Mental status change     Objective:   Vitals: Blood pressure (!) 112/53, pulse 91, temperature 97.2 °F (36.2 °C), temperature source Oral, resp. rate 20, height 5' 5" (1.651 m), weight 49.9 kg (110 lb), SpO2 100 %. BP: 112/53, Respiratory Rate: 17 and Heart Rate: 78    CT READ: Yes  No hemmorhage. No mass effect. No early infarct signs.     Physical Exam  Vitals signs and nursing note reviewed.   Constitutional:       General: She is not in acute distress.     Appearance: Normal appearance.   HENT:      Head: Normocephalic and atraumatic.      Nose: Nose normal.   Eyes:      Comments: Partial gaze to the R that can be overcome   Neck:      Musculoskeletal: Normal range of motion.   Cardiovascular:      Rate and Rhythm: Normal rate and regular rhythm.   Pulmonary:      Effort: No respiratory distress.   Abdominal:      General: There is no distension.   Genitourinary:     Comments: No performed  Musculoskeletal:      Right lower leg: No edema.      Left lower leg: No edema.   Skin:     Findings: No erythema or rash.   Neurological:      Mental Status: She is alert.      Comments: Disoriented to place and year. Follows commands, no aphasia or dysarthria, partial gaze to the R, dense L hemiplegia.   Psychiatric:         Mood and Affect: Mood normal.         Behavior: Behavior normal.           "

## 2020-10-22 NOTE — ASSESSMENT & PLAN NOTE
Consult Neurology  Neuro checks q4h  Permissive HTN <220/<120 - will hold home antihypertensives and restart when clinically appropriate  MRI of brain  Carotid U/S  ECHO  Trend CBC, CMP, Mg and Phos  Fasting lipid panel  HgA1C  Consult PT/OT  Continue chronic ASA  DVT prophylaxis with DUY's, SCD's.  Consider Lovenox if ok with neurology  Aspiration precautions, fall precautions

## 2020-10-23 PROBLEM — E87.5 HYPERKALEMIA: Status: ACTIVE | Noted: 2020-01-01

## 2020-10-23 PROBLEM — I21.3 STEMI (ST ELEVATION MYOCARDIAL INFARCTION): Status: ACTIVE | Noted: 2020-01-01

## 2020-10-23 PROBLEM — Z51.5 COMFORT MEASURES ONLY STATUS: Status: ACTIVE | Noted: 2020-01-01

## 2020-10-23 NOTE — PT/OT/SLP PROGRESS
Speech Language Pathology      Melody Dupont  MRN: [id number]    (8:37) Patient not seen today secondary to poor level of alertness. Only moans to sternal rub. Does not open eyes. Will follow-up 10/24/2020.    Dior Min, CCC-SLP

## 2020-10-23 NOTE — CHAPLAIN
"Visited pt and daughter-in-law (FÉLIX) for 45 min, per info received from [facility]; FÉLIX says she's not comfort measures yet, nor hospice, but they are discussing hospice and she's ok with that; she said she's the POA and the ACP paperwork shares the pt's wishes and she wouldn't want to live like this; two of pt's adult children are estranged and do not know about pt's condition; FÉLIX knows in her heart that hospice is probably the best option, but she "feels like God" making the decision and she also feels making hospice decision "is the easy way out". Pt had an emotional support dog the family got her and the dog and pt were wonderful; but the dog was overly protective and wouldn't let anybody into the house, which interfered with pt's home care; sadly FÉLIX returned hte dog to the shelter just this morning; said she hadn't cried in 10 yrs until this morning letting that dog go; FÉLIX welcomed me to pray as we held hands; pt as much for DIL as pt; she appreciated the talk and the prayer; Lord in your mercy.  "

## 2020-10-23 NOTE — SUBJECTIVE & OBJECTIVE
Interval History:  Elderly frail female, who is not interactive, has a right gaze preference.  Left upper extremity is weakness persists. Not following commands. Staff reports attempts to pull out IV.     Now with worsening of hyperkalemia. K 6.3 And persistent JAS      Review of Systems   Unable to perform ROS: Mental status change     Objective:     Vital Signs (Most Recent):  Temp: (!) 95 °F (35 °C) (10/23/20 0736)  Pulse: 93 (10/23/20 0736)  Resp: 16 (10/23/20 0736)  BP: 123/60 (10/23/20 0736)  SpO2: 97 % (10/23/20 0736) Vital Signs (24h Range):  Temp:  [95 °F (35 °C)-97.8 °F (36.6 °C)] 95 °F (35 °C)  Pulse:  [] 93  Resp:  [16-20] 16  SpO2:  [93 %-98 %] 97 %  BP: ()/(49-60) 123/60     Weight: 57.8 kg (127 lb 6.8 oz)  Body mass index is 21.2 kg/m².    Intake/Output Summary (Last 24 hours) at 10/23/2020 1039  Last data filed at 10/23/2020 0300  Gross per 24 hour   Intake 2461.67 ml   Output 170 ml   Net 2291.67 ml      Physical Exam  Vitals signs and nursing note reviewed.   Constitutional:       Appearance: She is well-developed. She is ill-appearing.   HENT:      Head: Normocephalic and atraumatic.      Mouth/Throat:      Mouth: Mucous membranes are dry.   Neck:      Musculoskeletal: Normal range of motion and neck supple.   Cardiovascular:      Rate and Rhythm: Normal rate and regular rhythm.      Pulses: Normal pulses.      Heart sounds: Murmur (ASM) present.   Pulmonary:      Effort: Pulmonary effort is normal. No respiratory distress.      Breath sounds: Normal breath sounds.   Abdominal:      General: Bowel sounds are normal. There is no distension.      Palpations: Abdomen is soft.      Tenderness: There is no abdominal tenderness.   Genitourinary:     Comments: Deferred    Musculoskeletal:         General: Swelling (right knee) and tenderness (right knee) present.      Comments: Contractures to TERESITA hands and feet. RLE splint in place   Skin:     General: Skin is warm and dry.      Capillary  Refill: Capillary refill takes 2 to 3 seconds.      Coloration: Skin is pale.      Findings: No rash.   Neurological:      Mental Status: She is unresponsive.      GCS: GCS eye subscore is 3. GCS verbal subscore is 4. GCS motor subscore is 6.      Motor: Weakness present.   Psychiatric:         Mood and Affect: Affect is flat.         Cognition and Memory: Cognition is impaired.         Significant Labs:   CBC:   Recent Labs   Lab 10/21/20  1920 10/22/20  0754 10/23/20  0635   WBC 19.19* 14.61* 9.48   HGB 12.1 11.2* 9.7*   HCT 37.8 37.7 31.1*    132* 133*     CMP:   Recent Labs   Lab 10/21/20  1920 10/22/20  0754  10/22/20  2043 10/22/20  2323 10/23/20  0634    139   < > 142 143 144   K 5.7* 6.0*   < > 5.5* 5.3* 6.3*    107   < > 107 109 110   CO2 16* 17*   < > 18* 21* 20*   * 144*   < > 139* 122* 107   BUN 53* 62*   < > 71* 72* 80*   CREATININE 2.3* 2.6*   < > 2.9* 3.1* 3.2*   CALCIUM 9.6 9.0   < > 8.5* 8.6* 8.5*   PROT 6.5 6.1  --   --   --  5.1*   ALBUMIN 3.1* 2.7*  --   --   --  1.9*   BILITOT 0.4 0.4  --   --   --  0.4   ALKPHOS 75 71  --   --   --  59   * 179*  --   --   --  118*   ALT 74* 74*  --   --   --  59*   ANIONGAP 18* 15   < > 17* 13 14   EGFRNONAA 18* 15*   < > 13* 12* 12*    < > = values in this interval not displayed.     Lipid Panel:   Recent Labs   Lab 10/21/20  1920   CHOL 136   HDL 41   LDLCALC 76.4   TRIG 93   CHOLHDL 30.1     Urine Studies:   Recent Labs   Lab 10/21/20  2117   COLORU Yellow   APPEARANCEUA Clear   PHUR 7.0   SPECGRAV 1.015   PROTEINUA Trace*   GLUCUA Negative   KETONESU Negative   BILIRUBINUA Negative   OCCULTUA 1+*   NITRITE Negative   UROBILINOGEN Negative   LEUKOCYTESUR Trace*   RBCUA 3   WBCUA 4     CPK> 7 K    Significant Imaging:   Imaging Results          CT Knee Without Contrast Right (Final result)  Result time 10/21/20 23:29:16    Final result by Jian Raphael MD (10/21/20 23:29:16)                 Impression:      1. Severely  comminuted, impacted, angulated and displaced fracture of the distal femur, as described.  CT imaging better demonstrates extension of the fracture to the intercondylar notch.  2. No additional acute fractures identified.  3. Additional details, as per report body.      Electronically signed by: Jian Raphael  Date:    10/21/2020  Time:    23:29             Narrative:    EXAMINATION:  CT KNEE WITHOUT CONTRAST RIGHT    CLINICAL HISTORY:  distal femur fx;    TECHNIQUE:  Unenhanced CT imaging of the knee was performed with multiplanar reformats and bone and standard algorithm.    COMPARISON:  Radiographs of the right knee and femur performed 10/21/2020    FINDINGS:  Again seen is severely comminuted, impacted, and angulated fracture distal femoral diaphysis and metaphysis.  There is approximately 46 degrees of anterior angulation of the distal fracture fragment.  There is approximately 1.1 cm lateral and 1 cm anterior displacement of distal fracture fragment.  At least 1.2 cm of override of the impacted proximal distal fracture fragments.    Current CT examination better demonstrates fracture extension to the intercondylar notch.  No definite extension to the tibiofemoral joint space.  No definite acute fractures of the visualized proximal tibia or fibula.  No fracture of the patella identified.    Severe osteoarthritic changes of the medial and lateral joint space are noted.  Moderate joint space narrowing and osteophyte formation of the patellofemoral compartment.    Posttraumatic lipohemarthrosis is noted.  Posttraumatic findings are noted in the soft tissues about the knee joint and proximal tibia.                                X-Ray Femur Ap/Lat Right (Final result)  Result time 10/21/20 22:18:28    Final result by Jian Raphael MD (10/21/20 22:18:28)                 Impression:      Acute comminuted and displaced fracture of the distal femoral metaphysis without definite intra-articular extension, as  described.    This report was flagged in Epic as abnormal.      Electronically signed by: Jian Raphael  Date:    10/21/2020  Time:    22:18             Narrative:    EXAMINATION:  XR KNEE 3 VIEW RIGHT; XR FEMUR 2 VIEW RIGHT    CLINICAL HISTORY:  fall; Unspecified fall, initial encounter    TECHNIQUE:  AP, lateral, and Merchant views of the right knee were performed.    COMPARISON:  Right knee radiographs performed 10/24/2017 and 05/24/2018    FINDINGS:  Acute comminuted and displaced fracture of the distal femoral metaphysis.  There is approximately 1.3 cm of lateral displacement of the proximal fracture fragment.  There is approximately 27 degrees of anterior angulation of the proximal fracture fragment.  No definite extension to the articular surface.    No definite additional acute fractures are seen.  Osseous demineralization is identified.  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  Vascular calcifications.  The joint effusion is identified.  Soft tissue swelling about the knee joint.                                X-Ray Knee 3 View Right (Final result)  Result time 10/21/20 22:18:28   Procedure changed from X-Ray Knee 1 or 2 View Right     Final result by Jian Raphael MD (10/21/20 22:18:28)                 Impression:      Acute comminuted and displaced fracture of the distal femoral metaphysis without definite intra-articular extension, as described.    This report was flagged in Epic as abnormal.      Electronically signed by: Jian Raphael  Date:    10/21/2020  Time:    22:18             Narrative:    EXAMINATION:  XR KNEE 3 VIEW RIGHT; XR FEMUR 2 VIEW RIGHT    CLINICAL HISTORY:  fall; Unspecified fall, initial encounter    TECHNIQUE:  AP, lateral, and Merchant views of the right knee were performed.    COMPARISON:  Right knee radiographs performed 10/24/2017 and 05/24/2018    FINDINGS:  Acute comminuted and displaced fracture of the distal femoral metaphysis.  There is approximately 1.3 cm of  lateral displacement of the proximal fracture fragment.  There is approximately 27 degrees of anterior angulation of the proximal fracture fragment.  No definite extension to the articular surface.    No definite additional acute fractures are seen.  Osseous demineralization is identified.  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  Vascular calcifications.  The joint effusion is identified.  Soft tissue swelling about the knee joint.                               CT Chest Abdomen Pelvis Without Contrast (XPD) (Final result)  Result time 10/21/20 21:45:57    Final result by Jian Raphael MD (10/21/20 21:45:57)                 Impression:      1. No acute traumatic findings in the chest, abdomen, or pelvis.  2. Prominent stool within distended distal colon and rectum suggestive of fecal impaction.  Minimal surrounding inflammatory change raise the possibility of stercoral colitis/proctitis.  3. Indeterminate left renal lesions, as noted on prior CT imaging of 06/08/2018, for which dedicated renal ultrasound would be recommended, this is not been previously performed.  4. Additional incidental and chronic findings, as per report body.      Electronically signed by: Jian Raphael  Date:    10/21/2020  Time:    21:45             Narrative:    EXAMINATION:  CT CHEST ABDOMEN PELVIS WITHOUT CONTRAST(XPD)    CLINICAL HISTORY:  Chest-abdomen-pelvis trauma, blunt;fall,;    TECHNIQUE:  Low dose axial images, sagittal and coronal reformations were obtained from the thoracic inlet to the pubic symphysis    COMPARISON:  CT abdomen and pelvis performed 06/18/2018.  CT chest performed 03/02/2017.    FINDINGS:  This examination is limited due to lack of intravenous contrast.    CHEST    Support tubes and lines: None.    Aorta: Not dilated.  Atherosclerotic calcification.    Heart: Mildly enlarged.    Coronary arteries: Coronary artery and valvular calcifications are noted.    Pericardium: Normal. No effusion, thickening,  or calcification.    Central pulmonary arteries: Normal caliber.    Base of neck/thyroid: Unremarkable.    Lymph nodes: No supraclavicular, axillary, internal mammary, mediastinal, or hilar adenopathy.    Esophagus: Patulous and fluid-filled esophagus.    Pleura: Calcified pleural plaque is noted at the left lung apex.  No pneumothorax or pleural effusion.    Body wall: Unremarkable.    Airways: Unremarkable.    Lungs: Evaluation of the lung parenchyma is somewhat limited due to motion.  Additionally, comparison to prior dedicated chest CT of 03/02/2017 is limited due to differences in technique/slice thickness.  Again noted are peripheral and lower lobe predominant reticulation and bronchial wall thickening.  No evidence of honeycombing is noted.  Some degree of traction bronchiectasis is suggested at the lung apices associated with pleuroparenchymal scarring.  Elsewhere, there are areas of nonspecific mosaic attenuation, which may be on the basis of small vessel and/or small airways disease.  Overall pattern of findings is felt relatively similar to the prior study of 03/02/2017.    Bones: Osseous mineralization.  Multilevel mild height loss of several thoracic vertebral bodies without definite change since 03/02/2017 CT.    ABDOMEN/PELVIS    Liver: Normal contour.    Gallbladder and bile ducts: Unremarkable.    Pancreas: Normal contour.    Spleen: Normal contour.    Adrenals: Normal contour.    Kidneys: 2 hyperattenuating foci are noted in the right kidney, the larger of which is located in the midpole and measures approximately 16 mm (series 2, image 131 in the smaller which is located in the inferior pole measuring approximately 9 mm.  Simple cysts is noted in the superior to mid pole.  No concerning lesions are identified in the right kidney.    Lymph nodes: No abdominal or pelvic lymphadenopathy.    Bowel and mesentery: Small hiatal hernia.  Pronounced amount of stool is noted within the rectum and distal  colon.  No definite wall thickening of the involved bowel.  There may be minimal surrounding inflammatory change in the surrounding fat visualized small bowel colon are otherwise unremarkable.    Abdominal aorta: Heavy atherosclerotic calcification of the aorta and iliac arteries.    Inferior vena cava: Unremarkable.    Free fluid or free air: None.    Pelvis: The uterus appears to be surgically absent.    Urinary bladder: Unremarkable.    Body wall: Unremarkable.    Bones: Chronic deformity of the left proximal femur with suggestion of prior internal fixation.  No metallic hardware is identified on the current study.  Additionally, there is evidence of a chronic right superior and inferior pubic ramus fracture.  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  No definite acute thoracic or lumbar spine fracture is appreciated.                               CT Cervical Spine Without Contrast (Final result)  Result time 10/21/20 21:25:51    Final result by Jian Raphael MD (10/21/20 21:25:51)                 Impression:      1. No acute cervical spine fracture.  2. Degenerative findings in the cervical spine without critical spinal canal or foraminal narrowing      Electronically signed by: Jian Raphael  Date:    10/21/2020  Time:    21:25             Narrative:    EXAMINATION:  CT CERVICAL SPINE WITHOUT CONTRAST    CLINICAL HISTORY:  Neck trauma (Age => 65y);    TECHNIQUE:  Low dose axial images, sagittal and coronal reformations were performed though the cervical spine.  Contrast was not administered.    COMPARISON:  None    FINDINGS:  Fractures: No acute fractures    Alignment: No definite acute traumatic subluxation.  There is grade 1 anterolisthesis of C4 on C5 and C5 on C6.  Atlanto-axial and atlanto-occipital joints: Atlanto-axial and atlanto-occipital intervals are not widened.  Facet joints: There is no traumatic facet joint widening.  Degenerative facet arthropathy.  Vertebral bodies: Osseous  demineralization.  Degenerative plate change most advanced spanning the C5-6 through C7-T1 levels.  Discs: Degenerate disc osteophyte complexes.  Spinal canal and foraminal narrowing: Although CT does not optimally evaluate the soft tissue contents of the spinal canal and foramina, no critical stenosis is suggested.  Paraspinal soft tissues: Soft tissue atherosclerotic vascular calcifications are noted.    Upper Lungs:Nonspecific debris is noted within a patulous esophagus.  Fibrotic changes are noted in the lung apices.  There is bronchiectasis and suggestion of calcified pleural plaque on the left.                               CT Head Without Contrast (Final result)  Result time 10/21/20 19:13:37    Final result by Anuj Bob Jr., MD (10/21/20 19:13:37)                 Impression:      Moderate brain atrophy with deep white matter ischemic changes.  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  Acute CVA or hemorrhage is not identified on this study.      Electronically signed by: Anuj Bob MD  Date:    10/21/2020  Time:    19:13             Narrative:    EXAMINATION:  CT HEAD WITHOUT CONTRAST    CLINICAL HISTORY:  Neuro deficit, acute, stroke suspected;    TECHNIQUE:  Low dose axial images were obtained through the head.  Coronal and sagittal reformations were also performed. Contrast was not administered.    COMPARISON:  MRI brain of February 23, 2015.    FINDINGS:  No cranial fracture is identified.  Scalp edema or hematoma is not noted.    The basal cisterns are clear.  There is no mass effect or midline shift.  There is moderate enlargement of the lateral ventricles and sylvian fissures consistent with brain atrophy.  Hypodensity in the central white matter of the cerebrum bilaterally is consistent with deep white matter ischemic changes.  There is a 7 mm near CSF focal density of the right internal capsule consistent with an old lacunar infarct.    Focal area of increased or decreased  CT density consistent with tumor, edema, acute CVA or hemorrhage is not seen.  No intracranial hemorrhage or hematoma is noted.

## 2020-10-23 NOTE — NURSING
SBP below 100, prn 500ml bolus given, Np notified. No acute changes in pt condition, pt resting in bed comfortably, pupils are round equal and reactive to light, will continue to monitor.

## 2020-10-23 NOTE — PROGRESS NOTES
[facility] Hospital Medicine  Progress Note    Patient Name: Melody Dupont  MRN: [id number]  Patient Class: IP- Inpatient   Admission Date: 10/21/2020  Length of Stay: 2 days  Attending Physician: Estrella Bennett MD  Primary Care Provider: Larisa Wells MD        Subjective:     Principal Problem:Stroke        HPI:  Melody Dupont is a 89 y/o female with a past medical history significant for HTN, HLD, TIA, anemia, RA, RLS, and GERD who presented to the ED with c/o altered mental status.  Pt is accompanied by her daughter in law who provides the history.  Per pt's daughter in law, pt lives alone.  Her daughter in law visits her 3 times daily to assist her with ADL's.  Pt was noted to be in her normal state of health this afternoon around 1 pm.  This evening when pt's daughter in law went back to check on her and help her get ready for bed, she found the patient to be lethargic and vomiting.  Pt was unable to move her left side.  She further relays that pt had an unwitnessed fall yesterday and her right knee is much more swollen than normal.   Pt was assessed for acute stroke while in the ED and determined not to be a candidate for tPA.  Labs are significant for transaminitis, hyperkalemia of 5.7 and JAS with creatinine of 2.3.  CT head was negative for anything acute.  CT C-spine was negative for fracture.  CT chest/abd/pelvis was significant for fecal impaction.  An xray of the patient's right knee was obtained and she is found to have an acute distal femur fracture.  The case was discussed with Dr. Curtis, orthopedic surgeon, and further recommendations were noted.  The patient will be admitted to the service of hospital medicine for continued treatment.           Overview/Hospital Course:  No notes on file    Interval History:  Elderly frail female, who is not interactive, has a right gaze preference.  Left upper extremity is weakness persists. Not following commands. Staff  reports attempts to pull out IV.     Now with worsening of hyperkalemia. K 6.3 And persistent JAS      Review of Systems   Unable to perform ROS: Mental status change     Objective:     Vital Signs (Most Recent):  Temp: (!) 95 °F (35 °C) (10/23/20 0736)  Pulse: 93 (10/23/20 0736)  Resp: 16 (10/23/20 0736)  BP: 123/60 (10/23/20 0736)  SpO2: 97 % (10/23/20 0736) Vital Signs (24h Range):  Temp:  [95 °F (35 °C)-97.8 °F (36.6 °C)] 95 °F (35 °C)  Pulse:  [] 93  Resp:  [16-20] 16  SpO2:  [93 %-98 %] 97 %  BP: ()/(49-60) 123/60     Weight: 57.8 kg (127 lb 6.8 oz)  Body mass index is 21.2 kg/m².    Intake/Output Summary (Last 24 hours) at 10/23/2020 1039  Last data filed at 10/23/2020 0300  Gross per 24 hour   Intake 2461.67 ml   Output 170 ml   Net 2291.67 ml      Physical Exam  Vitals signs and nursing note reviewed.   Constitutional:       Appearance: She is well-developed. She is ill-appearing.   HENT:      Head: Normocephalic and atraumatic.      Mouth/Throat:      Mouth: Mucous membranes are dry.   Neck:      Musculoskeletal: Normal range of motion and neck supple.   Cardiovascular:      Rate and Rhythm: Normal rate and regular rhythm.      Pulses: Normal pulses.      Heart sounds: Murmur (ASM) present.   Pulmonary:      Effort: Pulmonary effort is normal. No respiratory distress.      Breath sounds: Normal breath sounds.   Abdominal:      General: Bowel sounds are normal. There is no distension.      Palpations: Abdomen is soft.      Tenderness: There is no abdominal tenderness.   Genitourinary:     Comments: Deferred    Musculoskeletal:         General: Swelling (right knee) and tenderness (right knee) present.      Comments: Contractures to TERESITA hands and feet. RLE splint in place   Skin:     General: Skin is warm and dry.      Capillary Refill: Capillary refill takes 2 to 3 seconds.      Coloration: Skin is pale.      Findings: No rash.   Neurological:      Mental Status: She is unresponsive.      GCS:  GCS eye subscore is 3. GCS verbal subscore is 4. GCS motor subscore is 6.      Motor: Weakness present.   Psychiatric:         Mood and Affect: Affect is flat.         Cognition and Memory: Cognition is impaired.         Significant Labs:   CBC:   Recent Labs   Lab 10/21/20  1920 10/22/20  0754 10/23/20  0635   WBC 19.19* 14.61* 9.48   HGB 12.1 11.2* 9.7*   HCT 37.8 37.7 31.1*    132* 133*     CMP:   Recent Labs   Lab 10/21/20  1920 10/22/20  0754  10/22/20  2043 10/22/20  2323 10/23/20  0634    139   < > 142 143 144   K 5.7* 6.0*   < > 5.5* 5.3* 6.3*    107   < > 107 109 110   CO2 16* 17*   < > 18* 21* 20*   * 144*   < > 139* 122* 107   BUN 53* 62*   < > 71* 72* 80*   CREATININE 2.3* 2.6*   < > 2.9* 3.1* 3.2*   CALCIUM 9.6 9.0   < > 8.5* 8.6* 8.5*   PROT 6.5 6.1  --   --   --  5.1*   ALBUMIN 3.1* 2.7*  --   --   --  1.9*   BILITOT 0.4 0.4  --   --   --  0.4   ALKPHOS 75 71  --   --   --  59   * 179*  --   --   --  118*   ALT 74* 74*  --   --   --  59*   ANIONGAP 18* 15   < > 17* 13 14   EGFRNONAA 18* 15*   < > 13* 12* 12*    < > = values in this interval not displayed.     Lipid Panel:   Recent Labs   Lab 10/21/20  1920   CHOL 136   HDL 41   LDLCALC 76.4   TRIG 93   CHOLHDL 30.1     Urine Studies:   Recent Labs   Lab 10/21/20  2117   COLORU Yellow   APPEARANCEUA Clear   PHUR 7.0   SPECGRAV 1.015   PROTEINUA Trace*   GLUCUA Negative   KETONESU Negative   BILIRUBINUA Negative   OCCULTUA 1+*   NITRITE Negative   UROBILINOGEN Negative   LEUKOCYTESUR Trace*   RBCUA 3   WBCUA 4     CPK> 7 K    Significant Imaging:   Imaging Results          CT Knee Without Contrast Right (Final result)  Result time 10/21/20 23:29:16    Final result by Jian Raphael MD (10/21/20 23:29:16)                 Impression:      1. Severely comminuted, impacted, angulated and displaced fracture of the distal femur, as described.  CT imaging better demonstrates extension of the fracture to the intercondylar  notch.  2. No additional acute fractures identified.  3. Additional details, as per report body.      Electronically signed by: Jian Raphael  Date:    10/21/2020  Time:    23:29             Narrative:    EXAMINATION:  CT KNEE WITHOUT CONTRAST RIGHT    CLINICAL HISTORY:  distal femur fx;    TECHNIQUE:  Unenhanced CT imaging of the knee was performed with multiplanar reformats and bone and standard algorithm.    COMPARISON:  Radiographs of the right knee and femur performed 10/21/2020    FINDINGS:  Again seen is severely comminuted, impacted, and angulated fracture distal femoral diaphysis and metaphysis.  There is approximately 46 degrees of anterior angulation of the distal fracture fragment.  There is approximately 1.1 cm lateral and 1 cm anterior displacement of distal fracture fragment.  At least 1.2 cm of override of the impacted proximal distal fracture fragments.    Current CT examination better demonstrates fracture extension to the intercondylar notch.  No definite extension to the tibiofemoral joint space.  No definite acute fractures of the visualized proximal tibia or fibula.  No fracture of the patella identified.    Severe osteoarthritic changes of the medial and lateral joint space are noted.  Moderate joint space narrowing and osteophyte formation of the patellofemoral compartment.    Posttraumatic lipohemarthrosis is noted.  Posttraumatic findings are noted in the soft tissues about the knee joint and proximal tibia.                                X-Ray Femur Ap/Lat Right (Final result)  Result time 10/21/20 22:18:28    Final result by Jian Raphael MD (10/21/20 22:18:28)                 Impression:      Acute comminuted and displaced fracture of the distal femoral metaphysis without definite intra-articular extension, as described.    This report was flagged in Epic as abnormal.      Electronically signed by: Jian Raphael  Date:    10/21/2020  Time:    22:18             Narrative:     EXAMINATION:  XR KNEE 3 VIEW RIGHT; XR FEMUR 2 VIEW RIGHT    CLINICAL HISTORY:  fall; Unspecified fall, initial encounter    TECHNIQUE:  AP, lateral, and Merchant views of the right knee were performed.    COMPARISON:  Right knee radiographs performed 10/24/2017 and 05/24/2018    FINDINGS:  Acute comminuted and displaced fracture of the distal femoral metaphysis.  There is approximately 1.3 cm of lateral displacement of the proximal fracture fragment.  There is approximately 27 degrees of anterior angulation of the proximal fracture fragment.  No definite extension to the articular surface.    No definite additional acute fractures are seen.  Osseous demineralization is identified.  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  Vascular calcifications.  The joint effusion is identified.  Soft tissue swelling about the knee joint.                                X-Ray Knee 3 View Right (Final result)  Result time 10/21/20 22:18:28   Procedure changed from X-Ray Knee 1 or 2 View Right     Final result by Jian Raphael MD (10/21/20 22:18:28)                 Impression:      Acute comminuted and displaced fracture of the distal femoral metaphysis without definite intra-articular extension, as described.    This report was flagged in Epic as abnormal.      Electronically signed by: Jian Raphael  Date:    10/21/2020  Time:    22:18             Narrative:    EXAMINATION:  XR KNEE 3 VIEW RIGHT; XR FEMUR 2 VIEW RIGHT    CLINICAL HISTORY:  fall; Unspecified fall, initial encounter    TECHNIQUE:  AP, lateral, and Merchant views of the right knee were performed.    COMPARISON:  Right knee radiographs performed 10/24/2017 and 05/24/2018    FINDINGS:  Acute comminuted and displaced fracture of the distal femoral metaphysis.  There is approximately 1.3 cm of lateral displacement of the proximal fracture fragment.  There is approximately 27 degrees of anterior angulation of the proximal fracture fragment.  No definite  extension to the articular surface.    No definite additional acute fractures are seen.  Osseous demineralization is identified.  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  Vascular calcifications.  The joint effusion is identified.  Soft tissue swelling about the knee joint.                               CT Chest Abdomen Pelvis Without Contrast (XPD) (Final result)  Result time 10/21/20 21:45:57    Final result by Jian Raphael MD (10/21/20 21:45:57)                 Impression:      1. No acute traumatic findings in the chest, abdomen, or pelvis.  2. Prominent stool within distended distal colon and rectum suggestive of fecal impaction.  Minimal surrounding inflammatory change raise the possibility of stercoral colitis/proctitis.  3. Indeterminate left renal lesions, as noted on prior CT imaging of 06/08/2018, for which dedicated renal ultrasound would be recommended, this is not been previously performed.  4. Additional incidental and chronic findings, as per report body.      Electronically signed by: Jian Raphael  Date:    10/21/2020  Time:    21:45             Narrative:    EXAMINATION:  CT CHEST ABDOMEN PELVIS WITHOUT CONTRAST(XPD)    CLINICAL HISTORY:  Chest-abdomen-pelvis trauma, blunt;fall,;    TECHNIQUE:  Low dose axial images, sagittal and coronal reformations were obtained from the thoracic inlet to the pubic symphysis    COMPARISON:  CT abdomen and pelvis performed 06/18/2018.  CT chest performed 03/02/2017.    FINDINGS:  This examination is limited due to lack of intravenous contrast.    CHEST    Support tubes and lines: None.    Aorta: Not dilated.  Atherosclerotic calcification.    Heart: Mildly enlarged.    Coronary arteries: Coronary artery and valvular calcifications are noted.    Pericardium: Normal. No effusion, thickening, or calcification.    Central pulmonary arteries: Normal caliber.    Base of neck/thyroid: Unremarkable.    Lymph nodes: No supraclavicular, axillary, internal  mammary, mediastinal, or hilar adenopathy.    Esophagus: Patulous and fluid-filled esophagus.    Pleura: Calcified pleural plaque is noted at the left lung apex.  No pneumothorax or pleural effusion.    Body wall: Unremarkable.    Airways: Unremarkable.    Lungs: Evaluation of the lung parenchyma is somewhat limited due to motion.  Additionally, comparison to prior dedicated chest CT of 03/02/2017 is limited due to differences in technique/slice thickness.  Again noted are peripheral and lower lobe predominant reticulation and bronchial wall thickening.  No evidence of honeycombing is noted.  Some degree of traction bronchiectasis is suggested at the lung apices associated with pleuroparenchymal scarring.  Elsewhere, there are areas of nonspecific mosaic attenuation, which may be on the basis of small vessel and/or small airways disease.  Overall pattern of findings is felt relatively similar to the prior study of 03/02/2017.    Bones: Osseous mineralization.  Multilevel mild height loss of several thoracic vertebral bodies without definite change since 03/02/2017 CT.    ABDOMEN/PELVIS    Liver: Normal contour.    Gallbladder and bile ducts: Unremarkable.    Pancreas: Normal contour.    Spleen: Normal contour.    Adrenals: Normal contour.    Kidneys: 2 hyperattenuating foci are noted in the right kidney, the larger of which is located in the midpole and measures approximately 16 mm (series 2, image 131 in the smaller which is located in the inferior pole measuring approximately 9 mm.  Simple cysts is noted in the superior to mid pole.  No concerning lesions are identified in the right kidney.    Lymph nodes: No abdominal or pelvic lymphadenopathy.    Bowel and mesentery: Small hiatal hernia.  Pronounced amount of stool is noted within the rectum and distal colon.  No definite wall thickening of the involved bowel.  There may be minimal surrounding inflammatory change in the surrounding fat visualized small bowel  colon are otherwise unremarkable.    Abdominal aorta: Heavy atherosclerotic calcification of the aorta and iliac arteries.    Inferior vena cava: Unremarkable.    Free fluid or free air: None.    Pelvis: The uterus appears to be surgically absent.    Urinary bladder: Unremarkable.    Body wall: Unremarkable.    Bones: Chronic deformity of the left proximal femur with suggestion of prior internal fixation.  No metallic hardware is identified on the current study.  Additionally, there is evidence of a chronic right superior and inferior pubic ramus fracture.  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  No definite acute thoracic or lumbar spine fracture is appreciated.                               CT Cervical Spine Without Contrast (Final result)  Result time 10/21/20 21:25:51    Final result by Jian Raphael MD (10/21/20 21:25:51)                 Impression:      1. No acute cervical spine fracture.  2. Degenerative findings in the cervical spine without critical spinal canal or foraminal narrowing      Electronically signed by: Jian Raphael  Date:    10/21/2020  Time:    21:25             Narrative:    EXAMINATION:  CT CERVICAL SPINE WITHOUT CONTRAST    CLINICAL HISTORY:  Neck trauma (Age => 65y);    TECHNIQUE:  Low dose axial images, sagittal and coronal reformations were performed though the cervical spine.  Contrast was not administered.    COMPARISON:  None    FINDINGS:  Fractures: No acute fractures    Alignment: No definite acute traumatic subluxation.  There is grade 1 anterolisthesis of C4 on C5 and C5 on C6.  Atlanto-axial and atlanto-occipital joints: Atlanto-axial and atlanto-occipital intervals are not widened.  Facet joints: There is no traumatic facet joint widening.  Degenerative facet arthropathy.  Vertebral bodies: Osseous demineralization.  Degenerative plate change most advanced spanning the C5-6 through C7-T1 levels.  Discs: Degenerate disc osteophyte complexes.  Spinal canal  and foraminal narrowing: Although CT does not optimally evaluate the soft tissue contents of the spinal canal and foramina, no critical stenosis is suggested.  Paraspinal soft tissues: Soft tissue atherosclerotic vascular calcifications are noted.    Upper Lungs:Nonspecific debris is noted within a patulous esophagus.  Fibrotic changes are noted in the lung apices.  There is bronchiectasis and suggestion of calcified pleural plaque on the left.                               CT Head Without Contrast (Final result)  Result time 10/21/20 19:13:37    Final result by Anuj Bob Jr., MD (10/21/20 19:13:37)                 Impression:      Moderate brain atrophy with deep white matter ischemic changes.  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  Acute CVA or hemorrhage is not identified on this study.      Electronically signed by: Anuj Bob MD  Date:    10/21/2020  Time:    19:13             Narrative:    EXAMINATION:  CT HEAD WITHOUT CONTRAST    CLINICAL HISTORY:  Neuro deficit, acute, stroke suspected;    TECHNIQUE:  Low dose axial images were obtained through the head.  Coronal and sagittal reformations were also performed. Contrast was not administered.    COMPARISON:  MRI brain of February 23, 2015.    FINDINGS:  No cranial fracture is identified.  Scalp edema or hematoma is not noted.    The basal cisterns are clear.  There is no mass effect or midline shift.  There is moderate enlargement of the lateral ventricles and sylvian fissures consistent with brain atrophy.  Hypodensity in the central white matter of the cerebrum bilaterally is consistent with deep white matter ischemic changes.  There is a 7 mm near CSF focal density of the right internal capsule consistent with an old lacunar infarct.    Focal area of increased or decreased CT density consistent with tumor, edema, acute CVA or hemorrhage is not seen.  No intracranial hemorrhage or hematoma is noted.                                     Assessment/Plan:      * Stroke  Follow Neurology recommendations.  MRI results reviewed with patient's family.  Neuro checks q4h  Permissive HTN <220/<120 - will hold home antihypertensives and restart when clinically appropriate  Follow Carotid U/S and ECHO-reviewed images.  Check fasting lipid profile.  Continue chronic ASA  DVT prophylaxis with DUY's, SCD's.  Consider Lovenox if ok with neurology  Aspiration precautions, fall precautions        STEMI (ST elevation myocardial infarction)  Patient with ST segment elevation MI noted on telemetry. Daughter does not wish for treatment for STEMI. Offered [facility]. Patient to transition to hospice      Hyperkalemia  Remains NPO. Order IV calcium, glucose, insulin. BMP every 4 hours      Goals of care, counseling/discussion  Advance Care Planning     Living Will  During this visit, I engaged the daughter in law (POA) - Caronquirino Dupont  in the advance care planning process.  The patient and I reviewed the role for advance directives and their purpose in directing future healthcare if the patient's unable to speak for him/herself.  At this point in time, the patient does have full decision-making capacity.  We discussed different extreme health states that she could experience, and reviewed what kind of medical care she would want in those situations.  The daughter in law (POA) communicated that if she were comatose and had little chance of a meaningful recovery, she would not want machines/life-sustaining treatments used. I spent a total of 20 minutes engaging the patient in this advance care planning discussion - 10/22/2020    Advance Care Planning     [facility]  I engaged the family in a conversation about advance care planning and we specifically addressed what the goals of care would be moving forward, in light of the patient's change in clinical status, specifically unresponsive due to CVA, JAS, right femur fracture.  We did specifically  address the patient's likely prognosis, which is poor.  We explored the patient's values and preferences for future care.  The family endorses that what is most important right now is to focus on symptom/pain control    Accordingly, we have decided that the best plan to meet the patient's goals includes enrolling in hospice care   patient to be evaluated for inpatient hospice.    I did explain the role for hospice care at this stage of the patient's illness, including its ability to help the patient live with the best quality of life possible.  We will be making a hospice referral.    I spent a total of 45 minutes engaging the patient in this advance care planning discussion. 10/23/2020                             Other fracture of right femur, initial encounter for closed fracture  Consult orthopedic surgeon-Spoke with Dr. Curtis.  Recommends long leg posterior splint with stirrups if possible; otherwise, knee immobilizer.  CT right knee obtained per his request.  Lovenox OK from his perspective-will defer decision to neurology.  He does not expect immediate surgical intervention given pt's hx stroke.  Will consult PT/OT when appropriate.  Pain control acutely-will utilize IV tylenol with caution given transaminitis.  Pt's daughter in law reports that pt hallucinates with narcotics.       Acute kidney injury superimposed on chronic kidney disease  Likely 2/2 IVVD. Gentle IVF hydration.  Follow renal panel and electrolytes closely.  Adjust renal dose medications for Estimated Creatinine Clearance: 9.9 mL/min (A) (based on SCr of 3.2 mg/dL (H)).  Avoid NSAIDs, Castro-II inhibitors, ACE-I, Angiotensin Receptor Blockers, or Aminoglycosides.  Urine analysis.    Consult nephrology.                 RLS (restless legs syndrome)  Chronic; continue mirapex.      Rheumatoid arthritis involving multiple sites with positive rheumatoid factor, onset 1972  History noted.      Gastroesophageal reflux disease without  esophagitis  Chronic; will utilize pepcid acutely.      Mixed hyperlipidemia, baseline LDL not available  Chronic; continue statin.      HTN (hypertension), onset 2014  Chronic; stable.  Hold chronic antihypertensives for now to allow for permissive HTN.  Resume chronic medications when clinically appropriate.        VTE Risk Mitigation (From admission, onward)         Ordered     IP VTE HIGH RISK PATIENT  Once      10/22/20 0043     Place sequential compression device  Until discontinued      10/22/20 0043     Reason for No Pharmacological VTE Prophylaxis  Once     Question:  Reasons:  Answer:  Physician Provided (leave comment)    10/22/20 0043                Discharge Planning   AYDE:      Code Status: DNR   Is the patient medically ready for discharge?:     Reason for patient still in hospital (select all that apply): Patient new problem, Patient trending condition, Laboratory test and Treatment  Discharge Plan A: Skilled Nursing Facility                  Radha Guo NP  Department of Hospital Medicine   [facility]

## 2020-10-23 NOTE — NURSING
Received call from midline department instruction for me to call [phone] to have the picc team come to assist with procedure. Number called and patient information forwarded.

## 2020-10-23 NOTE — ASSESSMENT & PLAN NOTE
Likely 2/2 IVVD. Gentle IVF hydration.  Follow renal panel and electrolytes closely.  Adjust renal dose medications for Estimated Creatinine Clearance: 9.9 mL/min (A) (based on SCr of 3.2 mg/dL (H)).  Avoid NSAIDs, Castro-II inhibitors, ACE-I, Angiotensin Receptor Blockers, or Aminoglycosides.  Urine analysis.    Consult nephrology.

## 2020-10-23 NOTE — PLAN OF CARE
Pt is more easily aroused, pt responds to physical stimuli, pt does not answer questions or follow instructions, unable to assess for numbness or tingling, Hyperkalemia treated and monitored, incontinent care performed, glucose checked per orders, fall precautions maintained, will continue to monitor.

## 2020-10-23 NOTE — ASSESSMENT & PLAN NOTE
Advance Care Planning     Living Will  During this visit, I engaged the daughter in law (POA) - Caron Dupont  in the advance care planning process.  The patient and I reviewed the role for advance directives and their purpose in directing future healthcare if the patient's unable to speak for him/herself.  At this point in time, the patient does have full decision-making capacity.  We discussed different extreme health states that she could experience, and reviewed what kind of medical care she would want in those situations.  The daughter in law (POA) communicated that if she were comatose and had little chance of a meaningful recovery, she would not want machines/life-sustaining treatments used. I spent a total of 20 minutes engaging the patient in this advance care planning discussion - 10/22/2020    Advance Care Planning     [facility]  I engaged the family in a conversation about advance care planning and we specifically addressed what the goals of care would be moving forward, in light of the patient's change in clinical status, specifically unresponsive due to CVA, JAS, right femur fracture.  We did specifically address the patient's likely prognosis, which is poor.  We explored the patient's values and preferences for future care.  The family endorses that what is most important right now is to focus on symptom/pain control    Accordingly, we have decided that the best plan to meet the patient's goals includes enrolling in hospice care   patient to be evaluated for inpatient hospice.    I did explain the role for hospice care at this stage of the patient's illness, including its ability to help the patient live with the best quality of life possible.  We will be making a hospice referral.    I spent a total of 45 minutes engaging the patient in this advance care planning discussion. 10/23/2020

## 2020-10-23 NOTE — NURSING
NP lázaro rees notified of st elevation worsening. Instructed patient is on comfort care and drn. No orders received.

## 2020-10-23 NOTE — CARE UPDATE
10/23/20 0840   Patient Assessment/Suction   Level of Consciousness (AVPU) responds to pain   PRE-TX-O2   O2 Device (Oxygen Therapy) nasal cannula   $ Is the patient on Low Flow Oxygen? Yes   Flow (L/min) 2   Oxygen Concentration (%) 28   SpO2 96 %   Pulse Oximetry Type Intermittent   $ Pulse Oximetry - Multiple Charge Pulse Oximetry - Multiple   Pulse 95   Resp 16

## 2020-10-23 NOTE — PROCEDURES
ELECTROENCEPHALOGRAM REPORT    DATE OF SERVICE:  10/23/2020  EEG NUMBER: ON   REQUESTED BY:  Dr. Junior Parr  LOCATION OF SERVICE:  [facility] Inpatient    METHODOLOGY   Electroencephalographic (EEG) recording is with electrodes placed according to the International 10-20 placement system.  Thirty two (32) channels of digital signal (sampling rate of 512/sec) including T1 and T2 was simultaneously recorded from the scalp and may include  EKG, EMG, and/or eye monitors.  Recording band pass was 0.1 to 512 hz.  Digital video recording of the patient is simultaneously recorded with the EEG.  The patient is instructed report clinical symptoms which may occur during the recording session.  EEG and video recording is stored and archived in digital format. Activation procedures which include photic stimulation, hyperventilation and instructing patients to perform simple task are done in selected patients.    The EEG is displayed on a monitor screen and can be reviewed using different montages.  Computer assisted analysis is employed to detect spike and electrographic seizure activity.   The entire record is submitted for computer analysis.  The entire recording is visually reviewed and the times identified by computer analysis as being spikes or seizures are reviewed again.  Compresses spectral analysis (CSA) is also performed on the activity recorded from each individual channel.  This is displayed as a power display of frequencies from 0 to 30 Hz over time.   The CSA is reviewed looking for asymmetries in power between homologous areas of the scalp and then compared with the original EEG recording.     Live[employer] software was also utilized in the review of this study.  This software suite analyzes the EEG recording in multiple domains.  Coherence and rhythmicity is computed to identify EEG sections which may contain organized seizures.  Each channel undergoes analysis to detect presence of spike and sharp waves which have  special and morphological characteristic of epileptic activity.  The routine EEG recording is converted from spacial into frequency domain.  This is then displayed comparing homologous areas to identify areas of significant asymmetry.  Algorithm to identify non-cortically generated artifact is used to separate eye movement, EMG and other artifact from the EEG    EEG FINGINGS  Recording was obtained at the patient's hospital bedside,  patient was lying quietly in bed and moving very little.  The EEG showed background diffuse theta frequencies seen over both hemispheres.  This is punctuated by periodic rhythmic epileptic discharges which occurred symmetrically over both hemispheres at a rate of 1-2 per second.  Pattern continued throughout the recording.  There is no clinical behavioral changes associated with the pattern.       Activation procedures:   Photic Stimulation - Not performed  Hyperventilation - Not performed    Cognitive testing:   Not performed    Cardiac Monitor:   Single lead EKG was obtained and showed a regular cardiac rhythm, with a  rate of approximately 70-80    IMPRESSION:  Abnormal EEG -   EEG showed generalized periodic epileptic discharges (GPEDs) a which was continuous throughout the recording.  The the patient is and non convulsive status epilepticus (NCSE)

## 2020-10-23 NOTE — ASSESSMENT & PLAN NOTE
Follow Neurology recommendations.  MRI results reviewed with patient's family.  Neuro checks q4h  Permissive HTN <220/<120 - will hold home antihypertensives and restart when clinically appropriate  Follow Carotid U/S and ECHO-reviewed images.  Check fasting lipid profile.  Continue chronic ASA  DVT prophylaxis with DUY's, SCD's.  Consider Lovenox if ok with neurology  Aspiration precautions, fall precautions

## 2020-10-23 NOTE — ASSESSMENT & PLAN NOTE
Patient with ST segment elevation MI noted on telemetry. Daughter does not wish for treatment for STEMI. Offered transfer to [facility] for angiogram. Patient to transition to hospice

## 2020-10-23 NOTE — PT/OT/SLP PROGRESS
Physical Therapy      Patient Name:  Melody Dupont   MRN:  [id number]    Patient not seen today secondary to Other (Comment)(patient on hold for PT today due to patient's new renal problems.). Will follow-up 10/24/2020.    Chris Megilligan, PT

## 2020-10-23 NOTE — PLAN OF CARE
I met with the pts daughter Caron Dupont [phone] at bedside. She selected [facility]. Packet sent to [facility] via [facility]. Varinder updated at [phone]. Email sent to JEROD Garcia CM Supervisor requesting inpatient hospice care. The pts back up plan is either hospice house or [place]. She is going to speak to [facility] about which is best. Manually faxed to Varinder at [phone] Belen Arita LCSW       4:27- I updated Varinder that I am leaving for the day and the pts nurse is Daisy at [phone] if she needs any assistance. Belen Arita LCSW       10/23/20 1603   Post-Acute Status   Post-Acute Authorization Hospice   Hospice Status Referrals Sent   Patient choice form signed by patient/caregiver List with quality metrics by geographic area provided

## 2020-10-24 NOTE — ASSESSMENT & PLAN NOTE
Plan of care reviewed with family, bedside nurse .    Patient  Appears confused, currently in discomfort   Family elected to make patient comfort care given grave prognosis  Continue present POC for now and as soon as the family arrives will adjust as follows-    - Viramontes in place for comfort  - Central Line Catheter in place for IV medication administration   - Will consult CM regarding home hospice care.   - Morphine and Ativan PRN  Started on Morphine Infusion on   100 mg of Morphine in 100 mL of Dextrose 5%.   Final Conc is 1 mg/mL   Scopolamine for secretions

## 2020-10-24 NOTE — DISCHARGE SUMMARY
[facility] Hospital Medicine  Discharge Summary      Patient Name: Melody Dupont  MRN: [id number]  Admission Date: 10/21/2020  Hospital Length of Stay: 2 days  Discharge Date and Time: 10/23/2020  6:51 PM  Attending Physician: No att. providers found   Discharging Provider: Radha Guo NP  Primary Care Provider: Larisa Wells MD      HPI:   Melody Dupont is a 89 y/o female with a past medical history significant for HTN, HLD, TIA, anemia, RA, RLS, and GERD who presented to the ED with c/o altered mental status.  Pt is accompanied by her daughter in law who provides the history.  Per pt's daughter in law, pt lives alone.  Her daughter in law visits her 3 times daily to assist her with ADL's.  Pt was noted to be in her normal state of health this afternoon around 1 pm.  This evening when pt's daughter in law went back to check on her and help her get ready for bed, she found the patient to be lethargic and vomiting.  Pt was unable to move her left side.  She further relays that pt had an unwitnessed fall yesterday and her right knee is much more swollen than normal.   Pt was assessed for acute stroke while in the ED and determined not to be a candidate for tPA.  Labs are significant for transaminitis, hyperkalemia of 5.7 and JAS with creatinine of 2.3.  CT head was negative for anything acute.  CT C-spine was negative for fracture.  CT chest/abd/pelvis was significant for fecal impaction.  An xray of the patient's right knee was obtained and she is found to have an acute distal femur fracture.  The case was discussed with Dr. Curtis, orthopedic surgeon, and further recommendations were noted.  The patient will be admitted to the service of hospital medicine for continued treatment.           * No surgery found *      Hospital Course:   Patient monitor closely during hospitalization.  She underwent complete neurological workup.  Neurology consulted.  She was noted to have acute  stroke on MRI.  PT OT and ST consulted.  EEG obtained and demonstrated epileptic discharges.  Patient remained unresponsive during hospitalization with initially purposeful movement of her right upper extremity.  She was also noted to have acute kidney injury and hyperkalemia.  She received several doses of IV calcium, glucose and D5.  She received a dose of IV Lasix with improvement of her potassium.  Nephrology consulted.  Orthopedic surgery consulted for right femur fracture.  Patient subsequently had a STEMI noted on telemetry.  EKG obtained and is consistent with a MI.  Family wishes for no further treatment and does not wish for her to be transferred for surgical procedure.  Personally conducted several discussions with family regarding comfort care as she has an overall poor prognosis with recent femur fracture, acute stroke and now MI.  She remains comatose.  Patient was transition to inpatient hospice.     Consults:   Consults (From admission, onward)        Status Ordering Provider     Inpatient consult to Registered Dietitian/Nutritionist  Once     Provider:  (Not yet assigned)    Completed GITA ROMERO     IP consult to case management/social work  Once     Provider:  (Not yet assigned)    Completed GITA ROMERO          No new Assessment & Plan notes have been filed under this hospital service since the last note was generated.  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]  No    Acute kidney injury superimposed on chronic kidney disease [N17.9, N18.9] 10/22/2020 Yes    Other fracture of right femur, initial encounter for closed fracture [S72.8X1A]  10/22/2020 Yes    Moderate malnutrition [E44.0] 10/22/2020 Yes    Goals of care, counseling/discussion [Z71.89] 10/22/2020 Not Applicable    RLS (restless legs syndrome) [G25.81] 04/28/2017 Yes    Rheumatoid arthritis involving multiple sites with positive rheumatoid factor, onset 1972 [M05.79] 12/01/2015 Yes    Gastroesophageal reflux disease without esophagitis [K21.9] 11/16/2015 Yes    Mixed hyperlipidemia, baseline LDL not available [E78.2] 02/20/2015 Yes    HTN (hypertension), onset 2014 [I10] 03/14/2012 Yes      Problems Resolved During this Admission:       Discharged Condition: poor    Disposition: Hospice/Medical Facility    Follow Up:    Patient Instructions:   No discharge procedures on file.    Significant Diagnostic Studies: Labs:   BMP:   Recent Labs   Lab 10/22/20  2323 10/23/20  0634 10/23/20  1138   * 107 88    144 144   K 5.3* 6.3* 5.2*    110 113*   CO2 21* 20* 18*   BUN 72* 80* 82*   CREATININE 3.1* 3.2* 3.0*   CALCIUM 8.6* 8.5* 9.3   MG 2.4  --   --    , CMP   Recent Labs   Lab 10/22/20  2323 10/23/20  0634 10/23/20  1138    144 144   K 5.3* 6.3* 5.2*    110 113*   CO2 21* 20* 18*   * 107 88   BUN 72* 80* 82*   CREATININE 3.1* 3.2* 3.0*   CALCIUM 8.6* 8.5* 9.3   PROT  --  5.1*  --    ALBUMIN  --  1.9*  --    BILITOT  --  0.4  --    ALKPHOS  --  59  --    AST  --  118*  --    ALT  --  59*  --    ANIONGAP 13 14 13   ESTGFRAFRICA 14* 14* 15*   EGFRNONAA 12* 12* 13*    and Troponin   Recent Labs   Lab 10/22/20  0754 10/23/20  1138   TROPONINI 0.403* 0.223*       Pending Diagnostic Studies:     None         Medications:  Reconciled Home Medications:      Medication List      ASK your doctor about these medications    aspirin 81 MG EC tablet  Commonly known as: ECOTRIN  Take 162 mg by mouth once daily. 2 at night     cyanocobalamin 1000 MCG tablet  Commonly known as: VITAMIN B-12  Take 100 mcg by mouth once daily.     docusate sodium 100 MG  capsule  Commonly known as: COLACE  Take 100 mg by mouth 2 (two) times daily.     fluticasone propionate 50 mcg/actuation nasal spray  Commonly known as: FLONASE  1 spray (50 mcg total) by Each Nostril route once daily.     hydrOXYzine HCL 10 MG Tab  Commonly known as: ATARAX  Take 1 tablet (10 mg total) by mouth nightly as needed.     lisinopriL 20 MG tablet  Commonly known as: PRINIVIL,ZESTRIL  Take 1 tablet (20 mg total) by mouth once daily.     pantoprazole 40 MG tablet  Commonly known as: PROTONIX  Take 1 tablet (40 mg total) by mouth once daily.     pramipexole 0.125 MG tablet  Commonly known as: MIRAPEX  Take 1 tablet (0.125 mg total) by mouth nightly as needed (rls).     pravastatin 10 MG tablet  Commonly known as: PRAVACHOL  Take 1 tablet (10 mg total) by mouth once daily.     triamcinolone acetonide 0.1% 0.1 % cream  Commonly known as: KENALOG  Apply topically 2 (two) times daily.            Indwelling Lines/Drains at time of discharge:   Lines/Drains/Airways     Drain                 Urethral Catheter 10/21/20 1915 Non-latex 16 Fr. 2 days                Time spent on the discharge of patient: 60 minutes  Patient was seen and examined on the date of discharge and determined to be suitable for discharge.     Discussed plan of care with Dr. Stuart CONROY.  He evaluated patient at bedside and discussed with family  Comfort care    Radha Guo NP  Department of Hospital Medicine  [facility]

## 2020-10-24 NOTE — PLAN OF CARE
Patient unresponsive. resting in bed. NAD. Patient is on comfort measures only with [facility]. Brief for incontinence. Viramontes to gravity. Family remains at bedside throughout shift. Patient turned every 2 hours. Prn meds given for comfort. Bed alarm active. Plan of care reviewed with patient family.  Verbalizes understanding.Call light in reach. Pt free from fall or injury. Will monitor.

## 2020-10-24 NOTE — NURSING
[facility] on call nurse notified of pt's passing.  notified and stated to have the hospice nurse call him.

## 2020-10-24 NOTE — HPI
Melody Dupont is a 89 y/o female with a past medical history significant for HTN, HLD, TIA, anemia, RA, RLS, and GERD who presented to the ED with c/o altered mental status.   She underwent complete neurological workup.  Neurology consulted.  She was noted to have acute stroke on MRI.  PT OT and ST consulted.  EEG obtained and demonstrated epileptic discharges.  Patient remained unresponsive during hospitalization with initially purposeful movement of her right upper extremity.  She was also noted to have acute kidney injury and hyperkalemia.  She received several doses of IV calcium, glucose and D5.  She received a dose of IV Lasix with improvement of her potassium.  Nephrology consulted.  Orthopedic surgery consulted for right femur fracture.  Patient subsequently had a STEMI noted on telemetry.  EKG obtained and is consistent with a MI.  Family wishes for no further treatment and does not wish for her to be transferred for surgical procedure.  Personally conducted several discussions with family regarding comfort care as she has an overall poor prognosis with recent femur fracture, acute stroke and now MI.  She remains comatose.  Patient was transition to inpatient hospice.

## 2020-10-24 NOTE — CARE UPDATE
I personally saw and assessed the patient. No respirations were identified by visualization or auscultation. she had no carotid pulsations and no heart rate was identified by auscultation. Patient's pupils were fixed and dilated. she was pronounced dead by myself at 1445 PM on 10/24/2020

## 2020-10-24 NOTE — HOSPITAL COURSE
Patient monitor closely during hospitalization.  She underwent complete neurological workup.  Neurology consulted.  She was noted to have acute stroke on MRI.  PT OT and ST consulted.  EEG obtained and demonstrated epileptic discharges.  Patient remained unresponsive during hospitalization with initially purposeful movement of her right upper extremity.  She was also noted to have acute kidney injury and hyperkalemia.  She received several doses of IV calcium, glucose and D5.  She received a dose of IV Lasix with improvement of her potassium.  Nephrology consulted.  Orthopedic surgery consulted for right femur fracture.  Patient subsequently had a STEMI noted on telemetry.  EKG obtained and is consistent with a MI.  Family wishes for no further treatment and does not wish for her to be transferred for surgical procedure.  Personally conducted several discussions with family regarding comfort care as she has an overall poor prognosis with recent femur fracture, acute stroke and now MI.  She remains comatose.  Patient was transition to inpatient hospice.

## 2020-10-24 NOTE — DISCHARGE SUMMARY
[facility] Hospital Medicine  Discharge Summary      Patient Name: Melody Dupont  MRN: [id number]  Admission Date: 10/23/2020  Hospital Length of Stay: 1 days  Discharge Date and Time:  10/24/2020 3:12 PM  Attending Physician: Estrella Bennett MD   Discharging Provider: Estrella Bennett MD  Primary Care Provider: Larisa Wells MD      HPI:   Melody Dupont is a 89 y/o female with a past medical history significant for HTN, HLD, TIA, anemia, RA, RLS, and GERD who presented to the ED with c/o altered mental status.   She underwent complete neurological workup.  Neurology consulted.  She was noted to have acute stroke on MRI.  PT OT and ST consulted.  EEG obtained and demonstrated epileptic discharges.  Patient remained unresponsive during hospitalization with initially purposeful movement of her right upper extremity.  She was also noted to have acute kidney injury and hyperkalemia.  She received several doses of IV calcium, glucose and D5.  She received a dose of IV Lasix with improvement of her potassium.  Nephrology consulted.  Orthopedic surgery consulted for right femur fracture.  Patient subsequently had a STEMI noted on telemetry.  EKG obtained and is consistent with a MI.  Family wishes for no further treatment and does not wish for her to be transferred for surgical procedure.  Personally conducted several discussions with family regarding comfort care as she has an overall poor prognosis with recent femur fracture, acute stroke and now MI.  She remains comatose.  Patient was transition to inpatient hospice.     * No surgery found *      Hospital Course:   Patient was done transition to inpatient hospice.  Was started on morphine drip.  Patient remained comfortable on morphine drip.  No signs of distress.  On 10/24/2022 at 2:45 p.m. I was called to pronounce the patient. I personally saw and assessed the patient. No respirations were identified by visualization or  auscultation. she had no carotid pulsations and no heart rate was identified by auscultation. Patient's pupils were fixed and dilated. she was pronounced dead by myself at 1445 pm on 10/24/2020          Consults:     No new Assessment & Plan notes have been filed under this hospital service since the last note was generated.  Service: Hospital Medicine    Final Active Diagnoses:    Diagnosis Date Noted POA    Comfort measures only status [Z51.5] 10/23/2020 Not Applicable    Stroke [I63.9] 10/21/2020 Yes      Problems Resolved During this Admission:       Discharged Condition:     Disposition:     Follow Up:    Patient Instructions:   No discharge procedures on file.    Significant Diagnostic Studies: Labs:   BMP:   Recent Labs   Lab 10/22/20  2323 10/23/20  0634 10/23/20  1138   * 107 88    144 144   K 5.3* 6.3* 5.2*    110 113*   CO2 21* 20* 18*   BUN 72* 80* 82*   CREATININE 3.1* 3.2* 3.0*   CALCIUM 8.6* 8.5* 9.3   MG 2.4  --   --     and CBC   Recent Labs   Lab 10/23/20  0635   WBC 9.48   HGB 9.7*   HCT 31.1*   *     Microbiology:   Blood Culture No results found for: LABBLOO, Sputum Culture No results found for: GSRESP, RESPIRATORYC and Urine Culture    Lab Results   Component Value Date    LABURIN (A) 2020      Comment:        CULTURE, URINE, ROUTINE         Micro Number:      [id number]    Test Status:       Final    Specimen Source:   URINE    Specimen Quality:  Adequate    Result:            10,000-50,000 CFU/mL of Group B Streptococcus isolated                       Beta-hemolytic Streptococci are predictably                       susceptible to penicillin and other beta-lactams.                       Susceptibility testing not routinely performed.    Comment:           Erythromycin and clindamycin are not recommended                       for treatment of urinary tract infections,                       but clindamycin may be useful for treatment of                        rectovaginal colonization or infection.    Result:            Greater than 100,000 CFU/mL of                       Streptococcus viridans group                       May represent colonizers from external and                       internal genitalia. No further testing (including                       susceptibility) will be performed.         Pending Diagnostic Studies:     None         Medications:  None (patient  at medical facility)    Indwelling Lines/Drains at time of discharge:   Lines/Drains/Airways     Drain                 Urethral Catheter 10/21/20 9985 Non-latex 16 Fr. 2 days                Time spent on the discharge of patient: 60 minutes  Patient was seen and examined on the date of discharge and determined to be suitable for discharge.         Estrella Bennett MD  Department of Hospital Medicine  [facility]

## 2020-10-24 NOTE — H&P
[facility] Medicine  History & Physical    Patient Name: Melody Dupont  MRN: [id number]  Admission Date: 10/23/2020  Attending Physician: Estrella Bennett MD   Primary Care Provider: Larisa Wells MD         Patient information was obtained from spouse/SO, past medical records and ER records.     Subjective:     Principal Problem:STEMI comfort care  Chief Complaint:  Inpatient hospice       HPI: Melody Dupont is a 89 y/o female with a past medical history significant for HTN, HLD, TIA, anemia, RA, RLS, and GERD who presented to the ED with c/o altered mental status.   She underwent complete neurological workup.  Neurology consulted.  She was noted to have acute stroke on MRI.  PT OT and ST consulted.  EEG obtained and demonstrated epileptic discharges.  Patient remained unresponsive during hospitalization with initially purposeful movement of her right upper extremity.  She was also noted to have acute kidney injury and hyperkalemia.  She received several doses of IV calcium, glucose and D5.  She received a dose of IV Lasix with improvement of her potassium.  Nephrology consulted.  Orthopedic surgery consulted for right femur fracture.  Patient subsequently had a STEMI noted on telemetry.  EKG obtained and is consistent with a MI.  Family wishes for no further treatment and does not wish for her to be transferred for surgical procedure.  Personally conducted several discussions with family regarding comfort care as she has an overall poor prognosis with recent femur fracture, acute stroke and now MI.  She remains comatose.  Patient was transition to inpatient hospice.     Past Medical History:   Diagnosis Date    Anemia     Cataract     Colitis     Disorder of kidney and ureter     Gastric ulcer     Hyperlipidemia     Hypertension     Malnutrition     Osteoporosis     PUD (peptic ulcer disease)     Restless leg syndrome     Rheumatoid arthritis     Seizure  disorder, status epilepticus, nonconvulsive     STEMI (ST elevation myocardial infarction) 10/23/2020    Stroke 10/21/2020    TIA (transient ischemic attack) 1/23/12       Past Surgical History:   Procedure Laterality Date    ADENOIDECTOMY      FRACTURE SURGERY      left hip ORIF    HAND SURGERY      HYSTERECTOMY      complete    TONSILLECTOMY         Review of patient's allergies indicates:   Allergen Reactions    Antihistamines - alkylamine Other (See Comments)     Other reaction(s): Hallucinations    Hydrocodone Other (See Comments)     Confusion, isolated, abandon    Ibuprofen      Gi bleed, ckd    Plavix [clopidogrel] Hives    Prednisone Hallucinations     Other reaction(s): Hallucinations    Tramadol     Triamcinolone Other (See Comments)     hallucinations    Penicillins Rash     Other reaction(s): Unknown       Current Facility-Administered Medications on File Prior to Encounter   Medication    [COMPLETED] levETIRAcetam in NaCl (iso-os) IVPB 1,000 mg    [DISCONTINUED] 0.9%  NaCl infusion    [DISCONTINUED] aspirin EC tablet 162 mg    [DISCONTINUED] atorvastatin tablet 40 mg    [DISCONTINUED] cyanocobalamin tablet 100 mcg    [DISCONTINUED] dextrose 50% injection 25 g    [DISCONTINUED] dextrose 50% injection 25 g    [DISCONTINUED] docusate sodium capsule 100 mg    [DISCONTINUED] famotidine tablet 20 mg    [DISCONTINUED] fluticasone propionate 50 mcg/actuation nasal spray 50 mcg    [DISCONTINUED] hydrOXYzine HCL tablet 10 mg    [DISCONTINUED] labetaloL injection 10 mg    [DISCONTINUED] levETIRAcetam in NaCl (iso-os) IVPB 500 mg    [DISCONTINUED] lorazepam injection 1 mg    [DISCONTINUED] mupirocin 2 % ointment    [DISCONTINUED] ondansetron injection 4 mg    [DISCONTINUED] pramipexole tablet 0.125 mg    [DISCONTINUED] scopolamine 1.3-1.5 mg (1 mg over 3 days) 1 patch    [DISCONTINUED] sodium chloride 0.9% flush 10 mL     Current Outpatient Medications on File Prior to Encounter    Medication Sig    aspirin (ECOTRIN) 81 MG EC tablet Take 162 mg by mouth once daily. 2 at night    cyanocobalamin (VITAMIN B-12) 1000 MCG tablet Take 100 mcg by mouth once daily.    docusate sodium (COLACE) 100 MG capsule Take 100 mg by mouth 2 (two) times daily.    fluticasone propionate (FLONASE) 50 mcg/actuation nasal spray 1 spray (50 mcg total) by Each Nostril route once daily.    hydrOXYzine HCL (ATARAX) 10 MG Tab Take 1 tablet (10 mg total) by mouth nightly as needed.    lisinopriL (PRINIVIL,ZESTRIL) 20 MG tablet Take 1 tablet (20 mg total) by mouth once daily.    pantoprazole (PROTONIX) 40 MG tablet Take 1 tablet (40 mg total) by mouth once daily.    pramipexole (MIRAPEX) 0.125 MG tablet Take 1 tablet (0.125 mg total) by mouth nightly as needed (rls).    pravastatin (PRAVACHOL) 10 MG tablet Take 1 tablet (10 mg total) by mouth once daily.    triamcinolone acetonide 0.1% (KENALOG) 0.1 % cream Apply topically 2 (two) times daily.     Family History     Problem Relation (Age of Onset)    Alcohol abuse Brother, Brother    Cancer Brother, Brother    Heart disease Mother, Father    No Known Problems Paternal Grandmother, Paternal Grandfather, Son    Parkinsonism Brother        Tobacco Use    Smoking status: Never Smoker    Smokeless tobacco: Never Used   Substance and Sexual Activity    Alcohol use: No    Drug use: No    Sexual activity: Never     Review of Systems   Unable to perform ROS: Mental status change     Objective:     Vital Signs (Most Recent):  Temp: 97.5 °F (36.4 °C) (10/24/20 0804)  Pulse: 87 (10/24/20 0804)  Resp: 20 (10/24/20 1403)  BP: (!) 66/34 (10/24/20 0804)  SpO2: (!) 92 % (10/24/20 0804) Vital Signs (24h Range):  Temp:  [97.5 °F (36.4 °C)-98.9 °F (37.2 °C)] 97.5 °F (36.4 °C)  Pulse:  [80-98] 87  Resp:  [16-20] 20  SpO2:  [92 %-99 %] 92 %  BP: (66-98)/(34-50) 66/34        There is no height or weight on file to calculate BMI.    Physical Exam  Vitals signs and nursing note  reviewed.   Constitutional:       Appearance: She is well-developed. She is ill-appearing.   HENT:      Head: Normocephalic and atraumatic.      Mouth/Throat:      Mouth: Mucous membranes are dry.   Neck:      Musculoskeletal: Normal range of motion and neck supple.   Cardiovascular:      Rate and Rhythm: Normal rate and regular rhythm.      Pulses: Normal pulses.      Heart sounds: Murmur (ASM) present.   Pulmonary:      Effort: Pulmonary effort is normal. No respiratory distress.      Breath sounds: Normal breath sounds.   Abdominal:      General: Bowel sounds are normal. There is no distension.      Palpations: Abdomen is soft.      Tenderness: There is no abdominal tenderness.   Genitourinary:     Comments: Deferred    Musculoskeletal:         General: Swelling (right knee) and tenderness (right knee) present.      Comments: Contractures to TERESITA hands and feet. RLE splint in place   Skin:     General: Skin is warm and dry.      Capillary Refill: Capillary refill takes 2 to 3 seconds.      Coloration: Skin is pale.      Findings: No rash.   Neurological:      Mental Status: She is unresponsive.      GCS: GCS eye subscore is 3. GCS verbal subscore is 4. GCS motor subscore is 6.      Motor: Weakness present.   Psychiatric:         Mood and Affect: Affect is flat.         Cognition and Memory: Cognition is impaired.             Significant Labs:   BMP:   Recent Labs   Lab 10/22/20  2323  10/23/20  1138   *   < > 88      < > 144   K 5.3*   < > 5.2*      < > 113*   CO2 21*   < > 18*   BUN 72*   < > 82*   CREATININE 3.1*   < > 3.0*   CALCIUM 8.6*   < > 9.3   MG 2.4  --   --     < > = values in this interval not displayed.     CBC:   Recent Labs   Lab 10/23/20  0635   WBC 9.48   HGB 9.7*   HCT 31.1*   *       Significant Imaging: I have reviewed all pertinent imaging results/findings within the past 24 hours.    Assessment/Plan:     Comfort measures only status   Plan of care reviewed with  family, bedside nurse .    Patient  Appears confused, currently in discomfort   Family elected to make patient comfort care given grave prognosis  Continue present POC for now and as soon as the family arrives will adjust as follows-    - Viramontes in place for comfort  - Central Line Catheter in place for IV medication administration   - Will consult CM regarding home hospice care.   - Morphine and Ativan PRN  Started on Morphine Infusion on   100 mg of Morphine in 100 mL of Dextrose 5%.   Final Conc is 1 mg/mL   Scopolamine for secretions      Stroke     Diagnosed with stroke during this admission  Was on stroke pathway  Neurology was following      VTE Risk Mitigation (From admission, onward)    None             Estrella Bennett MD  Department of Hospital Medicine   [facility]

## 2020-10-24 NOTE — SUBJECTIVE & OBJECTIVE
Past Medical History:   Diagnosis Date    Anemia     Cataract     Colitis     Disorder of kidney and ureter     Gastric ulcer     Hyperlipidemia     Hypertension     Malnutrition     Osteoporosis     PUD (peptic ulcer disease)     Restless leg syndrome     Rheumatoid arthritis     Seizure disorder, status epilepticus, nonconvulsive     STEMI (ST elevation myocardial infarction) 10/23/2020    Stroke 10/21/2020    TIA (transient ischemic attack) 1/23/12       Past Surgical History:   Procedure Laterality Date    ADENOIDECTOMY      FRACTURE SURGERY      left hip ORIF    HAND SURGERY      HYSTERECTOMY      complete    TONSILLECTOMY         Review of patient's allergies indicates:   Allergen Reactions    Antihistamines - alkylamine Other (See Comments)     Other reaction(s): Hallucinations    Hydrocodone Other (See Comments)     Confusion, isolated, abandon    Ibuprofen      Gi bleed, ckd    Plavix [clopidogrel] Hives    Prednisone Hallucinations     Other reaction(s): Hallucinations    Tramadol     Triamcinolone Other (See Comments)     hallucinations    Penicillins Rash     Other reaction(s): Unknown       Current Facility-Administered Medications on File Prior to Encounter   Medication    [COMPLETED] levETIRAcetam in NaCl (iso-os) IVPB 1,000 mg    [DISCONTINUED] 0.9%  NaCl infusion    [DISCONTINUED] aspirin EC tablet 162 mg    [DISCONTINUED] atorvastatin tablet 40 mg    [DISCONTINUED] cyanocobalamin tablet 100 mcg    [DISCONTINUED] dextrose 50% injection 25 g    [DISCONTINUED] dextrose 50% injection 25 g    [DISCONTINUED] docusate sodium capsule 100 mg    [DISCONTINUED] famotidine tablet 20 mg    [DISCONTINUED] fluticasone propionate 50 mcg/actuation nasal spray 50 mcg    [DISCONTINUED] hydrOXYzine HCL tablet 10 mg    [DISCONTINUED] labetaloL injection 10 mg    [DISCONTINUED] levETIRAcetam in NaCl (iso-os) IVPB 500 mg    [DISCONTINUED] lorazepam injection 1 mg     [DISCONTINUED] mupirocin 2 % ointment    [DISCONTINUED] ondansetron injection 4 mg    [DISCONTINUED] pramipexole tablet 0.125 mg    [DISCONTINUED] scopolamine 1.3-1.5 mg (1 mg over 3 days) 1 patch    [DISCONTINUED] sodium chloride 0.9% flush 10 mL     Current Outpatient Medications on File Prior to Encounter   Medication Sig    aspirin (ECOTRIN) 81 MG EC tablet Take 162 mg by mouth once daily. 2 at night    cyanocobalamin (VITAMIN B-12) 1000 MCG tablet Take 100 mcg by mouth once daily.    docusate sodium (COLACE) 100 MG capsule Take 100 mg by mouth 2 (two) times daily.    fluticasone propionate (FLONASE) 50 mcg/actuation nasal spray 1 spray (50 mcg total) by Each Nostril route once daily.    hydrOXYzine HCL (ATARAX) 10 MG Tab Take 1 tablet (10 mg total) by mouth nightly as needed.    lisinopriL (PRINIVIL,ZESTRIL) 20 MG tablet Take 1 tablet (20 mg total) by mouth once daily.    pantoprazole (PROTONIX) 40 MG tablet Take 1 tablet (40 mg total) by mouth once daily.    pramipexole (MIRAPEX) 0.125 MG tablet Take 1 tablet (0.125 mg total) by mouth nightly as needed (rls).    pravastatin (PRAVACHOL) 10 MG tablet Take 1 tablet (10 mg total) by mouth once daily.    triamcinolone acetonide 0.1% (KENALOG) 0.1 % cream Apply topically 2 (two) times daily.     Family History     Problem Relation (Age of Onset)    Alcohol abuse Brother, Brother    Cancer Brother, Brother    Heart disease Mother, Father    No Known Problems Paternal Grandmother, Paternal Grandfather, Son    Parkinsonism Brother        Tobacco Use    Smoking status: Never Smoker    Smokeless tobacco: Never Used   Substance and Sexual Activity    Alcohol use: No    Drug use: No    Sexual activity: Never     Review of Systems   Unable to perform ROS: Mental status change     Objective:     Vital Signs (Most Recent):  Temp: 97.5 °F (36.4 °C) (10/24/20 0804)  Pulse: 87 (10/24/20 0804)  Resp: 20 (10/24/20 1403)  BP: (!) 66/34 (10/24/20 0804)  SpO2: (!)  92 % (10/24/20 0804) Vital Signs (24h Range):  Temp:  [97.5 °F (36.4 °C)-98.9 °F (37.2 °C)] 97.5 °F (36.4 °C)  Pulse:  [80-98] 87  Resp:  [16-20] 20  SpO2:  [92 %-99 %] 92 %  BP: (66-98)/(34-50) 66/34        There is no height or weight on file to calculate BMI.    Physical Exam  Vitals signs and nursing note reviewed.   Constitutional:       Appearance: She is well-developed. She is ill-appearing.   HENT:      Head: Normocephalic and atraumatic.      Mouth/Throat:      Mouth: Mucous membranes are dry.   Neck:      Musculoskeletal: Normal range of motion and neck supple.   Cardiovascular:      Rate and Rhythm: Normal rate and regular rhythm.      Pulses: Normal pulses.      Heart sounds: Murmur (ASM) present.   Pulmonary:      Effort: Pulmonary effort is normal. No respiratory distress.      Breath sounds: Normal breath sounds.   Abdominal:      General: Bowel sounds are normal. There is no distension.      Palpations: Abdomen is soft.      Tenderness: There is no abdominal tenderness.   Genitourinary:     Comments: Deferred    Musculoskeletal:         General: Swelling (right knee) and tenderness (right knee) present.      Comments: Contractures to TERESITA hands and feet. RLE splint in place   Skin:     General: Skin is warm and dry.      Capillary Refill: Capillary refill takes 2 to 3 seconds.      Coloration: Skin is pale.      Findings: No rash.   Neurological:      Mental Status: She is unresponsive.      GCS: GCS eye subscore is 3. GCS verbal subscore is 4. GCS motor subscore is 6.      Motor: Weakness present.   Psychiatric:         Mood and Affect: Affect is flat.         Cognition and Memory: Cognition is impaired.             Significant Labs:   BMP:   Recent Labs   Lab 10/22/20  2323  10/23/20  1138   *   < > 88      < > 144   K 5.3*   < > 5.2*      < > 113*   CO2 21*   < > 18*   BUN 72*   < > 82*   CREATININE 3.1*   < > 3.0*   CALCIUM 8.6*   < > 9.3   MG 2.4  --   --     < > = values in  this interval not displayed.     CBC:   Recent Labs   Lab 10/23/20  0635   WBC 9.48   HGB 9.7*   HCT 31.1*   *       Significant Imaging: I have reviewed all pertinent imaging results/findings within the past 24 hours.

## 2020-10-24 NOTE — HOSPITAL COURSE
Patient was done transition to inpatient hospice.  Was started on morphine drip.  Patient remained comfortable on morphine drip.  No signs of distress.  On 10/24/2022 at 2:45 p.m. I was called to pronounce the patient. I personally saw and assessed the patient. No respirations were identified by visualization or auscultation. she had no carotid pulsations and no heart rate was identified by auscultation. Patient's pupils were fixed and dilated. she was pronounced dead by myself at 1445 pm on 10/24/2020

## 2020-10-25 NOTE — NURSING
Malfunction with MAR. Multiple doses wasted due to unable to scan barcode and match with order. Betty in pharmacy was made aware and charge nurse SINAN Sousa witnessed waste with other nurse. Issue resolved.

## 2020-10-26 ENCOUNTER — TELEPHONE (OUTPATIENT)
Dept: FAMILY MEDICINE | Facility: CLINIC | Age: 85
End: 2020-10-26

## 2020-10-26 NOTE — TELEPHONE ENCOUNTER
----- Message from Una Raymond sent at 10/26/2020 11:06 AM CDT -----  Regarding: picture request  Type: Needs Medical Advice  Who Called:  Caron Pearson    Best Call Back Number: [phone]  Additional Information:  Caron is calling in regards to her mother Melody pearson who passed away this past Saturday. Daughter wants to know if there is a way she can get the pt picture that's on her chart. Please call to advise.

## 2020-10-26 NOTE — TELEPHONE ENCOUNTER
Called patients daughter offered condolences of passing of patient, and informed her that she will also need to contact medical records for the picture of the patient.

## 2020-10-26 NOTE — LETTER
October 26, 2020    Family of Ms.Vivian BENNY Dupont  [address]  [place] [address]             [facility] [place] [facility]  [address]  [place] [address]  Phone: [phone]  Fax: [phone] To the family of Ms. Dupont:    Please accept our deepest condolences in regards to the recent death of your family member, Ms. Dupont. She was a most remarkable person, and it was always a pleasure to see her. I hope that we were able to provide her with some relief during the time that she was under our care.     On behalf of myself and my staff, please also extend our condolences to all of your family. If there is anything else that we can do for you, please do not hesitate to contact us.    Sincerely,        Larisa Wells MD

## 2023-01-30 NOTE — ANESTHESIA POSTPROCEDURE EVALUATION
"Anesthesia Post Evaluation    Patient: Melody Dupont    Procedure(s) Performed: Procedure(s) (LRB):  ESOPHAGOGASTRODUODENOSCOPY (EGD) (N/A)    Final Anesthesia Type: general  Patient location during evaluation: PACU  Patient participation: Yes- Able to Participate  Level of consciousness: awake and alert  Post-procedure vital signs: reviewed and stable  Pain management: adequate  Airway patency: patent  PONV status at discharge: No PONV  Anesthetic complications: no      Cardiovascular status: hemodynamically stable  Respiratory status: unassisted and room air  Hydration status: euvolemic  Follow-up not needed.        Visit Vitals  /63   Pulse 64   Temp 36.2 °C (97.2 °F) (Temporal)   Resp 16   Ht 5' 5.5" (1.664 m)   Wt 48.1 kg (106 lb)   SpO2 (!) 82%   Breastfeeding? No   BMI 17.37 kg/m²       Pain/Mike Score: Pain Assessment Performed: Yes (6/27/2017 11:34 AM)  Presence of Pain: denies (6/27/2017 11:34 AM)  Mike Score: 10 (6/27/2017 12:45 PM)      " Operative Report    Patient: Brit Thomas MRN: [id number]      YOB: 1972  Age: 48 y.o. Sex: female            Indications: Iron deficiency [email or web address]     Preoperative Evaluation: The patient was evaluated prior to the procedure in the GI lab admission area, the patient ASA was recorded . Consent was obtained from the patient with the risk of perforation bleeding and aspiration. Anesthesia: DARWIN-per anesthesia    Complications: None; patient tolerated the procedure well. EBL -insignificant      Procedure: The patient was sedated in the left lateral decubitus position. Scope was advanced from the rectum to the cecum. Evaluation was performed to the cecum twice. The scope was withdrawn to the rectum, retroflexed view was performed. The rectal exam was normal.  Preparation was good [place] score of 3/3/3:9 . Findings:   Exam to the cecum. 2 passes performed into the ascending colon. Normal cecum ascending mucosa. Normal transverse descending sigmoid and rectal mucosa retroflexion showed shallow hemorrhoid in continuation with external component through the dentate line      Postoperative Diagnosis: external hemorrhoids    02217 Colonoscopy, Flexible; diagnostic       Recommendations:   - Follow up with me.       Signed By:  Paulie Camp MD     January 30, 2023